# Patient Record
Sex: MALE | Race: WHITE | NOT HISPANIC OR LATINO | Employment: OTHER | URBAN - METROPOLITAN AREA
[De-identification: names, ages, dates, MRNs, and addresses within clinical notes are randomized per-mention and may not be internally consistent; named-entity substitution may affect disease eponyms.]

---

## 2018-03-10 ENCOUNTER — HOSPITAL ENCOUNTER (INPATIENT)
Facility: HOSPITAL | Age: 33
LOS: 5 days | Discharge: HOME | DRG: 439 | End: 2018-03-15
Attending: EMERGENCY MEDICINE | Admitting: FAMILY MEDICINE

## 2018-03-10 DIAGNOSIS — K85.20 ALCOHOL-INDUCED ACUTE PANCREATITIS, UNSPECIFIED COMPLICATION STATUS: Primary | ICD-10-CM

## 2018-03-10 DIAGNOSIS — R00.0 SINUS TACHYCARDIA: ICD-10-CM

## 2018-03-10 DIAGNOSIS — R00.0 TACHYCARDIA: ICD-10-CM

## 2018-03-10 PROBLEM — F10.20 ALCOHOL DEPENDENCE: Status: ACTIVE | Noted: 2018-03-10

## 2018-03-10 PROBLEM — Z72.0 TOBACCO ABUSE: Status: ACTIVE | Noted: 2018-03-10

## 2018-03-10 LAB
ALBUMIN SERPL BCP-MCNC: 4.1 G/DL
ALP SERPL-CCNC: 103 U/L
ALT SERPL W/O P-5'-P-CCNC: 137 U/L
AMPHET+METHAMPHET UR QL: NEGATIVE
AMYLASE SERPL-CCNC: 382 U/L
ANION GAP SERPL CALC-SCNC: 22 MMOL/L
AST SERPL-CCNC: 314 U/L
BACTERIA #/AREA URNS HPF: ABNORMAL /HPF
BACTERIA #/AREA URNS HPF: NORMAL /HPF
BARBITURATES UR QL SCN>200 NG/ML: NEGATIVE
BASOPHILS # BLD AUTO: 0.04 K/UL
BASOPHILS NFR BLD: 0.5 %
BENZODIAZ UR QL SCN>200 NG/ML: NEGATIVE
BILIRUB SERPL-MCNC: 1.2 MG/DL
BILIRUB UR QL STRIP: ABNORMAL
BILIRUB UR QL STRIP: NEGATIVE
BUN SERPL-MCNC: 4 MG/DL
BZE UR QL SCN: NEGATIVE
CALCIUM SERPL-MCNC: 9.1 MG/DL
CANNABINOIDS UR QL SCN: NEGATIVE
CAOX CRY URNS QL MICRO: NORMAL
CHLORIDE SERPL-SCNC: 101 MMOL/L
CLARITY UR: CLEAR
CLARITY UR: CLEAR
CO2 SERPL-SCNC: 18 MMOL/L
COLOR UR: ABNORMAL
COLOR UR: YELLOW
CREAT SERPL-MCNC: 0.7 MG/DL
CREAT UR-MCNC: 161.6 MG/DL
DIFFERENTIAL METHOD: ABNORMAL
EOSINOPHIL # BLD AUTO: 0 K/UL
EOSINOPHIL NFR BLD: 0.1 %
ERYTHROCYTE [DISTWIDTH] IN BLOOD BY AUTOMATED COUNT: 13.5 %
EST. GFR  (AFRICAN AMERICAN): >60 ML/MIN/1.73 M^2
EST. GFR  (NON AFRICAN AMERICAN): >60 ML/MIN/1.73 M^2
ETHANOL SERPL-MCNC: 218 MG/DL
GLUCOSE SERPL-MCNC: 98 MG/DL
GLUCOSE UR QL STRIP: NEGATIVE
GLUCOSE UR QL STRIP: NEGATIVE
HCT VFR BLD AUTO: 42.3 %
HGB BLD-MCNC: 14.5 G/DL
HGB UR QL STRIP: ABNORMAL
HGB UR QL STRIP: NEGATIVE
HYALINE CASTS #/AREA URNS LPF: 0 /LPF
KETONES UR QL STRIP: ABNORMAL
KETONES UR QL STRIP: ABNORMAL
LEUKOCYTE ESTERASE UR QL STRIP: NEGATIVE
LEUKOCYTE ESTERASE UR QL STRIP: NEGATIVE
LIPASE SERPL-CCNC: >1000 U/L
LYMPHOCYTES # BLD AUTO: 0.3 K/UL
LYMPHOCYTES NFR BLD: 4.2 %
MAGNESIUM SERPL-MCNC: 1.5 MG/DL
MCH RBC QN AUTO: 33 PG
MCHC RBC AUTO-ENTMCNC: 34.3 G/DL
MCV RBC AUTO: 96 FL
METHADONE UR QL SCN>300 NG/ML: NEGATIVE
MICROSCOPIC COMMENT: ABNORMAL
MICROSCOPIC COMMENT: NORMAL
MONOCYTES # BLD AUTO: 0.6 K/UL
MONOCYTES NFR BLD: 7.5 %
NEUTROPHILS # BLD AUTO: 6.6 K/UL
NEUTROPHILS NFR BLD: 87.4 %
NITRITE UR QL STRIP: NEGATIVE
NITRITE UR QL STRIP: NEGATIVE
OPIATES UR QL SCN: NORMAL
PCP UR QL SCN>25 NG/ML: NEGATIVE
PH UR STRIP: 5 [PH] (ref 5–8)
PH UR STRIP: 6 [PH] (ref 5–8)
PLATELET # BLD AUTO: 151 K/UL
PMV BLD AUTO: 10 FL
POTASSIUM SERPL-SCNC: 4 MMOL/L
PROT SERPL-MCNC: 7.4 G/DL
PROT UR QL STRIP: ABNORMAL
PROT UR QL STRIP: NEGATIVE
RBC # BLD AUTO: 4.39 M/UL
RBC #/AREA URNS HPF: 0 /HPF (ref 0–4)
RBC #/AREA URNS HPF: 7 /HPF (ref 0–4)
SODIUM SERPL-SCNC: 141 MMOL/L
SP GR UR STRIP: 1.01 (ref 1–1.03)
SP GR UR STRIP: 1.02 (ref 1–1.03)
SQUAMOUS #/AREA URNS HPF: 2 /HPF
SQUAMOUS #/AREA URNS HPF: 2 /HPF
TOXICOLOGY INFORMATION: NORMAL
TROPONIN I SERPL DL<=0.01 NG/ML-MCNC: <0.006 NG/ML
URN SPEC COLLECT METH UR: ABNORMAL
URN SPEC COLLECT METH UR: ABNORMAL
UROBILINOGEN UR STRIP-ACNC: NEGATIVE EU/DL
UROBILINOGEN UR STRIP-ACNC: NEGATIVE EU/DL
WBC # BLD AUTO: 7.56 K/UL
WBC #/AREA URNS HPF: 0 /HPF (ref 0–5)
WBC #/AREA URNS HPF: 0 /HPF (ref 0–5)

## 2018-03-10 PROCEDURE — 82150 ASSAY OF AMYLASE: CPT

## 2018-03-10 PROCEDURE — 85025 COMPLETE CBC W/AUTO DIFF WBC: CPT

## 2018-03-10 PROCEDURE — 25000003 PHARM REV CODE 250: Performed by: FAMILY MEDICINE

## 2018-03-10 PROCEDURE — 84443 ASSAY THYROID STIM HORMONE: CPT

## 2018-03-10 PROCEDURE — 99285 EMERGENCY DEPT VISIT HI MDM: CPT | Mod: 25

## 2018-03-10 PROCEDURE — 25000003 PHARM REV CODE 250: Performed by: EMERGENCY MEDICINE

## 2018-03-10 PROCEDURE — 80320 DRUG SCREEN QUANTALCOHOLS: CPT

## 2018-03-10 PROCEDURE — 25500020 PHARM REV CODE 255: Performed by: EMERGENCY MEDICINE

## 2018-03-10 PROCEDURE — 80053 COMPREHEN METABOLIC PANEL: CPT

## 2018-03-10 PROCEDURE — S0028 INJECTION, FAMOTIDINE, 20 MG: HCPCS | Performed by: EMERGENCY MEDICINE

## 2018-03-10 PROCEDURE — 63600175 PHARM REV CODE 636 W HCPCS: Performed by: FAMILY MEDICINE

## 2018-03-10 PROCEDURE — 36415 COLL VENOUS BLD VENIPUNCTURE: CPT

## 2018-03-10 PROCEDURE — 96361 HYDRATE IV INFUSION ADD-ON: CPT

## 2018-03-10 PROCEDURE — 80074 ACUTE HEPATITIS PANEL: CPT

## 2018-03-10 PROCEDURE — 80307 DRUG TEST PRSMV CHEM ANLYZR: CPT

## 2018-03-10 PROCEDURE — 84484 ASSAY OF TROPONIN QUANT: CPT

## 2018-03-10 PROCEDURE — 83690 ASSAY OF LIPASE: CPT

## 2018-03-10 PROCEDURE — 81000 URINALYSIS NONAUTO W/SCOPE: CPT | Mod: 91

## 2018-03-10 PROCEDURE — 83735 ASSAY OF MAGNESIUM: CPT

## 2018-03-10 PROCEDURE — 11000001 HC ACUTE MED/SURG PRIVATE ROOM

## 2018-03-10 PROCEDURE — 81000 URINALYSIS NONAUTO W/SCOPE: CPT

## 2018-03-10 PROCEDURE — 96374 THER/PROPH/DIAG INJ IV PUSH: CPT

## 2018-03-10 PROCEDURE — 94761 N-INVAS EAR/PLS OXIMETRY MLT: CPT

## 2018-03-10 PROCEDURE — 83036 HEMOGLOBIN GLYCOSYLATED A1C: CPT

## 2018-03-10 PROCEDURE — 86703 HIV-1/HIV-2 1 RESULT ANTBDY: CPT

## 2018-03-10 PROCEDURE — 96375 TX/PRO/DX INJ NEW DRUG ADDON: CPT

## 2018-03-10 PROCEDURE — 63600175 PHARM REV CODE 636 W HCPCS: Performed by: EMERGENCY MEDICINE

## 2018-03-10 RX ORDER — FAMOTIDINE 10 MG/ML
20 INJECTION INTRAVENOUS ONCE
Status: COMPLETED | OUTPATIENT
Start: 2018-03-10 | End: 2018-03-10

## 2018-03-10 RX ORDER — SODIUM CHLORIDE 0.9 % (FLUSH) 0.9 %
5 SYRINGE (ML) INJECTION
Status: DISCONTINUED | OUTPATIENT
Start: 2018-03-10 | End: 2018-03-15 | Stop reason: HOSPADM

## 2018-03-10 RX ORDER — IBUPROFEN 200 MG
1 TABLET ORAL DAILY
Status: DISCONTINUED | OUTPATIENT
Start: 2018-03-11 | End: 2018-03-15 | Stop reason: HOSPADM

## 2018-03-10 RX ORDER — FOLIC ACID 1 MG/1
1 TABLET ORAL DAILY
Status: DISCONTINUED | OUTPATIENT
Start: 2018-03-11 | End: 2018-03-15 | Stop reason: HOSPADM

## 2018-03-10 RX ORDER — MAGNESIUM SULFATE HEPTAHYDRATE 40 MG/ML
2 INJECTION, SOLUTION INTRAVENOUS ONCE
Status: COMPLETED | OUTPATIENT
Start: 2018-03-10 | End: 2018-03-10

## 2018-03-10 RX ORDER — ONDANSETRON 4 MG/1
4 TABLET, ORALLY DISINTEGRATING ORAL EVERY 6 HOURS PRN
Status: DISCONTINUED | OUTPATIENT
Start: 2018-03-10 | End: 2018-03-11

## 2018-03-10 RX ORDER — HYDROMORPHONE HYDROCHLORIDE 2 MG/ML
0.5 INJECTION, SOLUTION INTRAMUSCULAR; INTRAVENOUS; SUBCUTANEOUS
Status: COMPLETED | OUTPATIENT
Start: 2018-03-10 | End: 2018-03-10

## 2018-03-10 RX ORDER — HYDROMORPHONE HYDROCHLORIDE 2 MG/ML
1 INJECTION, SOLUTION INTRAMUSCULAR; INTRAVENOUS; SUBCUTANEOUS EVERY 6 HOURS PRN
Status: DISCONTINUED | OUTPATIENT
Start: 2018-03-10 | End: 2018-03-15 | Stop reason: HOSPADM

## 2018-03-10 RX ORDER — SODIUM CHLORIDE 9 MG/ML
INJECTION, SOLUTION INTRAVENOUS CONTINUOUS
Status: DISCONTINUED | OUTPATIENT
Start: 2018-03-10 | End: 2018-03-13

## 2018-03-10 RX ORDER — ONDANSETRON 2 MG/ML
4 INJECTION INTRAMUSCULAR; INTRAVENOUS
Status: COMPLETED | OUTPATIENT
Start: 2018-03-10 | End: 2018-03-10

## 2018-03-10 RX ORDER — THIAMINE HCL 100 MG
100 TABLET ORAL DAILY
Status: DISCONTINUED | OUTPATIENT
Start: 2018-03-11 | End: 2018-03-15 | Stop reason: HOSPADM

## 2018-03-10 RX ORDER — PANTOPRAZOLE SODIUM 40 MG/10ML
40 INJECTION, POWDER, LYOPHILIZED, FOR SOLUTION INTRAVENOUS DAILY
Status: DISCONTINUED | OUTPATIENT
Start: 2018-03-11 | End: 2018-03-13 | Stop reason: SDUPTHER

## 2018-03-10 RX ADMIN — HYDROMORPHONE HYDROCHLORIDE 0.5 MG: 2 INJECTION, SOLUTION INTRAMUSCULAR; INTRAVENOUS; SUBCUTANEOUS at 02:03

## 2018-03-10 RX ADMIN — IOHEXOL 75 ML: 350 INJECTION, SOLUTION INTRAVENOUS at 06:03

## 2018-03-10 RX ADMIN — FAMOTIDINE 20 MG: 10 INJECTION, SOLUTION INTRAVENOUS at 02:03

## 2018-03-10 RX ADMIN — HYDROMORPHONE HYDROCHLORIDE 1 MG: 2 INJECTION, SOLUTION INTRAMUSCULAR; INTRAVENOUS; SUBCUTANEOUS at 08:03

## 2018-03-10 RX ADMIN — FOLIC ACID: 5 INJECTION, SOLUTION INTRAMUSCULAR; INTRAVENOUS; SUBCUTANEOUS at 10:03

## 2018-03-10 RX ADMIN — ONDANSETRON 4 MG: 2 INJECTION INTRAMUSCULAR; INTRAVENOUS at 02:03

## 2018-03-10 RX ADMIN — MAGNESIUM SULFATE HEPTAHYDRATE 2 G: 40 INJECTION, SOLUTION INTRAVENOUS at 08:03

## 2018-03-10 RX ADMIN — LORAZEPAM 1 MG: 2 INJECTION INTRAMUSCULAR at 11:03

## 2018-03-10 RX ADMIN — SODIUM CHLORIDE 1000 ML: 0.9 INJECTION, SOLUTION INTRAVENOUS at 02:03

## 2018-03-10 NOTE — ED PROVIDER NOTES
Encounter Date: 3/10/2018       History     Chief Complaint   Patient presents with    Abdominal Pain     generalized abdominal pain since 7 this morning with nausea and vomiting       Abdominal Pain   The current episode started several hours ago. The onset of the illness was gradual. The problem has not changed since onset.The abdominal pain is located in the epigastric region. The abdominal pain does not radiate. The severity of the abdominal pain is 10/10. The other symptoms of the illness include vomiting. The other symptoms of the illness do not include fever, shortness of breath, diarrhea or dysuria.   The patient has not had a change in bowel habit. Symptoms associated with the illness do not include back pain.   Patient says he has had same symptoms many times in the past but no diagnosis.  Review of patient's allergies indicates:  No Known Allergies  History reviewed. No pertinent past medical history.  Past Surgical History:   Procedure Laterality Date    APPENDECTOMY       History reviewed. No pertinent family history.  Social History   Substance Use Topics    Smoking status: Current Every Day Smoker    Smokeless tobacco: Not on file    Alcohol use Not on file     Review of Systems   Constitutional: Negative for fever.   Respiratory: Negative for shortness of breath.    Cardiovascular: Negative for chest pain.   Gastrointestinal: Positive for abdominal pain and vomiting. Negative for diarrhea.   Genitourinary: Negative for dysuria.   Musculoskeletal: Negative for back pain.   Skin: Negative for color change.   All other systems reviewed and are negative.      Physical Exam     Initial Vitals [03/10/18 1346]   BP Pulse Resp Temp SpO2   115/70 (!) 112 16 97.7 °F (36.5 °C) --      MAP       85         Physical Exam    Nursing note and vitals reviewed.  Constitutional: He appears distressed.   HENT:   Head: Normocephalic and atraumatic.   Eyes: EOM are normal.   Neck: Normal range of motion. Neck  supple.   Cardiovascular: Normal rate, regular rhythm and normal heart sounds.   Pulmonary/Chest: Breath sounds normal.   Abdominal:   Soft.  Midepigastric tenderness with voluntary guarding.  No rebound.  Bowel sounds normal.   Musculoskeletal: Normal range of motion. He exhibits no edema.   Neurological: He is alert and oriented to person, place, and time.   Skin: Skin is warm and dry.   Psychiatric: His behavior is normal. Thought content normal.         ED Course   Procedures  Labs Reviewed   URINALYSIS - Abnormal; Notable for the following:        Result Value    Color, UA Orange (*)     Protein, UA 1+ (*)     Ketones, UA 1+ (*)     Bilirubin (UA) 1+ (*)     All other components within normal limits   URINALYSIS MICROSCOPIC   TOXICOLOGY SCREEN, URINE, RANDOM (COMPLIANCE)   CBC W/ AUTO DIFFERENTIAL   COMPREHENSIVE METABOLIC PANEL   TROPONIN I   AMYLASE   LIPASE   MAGNESIUM   ALCOHOL,MEDICAL (ETHANOL)             Medical Decision Making:   Clinical Tests:   Lab Tests: Ordered and Reviewed  ED Management:  32-year-old male with severe upper abdominal pain and vomiting due to pancreatitis.  Patient will be admitted to the [facility] family practice team.                      Clinical Impression:   The encounter diagnosis was Alcohol-induced acute pancreatitis, unspecified complication status.                           Can Solis MD  03/10/18 6863

## 2018-03-10 NOTE — HPI
Patient is a 32 year old male with no medical history presenting to the ED after awakening with severe abdominal pain and nausea followed by multiple episodes of emesis.  Patient reports drinking 1 pint of Juan Leyva yesterday.  States he woke up with nausea and pain this morning and when he drank a few sips of water he reports it was followed by multiple episodes of emesis.  He decided to present to the ED as the pain did not subside on its own.  Reports 9/10 abdominal pain with minimal radiation to the back.  Denies trying anything at home for the pain, no aggravating/alleviating factors. He states he is a daily 1/2 to 1 pint Juan Leyva drinker on a daily basis.  Has experienced the shakes and tremors in the past and states he had continued to drink to help alleviate the symptoms.  Has never been hospitalized for this problem.  He denies any illicit drug abuse.  Current 10 cigarettes per day smoker for the past 20 years.  At time of evaluation pain 6/10 (after receiving dose of pain medications).

## 2018-03-10 NOTE — SUBJECTIVE & OBJECTIVE
History reviewed. No pertinent past medical history.    Past Surgical History:   Procedure Laterality Date    APPENDECTOMY         Review of patient's allergies indicates:  No Known Allergies    No current facility-administered medications on file prior to encounter.      No current outpatient prescriptions on file prior to encounter.     Family History     None        Social History Main Topics    Smoking status: Current Every Day Smoker    Smokeless tobacco: Not on file    Alcohol use Not on file    Drug use: Unknown    Sexual activity: Not on file     Review of Systems   Constitutional: Negative for chills and fever.   HENT: Negative for sore throat.    Eyes: Negative for visual disturbance.   Respiratory: Negative for cough, shortness of breath and wheezing.    Cardiovascular: Negative for chest pain, palpitations and leg swelling.   Gastrointestinal: Positive for abdominal pain, nausea and vomiting. Negative for constipation and diarrhea.   Genitourinary: Negative for difficulty urinating.   Musculoskeletal: Negative for arthralgias and myalgias.   Neurological: Negative for dizziness and seizures.   Psychiatric/Behavioral: The patient is not nervous/anxious.      Objective:     Vital Signs (Most Recent):  Temp: 97.7 °F (36.5 °C) (03/10/18 1346)  Pulse: (!) 112 (03/10/18 1346)  Resp: 16 (03/10/18 1346)  BP: 115/70 (03/10/18 1346) Vital Signs (24h Range):  Temp:  [97.7 °F (36.5 °C)] 97.7 °F (36.5 °C)  Pulse:  [112] 112  Resp:  [16] 16  BP: (115)/(70) 115/70     Weight: 58.1 kg (128 lb)  Body mass index is 21.97 kg/m².    Physical Exam   Constitutional: He is oriented to person, place, and time. He appears well-developed and well-nourished.   HENT:   Head: Normocephalic and atraumatic.   Eyes: Conjunctivae are normal.   Neck: Normal range of motion. No JVD present.   Cardiovascular: Normal rate and regular rhythm.    Pulmonary/Chest: Effort normal and breath sounds normal. No respiratory distress. He has no  wheezes. He has no rales.   Abdominal: Soft. Bowel sounds are normal. He exhibits no distension. There is tenderness (epigastric tenderness to palpation).   Musculoskeletal: Normal range of motion.   Neurological: He is alert and oriented to person, place, and time. No cranial nerve deficit.   Skin: Skin is warm. No rash noted.   Psychiatric: He has a normal mood and affect. His behavior is normal. Judgment and thought content normal.           Significant Labs: All labs reviewed. (see paper chart, and pertinent labs below)  AST/ALT: 314/137  Etoh level: 218.1  Lipase: >1000  Amylase: 382  M.5  Trop: WNL  CBC: 7.56/14.5/42.3/151    Significant Imaging: CXR and CT abdomen: pending

## 2018-03-10 NOTE — ED NOTES
32 year old male presents to ed cc of emesis and abdominal pain x 1 day. Patient states he ate at a chinese buffet last night and this morning 0700 began with emesis and ruq/luq abdominal pain.

## 2018-03-10 NOTE — ED NOTES
APPEARANCE: Alert, oriented and in no acute distress.  CARDIAC: Normal rate and rhythm, no murmur heard.   PERIPHERAL VASCULAR: peripheral pulses present. Normal cap refill. No edema. Warm to touch.    RESPIRATORY:Normal rate and effort, breath sounds clear bilaterally throughout chest. Respirations are equal and unlabored no obvious signs of distress.  MUSC: Full ROM. No bony tenderness or soft tissue tenderness. No obvious deformity.  SKIN: Skin is warm and dry, normal skin turgor, mucous membranes moist.  NEURO: 5/5 strength major flexors/extensors bilaterally. Sensory intact to light touch bilaterally. [place] coma scale: eyes open spontaneously-4, oriented & converses-5, obeys commands-6. No neurological abnormalities.   MENTAL STATUS: awake, alert and aware of environment.  EYE: PERRL, both eyes: pupils brisk and reactive to light. Normal size.  ENT: EARS: no obvious drainage. NOSE: no active bleeding.

## 2018-03-11 LAB
ALBUMIN SERPL BCP-MCNC: 3.7 G/DL
ALP SERPL-CCNC: 86 U/L
ALT SERPL W/O P-5'-P-CCNC: 102 U/L
ANION GAP SERPL CALC-SCNC: 10 MMOL/L
AST SERPL-CCNC: 218 U/L
BASOPHILS # BLD AUTO: 0.03 K/UL
BASOPHILS NFR BLD: 0.4 %
BILIRUB SERPL-MCNC: 1.6 MG/DL
BUN SERPL-MCNC: 4 MG/DL
CALCIUM SERPL-MCNC: 8.4 MG/DL
CHLORIDE SERPL-SCNC: 100 MMOL/L
CO2 SERPL-SCNC: 29 MMOL/L
CREAT SERPL-MCNC: 0.7 MG/DL
DIFFERENTIAL METHOD: ABNORMAL
EOSINOPHIL # BLD AUTO: 0 K/UL
EOSINOPHIL NFR BLD: 0.1 %
ERYTHROCYTE [DISTWIDTH] IN BLOOD BY AUTOMATED COUNT: 13.7 %
EST. GFR  (AFRICAN AMERICAN): >60 ML/MIN/1.73 M^2
EST. GFR  (NON AFRICAN AMERICAN): >60 ML/MIN/1.73 M^2
ESTIMATED AVG GLUCOSE: 82 MG/DL
GLUCOSE SERPL-MCNC: 73 MG/DL
HBA1C MFR BLD HPLC: 4.5 %
HCT VFR BLD AUTO: 38 %
HGB BLD-MCNC: 12.8 G/DL
INR PPP: 1.1
LYMPHOCYTES # BLD AUTO: 0.7 K/UL
LYMPHOCYTES NFR BLD: 8.9 %
MAGNESIUM SERPL-MCNC: 1.9 MG/DL
MCH RBC QN AUTO: 32.6 PG
MCHC RBC AUTO-ENTMCNC: 33.7 G/DL
MCV RBC AUTO: 97 FL
MONOCYTES # BLD AUTO: 1 K/UL
MONOCYTES NFR BLD: 12.8 %
NEUTROPHILS # BLD AUTO: 5.9 K/UL
NEUTROPHILS NFR BLD: 77.5 %
PHOSPHATE SERPL-MCNC: 3.5 MG/DL
PLATELET # BLD AUTO: 111 K/UL
PMV BLD AUTO: 10.1 FL
POTASSIUM SERPL-SCNC: 4.2 MMOL/L
PROT SERPL-MCNC: 6.5 G/DL
PROTHROMBIN TIME: 11.7 SEC
RBC # BLD AUTO: 3.93 M/UL
SODIUM SERPL-SCNC: 139 MMOL/L
TSH SERPL DL<=0.005 MIU/L-ACNC: 0.77 UIU/ML
WBC # BLD AUTO: 7.55 K/UL

## 2018-03-11 PROCEDURE — S4991 NICOTINE PATCH NONLEGEND: HCPCS | Performed by: FAMILY MEDICINE

## 2018-03-11 PROCEDURE — 80053 COMPREHEN METABOLIC PANEL: CPT

## 2018-03-11 PROCEDURE — 11000001 HC ACUTE MED/SURG PRIVATE ROOM

## 2018-03-11 PROCEDURE — 84100 ASSAY OF PHOSPHORUS: CPT

## 2018-03-11 PROCEDURE — 25000003 PHARM REV CODE 250: Performed by: FAMILY MEDICINE

## 2018-03-11 PROCEDURE — 63600175 PHARM REV CODE 636 W HCPCS: Performed by: STUDENT IN AN ORGANIZED HEALTH CARE EDUCATION/TRAINING PROGRAM

## 2018-03-11 PROCEDURE — C9113 INJ PANTOPRAZOLE SODIUM, VIA: HCPCS | Performed by: FAMILY MEDICINE

## 2018-03-11 PROCEDURE — 63600175 PHARM REV CODE 636 W HCPCS: Performed by: FAMILY MEDICINE

## 2018-03-11 PROCEDURE — 36415 COLL VENOUS BLD VENIPUNCTURE: CPT

## 2018-03-11 PROCEDURE — 85025 COMPLETE CBC W/AUTO DIFF WBC: CPT

## 2018-03-11 PROCEDURE — 83735 ASSAY OF MAGNESIUM: CPT

## 2018-03-11 PROCEDURE — 85610 PROTHROMBIN TIME: CPT

## 2018-03-11 PROCEDURE — 94761 N-INVAS EAR/PLS OXIMETRY MLT: CPT

## 2018-03-11 PROCEDURE — 25000003 PHARM REV CODE 250: Performed by: STUDENT IN AN ORGANIZED HEALTH CARE EDUCATION/TRAINING PROGRAM

## 2018-03-11 RX ORDER — ONDANSETRON 4 MG/1
4 TABLET, ORALLY DISINTEGRATING ORAL EVERY 6 HOURS
Status: DISCONTINUED | OUTPATIENT
Start: 2018-03-11 | End: 2018-03-15 | Stop reason: HOSPADM

## 2018-03-11 RX ORDER — ONDANSETRON 8 MG/1
8 TABLET, ORALLY DISINTEGRATING ORAL ONCE
Status: COMPLETED | OUTPATIENT
Start: 2018-03-11 | End: 2018-03-11

## 2018-03-11 RX ORDER — CHLORDIAZEPOXIDE HYDROCHLORIDE 25 MG/1
25 CAPSULE, GELATIN COATED ORAL EVERY 8 HOURS
Status: DISCONTINUED | OUTPATIENT
Start: 2018-03-11 | End: 2018-03-11

## 2018-03-11 RX ADMIN — PANTOPRAZOLE SODIUM 40 MG: 40 INJECTION, POWDER, FOR SOLUTION INTRAVENOUS at 09:03

## 2018-03-11 RX ADMIN — ONDANSETRON 4 MG: 4 TABLET, ORALLY DISINTEGRATING ORAL at 06:03

## 2018-03-11 RX ADMIN — LORAZEPAM 1 MG: 2 INJECTION INTRAMUSCULAR; INTRAVENOUS at 10:03

## 2018-03-11 RX ADMIN — HYDROMORPHONE HYDROCHLORIDE 1 MG: 2 INJECTION, SOLUTION INTRAMUSCULAR; INTRAVENOUS; SUBCUTANEOUS at 07:03

## 2018-03-11 RX ADMIN — FOLIC ACID 1 MG: 1 TABLET ORAL at 09:03

## 2018-03-11 RX ADMIN — Medication 100 MG: at 09:03

## 2018-03-11 RX ADMIN — HYDROMORPHONE HYDROCHLORIDE 1 MG: 2 INJECTION, SOLUTION INTRAMUSCULAR; INTRAVENOUS; SUBCUTANEOUS at 02:03

## 2018-03-11 RX ADMIN — SODIUM CHLORIDE: 0.9 INJECTION, SOLUTION INTRAVENOUS at 05:03

## 2018-03-11 RX ADMIN — ONDANSETRON 4 MG: 4 TABLET, ORALLY DISINTEGRATING ORAL at 11:03

## 2018-03-11 RX ADMIN — ONDANSETRON 8 MG: 8 TABLET, ORALLY DISINTEGRATING ORAL at 04:03

## 2018-03-11 RX ADMIN — PROMETHAZINE HYDROCHLORIDE 12.5 MG: 25 INJECTION INTRAMUSCULAR; INTRAVENOUS at 04:03

## 2018-03-11 RX ADMIN — LORAZEPAM 1 MG: 2 INJECTION INTRAMUSCULAR; INTRAVENOUS at 03:03

## 2018-03-11 RX ADMIN — NICOTINE 1 PATCH: 14 PATCH, EXTENDED RELEASE TRANSDERMAL at 09:03

## 2018-03-11 NOTE — PLAN OF CARE
Subjective:      Principal Problem:Alcohol-induced acute pancreatitis     Chief Complaint:        Chief Complaint   Patient presents with    Abdominal Pain       generalized abdominal pain since 7 this morning with nausea and vomiting      Pt independent with ADLs, no HH/HME. Lives with wife and dependent children, Rosi Liz. Family can provide transportation on discharge.       03/11/18 1823   Discharge Assessment   Assessment Type Discharge Planning Assessment   Confirmed/corrected address and phone number on facesheet? Yes   Assessment information obtained from? Patient  (pt and numerous family members in room)   Expected Length of Stay (days) 3   Communicated expected length of stay with patient/caregiver yes   Prior to hospitilization cognitive status: Alert/Oriented   Prior to hospitalization functional status: Independent   Current cognitive status: Alert/Oriented   Current Functional Status: Independent   Facility Arrived From: (home)   Lives With spouse   Able to Return to Prior Arrangements yes   Is patient able to care for self after discharge? Yes   Who are your caregiver(s) and their phone number(s)? wife: Rosi Liz [phone]   Patient's perception of discharge disposition home or selfcare   Readmission Within The Last 30 Days no previous admission in last 30 days   Patient currently being followed by outpatient case management? No   Patient currently receives any other outside agency services? No   Equipment Currently Used at Home none   Do you have any problems affording any of your prescribed medications? TBD   Is the patient taking medications as prescribed? yes   Does the patient have transportation home? Yes   Dialysis Name and Scheduled days N/A   Does the patient receive services at the [facility]? No   Discharge Plan A Home   Discharge Plan B Home with family   Patient/Family In Agreement With Plan yes     Katharina Aguillon RN Transitional Navigator  [phone]

## 2018-03-11 NOTE — NURSING
Notified Dr. Mayes that the pt was having multiple episode of vomiting and stated that he feels very weak. Phenergan and Zofran will be prescribed.

## 2018-03-11 NOTE — H&P
[facility] Hospital Medicine  History & Physical    Patient Name: Humberto Liz  MRN: [id number]  Admission Date: 3/10/2018  Attending Physician: Dr. Rigoberto Ricks  Primary Care Provider: No primary care provider on file.         Patient information was obtained from patient and ER records.     Subjective:     Principal Problem:Alcohol-induced acute pancreatitis    Chief Complaint:   Chief Complaint   Patient presents with    Abdominal Pain     generalized abdominal pain since 7 this morning with nausea and vomiting        HPI: Patient is a 32 year old male with no medical history presenting to the ED after awakening with severe abdominal pain and nausea followed by multiple episodes of emesis.  Patient reports drinking 1 pint of Juan Leyva yesterday.  States he woke up with nausea and pain this morning and when he drank a few sips of water he reports it was followed by multiple episodes of emesis.  He decided to present to the ED as the pain did not subside on its own.  Reports 9/10 abdominal pain with minimal radiation to the back.  Denies trying anything at home for the pain, no aggravating/alleviating factors. He states he is a daily 1/2 to 1 pint Juan Leyva drinker on a daily basis.  Has experienced the shakes and tremors in the past and states he had continued to drink to help alleviate the symptoms.  Has never been hospitalized for this problem.  He denies any illicit drug abuse.  Current 10 cigarettes per day smoker for the past 20 years.  At time of evaluation pain 6/10 (after receiving dose of pain medications).    History reviewed. No pertinent past medical history.    Past Surgical History:   Procedure Laterality Date    APPENDECTOMY         Review of patient's allergies indicates:  No Known Allergies    No current facility-administered medications on file prior to encounter.      No current outpatient prescriptions on file prior to encounter.     Family History     None         Social History Main Topics    Smoking status: Current Every Day Smoker    Smokeless tobacco: Not on file    Alcohol use Not on file    Drug use: Unknown    Sexual activity: Not on file     Review of Systems   Constitutional: Negative for chills and fever.   HENT: Negative for sore throat.    Eyes: Negative for visual disturbance.   Respiratory: Negative for cough, shortness of breath and wheezing.    Cardiovascular: Negative for chest pain, palpitations and leg swelling.   Gastrointestinal: Positive for abdominal pain, nausea and vomiting. Negative for constipation and diarrhea.   Genitourinary: Negative for difficulty urinating.   Musculoskeletal: Negative for arthralgias and myalgias.   Neurological: Negative for dizziness and seizures.   Psychiatric/Behavioral: The patient is not nervous/anxious.      Objective:     Vital Signs (Most Recent):  Temp: 97.7 °F (36.5 °C) (03/10/18 1346)  Pulse: (!) 112 (03/10/18 1346)  Resp: 16 (03/10/18 1346)  BP: 115/70 (03/10/18 1346) Vital Signs (24h Range):  Temp:  [97.7 °F (36.5 °C)] 97.7 °F (36.5 °C)  Pulse:  [112] 112  Resp:  [16] 16  BP: (115)/(70) 115/70     Weight: 58.1 kg (128 lb)  Body mass index is 21.97 kg/m².    Physical Exam   Constitutional: He is oriented to person, place, and time. He appears well-developed and well-nourished.   HENT:   Head: Normocephalic and atraumatic.   Eyes: Conjunctivae are normal.   Neck: Normal range of motion. No JVD present.   Cardiovascular: Normal rate and regular rhythm.    Pulmonary/Chest: Effort normal and breath sounds normal. No respiratory distress. He has no wheezes. He has no rales.   Abdominal: Soft. Bowel sounds are normal. He exhibits no distension. There is tenderness (epigastric tenderness to palpation).   Musculoskeletal: Normal range of motion.   Neurological: He is alert and oriented to person, place, and time. No cranial nerve deficit.   Skin: Skin is warm. No rash noted.   Psychiatric: He has a normal mood  and affect. His behavior is normal. Judgment and thought content normal.           Significant Labs: All labs reviewed. (see paper chart, and pertinent labs below)  AST/ALT: 314/137  Etoh level: 218.1  Lipase: >1000  Amylase: 382  M.5  Trop: WNL  CBC: 7.56/14.5/42.3/151    Significant Imaging: CXR and CT abdomen: pending    Assessment/Plan:     * Alcohol-induced acute pancreatitis    Patient with longstanding history of alcohol abuse  Reported severe abdominal pain associated with nausea/vomiting  Lipase >1000  Amylase: 382  NPO, s/p 1L IVF bolus in ED, will administer banana bag now then continuous IVFs, adequate pain control  CT of abdomen pending        Alcohol dependence    1/2-1 pint of Juan Leyva daily drinker x3 or more years  +history of shakes/tremors in the past resolved with drinking  Etoh level: 218.1  Banana Bag  Thiamine and folate supplementation  Ativan PRN for seizures or tremors, likely will start Librium taper in AM.  CIWA score 0 on admit  Neuro checks, fall precautions/seizure precautions/aspiration precautions.  Patient expresses interest to quit drinking.          Tobacco abuse    10 cigarettes/day x20 years  Counseled on the risks of smoking  Nicotine patch              VTE Risk Mitigation     None             Sandoval Bryant MD  Department of Hospital Medicine   [facility]

## 2018-03-11 NOTE — ASSESSMENT & PLAN NOTE
1/2-1 pint of Juan Leyva daily drinker x3 or more years  +history of shakes/tremors in the past resolved with drinking  Etoh level: 218.1  Banana Bag  Thiamine and folate supplementation  Ativan PRN for seizures or tremors, likely will start Librium taper in AM.  CIWA score 0 on admit  Neuro checks, fall precautions/seizure precautions/aspiration precautions.  Patient expresses interest to quit drinking.

## 2018-03-11 NOTE — NURSING
Notified Dr. Mayes that the pt did not tolerate his clear liquid diet well. Pt complained of nausea and vomited.

## 2018-03-11 NOTE — ASSESSMENT & PLAN NOTE
Patient with longstanding history of alcohol abuse  Reported severe abdominal pain associated with nausea/vomiting  Lipase >1000  Amylase: 382  NPO, s/p 1L IVF bolus in ED, will administer banana bag now then continuous IVFs, adequate pain control  CT of abdomen pending

## 2018-03-11 NOTE — PLAN OF CARE
Problem: Patient Care Overview  Goal: Plan of Care Review  Outcome: Ongoing (interventions implemented as appropriate)  Patient accepting of interventions

## 2018-03-12 PROBLEM — F10.939 ALCOHOL WITHDRAWAL: Status: ACTIVE | Noted: 2018-03-12

## 2018-03-12 LAB
ALBUMIN SERPL BCP-MCNC: 3.4 G/DL
ALP SERPL-CCNC: 80 U/L
ALT SERPL W/O P-5'-P-CCNC: 80 U/L
AMMONIA PLAS-SCNC: 24 UMOL/L
ANION GAP SERPL CALC-SCNC: 11 MMOL/L
AST SERPL-CCNC: 161 U/L
BASOPHILS # BLD AUTO: 0.03 K/UL
BASOPHILS NFR BLD: 0.4 %
BILIRUB SERPL-MCNC: 2.1 MG/DL
BUN SERPL-MCNC: 3 MG/DL
CALCIUM SERPL-MCNC: 8.5 MG/DL
CHLORIDE SERPL-SCNC: 99 MMOL/L
CO2 SERPL-SCNC: 27 MMOL/L
CREAT SERPL-MCNC: 0.7 MG/DL
DIFFERENTIAL METHOD: ABNORMAL
EOSINOPHIL # BLD AUTO: 0.5 K/UL
EOSINOPHIL NFR BLD: 6.9 %
ERYTHROCYTE [DISTWIDTH] IN BLOOD BY AUTOMATED COUNT: 13.4 %
EST. GFR  (AFRICAN AMERICAN): >60 ML/MIN/1.73 M^2
EST. GFR  (NON AFRICAN AMERICAN): >60 ML/MIN/1.73 M^2
FOLATE SERPL-MCNC: 5 NG/ML
GLUCOSE SERPL-MCNC: 92 MG/DL
HAV IGM SERPL QL IA: NEGATIVE
HBV CORE IGM SERPL QL IA: NEGATIVE
HBV SURFACE AG SERPL QL IA: NEGATIVE
HCT VFR BLD AUTO: 37.4 %
HCV AB SERPL QL IA: NEGATIVE
HGB BLD-MCNC: 12.4 G/DL
HIV 1+2 AB+HIV1 P24 AG SERPL QL IA: NEGATIVE
LYMPHOCYTES # BLD AUTO: 0.8 K/UL
LYMPHOCYTES NFR BLD: 10.3 %
MAGNESIUM SERPL-MCNC: 1.6 MG/DL
MCH RBC QN AUTO: 32 PG
MCHC RBC AUTO-ENTMCNC: 33.2 G/DL
MCV RBC AUTO: 97 FL
MONOCYTES # BLD AUTO: 0.9 K/UL
MONOCYTES NFR BLD: 12.3 %
NEUTROPHILS # BLD AUTO: 5.1 K/UL
NEUTROPHILS NFR BLD: 70 %
PHOSPHATE SERPL-MCNC: 1.2 MG/DL
PLATELET # BLD AUTO: 109 K/UL
PMV BLD AUTO: 10.9 FL
POCT GLUCOSE: 75 MG/DL (ref 70–110)
POTASSIUM SERPL-SCNC: 4.1 MMOL/L
PROT SERPL-MCNC: 6.1 G/DL
RBC # BLD AUTO: 3.87 M/UL
RPR SER QL: NORMAL
SODIUM SERPL-SCNC: 137 MMOL/L
VIT B12 SERPL-MCNC: 936 PG/ML
WBC # BLD AUTO: 7.25 K/UL

## 2018-03-12 PROCEDURE — 83735 ASSAY OF MAGNESIUM: CPT

## 2018-03-12 PROCEDURE — 93005 ELECTROCARDIOGRAM TRACING: CPT

## 2018-03-12 PROCEDURE — 63600175 PHARM REV CODE 636 W HCPCS: Performed by: STUDENT IN AN ORGANIZED HEALTH CARE EDUCATION/TRAINING PROGRAM

## 2018-03-12 PROCEDURE — 25000003 PHARM REV CODE 250: Performed by: FAMILY MEDICINE

## 2018-03-12 PROCEDURE — 86592 SYPHILIS TEST NON-TREP QUAL: CPT

## 2018-03-12 PROCEDURE — 82607 VITAMIN B-12: CPT

## 2018-03-12 PROCEDURE — 94761 N-INVAS EAR/PLS OXIMETRY MLT: CPT

## 2018-03-12 PROCEDURE — 25000003 PHARM REV CODE 250

## 2018-03-12 PROCEDURE — 25000003 PHARM REV CODE 250: Performed by: STUDENT IN AN ORGANIZED HEALTH CARE EDUCATION/TRAINING PROGRAM

## 2018-03-12 PROCEDURE — 85025 COMPLETE CBC W/AUTO DIFF WBC: CPT

## 2018-03-12 PROCEDURE — 63600175 PHARM REV CODE 636 W HCPCS: Performed by: FAMILY MEDICINE

## 2018-03-12 PROCEDURE — 20000000 HC ICU ROOM

## 2018-03-12 PROCEDURE — 36415 COLL VENOUS BLD VENIPUNCTURE: CPT

## 2018-03-12 PROCEDURE — 82140 ASSAY OF AMMONIA: CPT

## 2018-03-12 PROCEDURE — 93010 ELECTROCARDIOGRAM REPORT: CPT | Mod: 76,,, | Performed by: INTERNAL MEDICINE

## 2018-03-12 PROCEDURE — 84100 ASSAY OF PHOSPHORUS: CPT

## 2018-03-12 PROCEDURE — 82746 ASSAY OF FOLIC ACID SERUM: CPT

## 2018-03-12 PROCEDURE — S4991 NICOTINE PATCH NONLEGEND: HCPCS | Performed by: FAMILY MEDICINE

## 2018-03-12 PROCEDURE — 80053 COMPREHEN METABOLIC PANEL: CPT

## 2018-03-12 PROCEDURE — C9113 INJ PANTOPRAZOLE SODIUM, VIA: HCPCS | Performed by: FAMILY MEDICINE

## 2018-03-12 PROCEDURE — 93010 ELECTROCARDIOGRAM REPORT: CPT | Mod: ,,, | Performed by: INTERNAL MEDICINE

## 2018-03-12 PROCEDURE — 63600175 PHARM REV CODE 636 W HCPCS

## 2018-03-12 RX ORDER — DIAZEPAM 10 MG/2ML
10 INJECTION INTRAMUSCULAR ONCE
Status: DISCONTINUED | OUTPATIENT
Start: 2018-03-12 | End: 2018-03-12

## 2018-03-12 RX ORDER — DIAZEPAM 10 MG/2ML
5 INJECTION INTRAMUSCULAR EVERY 4 HOURS PRN
Status: DISCONTINUED | OUTPATIENT
Start: 2018-03-12 | End: 2018-03-12

## 2018-03-12 RX ORDER — DIAZEPAM 10 MG/2ML
5 INJECTION INTRAMUSCULAR ONCE
Status: DISCONTINUED | OUTPATIENT
Start: 2018-03-12 | End: 2018-03-12

## 2018-03-12 RX ORDER — SODIUM CHLORIDE 9 MG/ML
INJECTION, SOLUTION INTRAVENOUS CONTINUOUS
Status: DISCONTINUED | OUTPATIENT
Start: 2018-03-12 | End: 2018-03-13

## 2018-03-12 RX ORDER — DEXMEDETOMIDINE HYDROCHLORIDE 4 UG/ML
INJECTION, SOLUTION INTRAVENOUS
Status: DISPENSED
Start: 2018-03-12 | End: 2018-03-13

## 2018-03-12 RX ORDER — DEXMEDETOMIDINE HYDROCHLORIDE 4 UG/ML
0.2 INJECTION, SOLUTION INTRAVENOUS CONTINUOUS
Status: DISCONTINUED | OUTPATIENT
Start: 2018-03-12 | End: 2018-03-14

## 2018-03-12 RX ORDER — FLUMAZENIL 0.1 MG/ML
INJECTION INTRAVENOUS
Status: DISCONTINUED
Start: 2018-03-12 | End: 2018-03-12 | Stop reason: WASHOUT

## 2018-03-12 RX ADMIN — SODIUM CHLORIDE: 0.9 INJECTION, SOLUTION INTRAVENOUS at 01:03

## 2018-03-12 RX ADMIN — DEXMEDETOMIDINE HYDROCHLORIDE 1.4 MCG/KG/HR: 4 INJECTION, SOLUTION INTRAVENOUS at 06:03

## 2018-03-12 RX ADMIN — Medication 100 MG: at 08:03

## 2018-03-12 RX ADMIN — LORAZEPAM 2 MG: 2 INJECTION INTRAMUSCULAR; INTRAVENOUS at 10:03

## 2018-03-12 RX ADMIN — LORAZEPAM 2 MG: 2 INJECTION INTRAMUSCULAR; INTRAVENOUS at 12:03

## 2018-03-12 RX ADMIN — PANTOPRAZOLE SODIUM 40 MG: 40 INJECTION, POWDER, FOR SOLUTION INTRAVENOUS at 08:03

## 2018-03-12 RX ADMIN — LORAZEPAM 1 MG: 2 INJECTION INTRAMUSCULAR; INTRAVENOUS at 05:03

## 2018-03-12 RX ADMIN — SODIUM CHLORIDE: 0.9 INJECTION, SOLUTION INTRAVENOUS at 11:03

## 2018-03-12 RX ADMIN — FOLIC ACID 1 MG: 1 TABLET ORAL at 08:03

## 2018-03-12 RX ADMIN — DEXMEDETOMIDINE HYDROCHLORIDE 1.4 MCG/KG/HR: 4 INJECTION, SOLUTION INTRAVENOUS at 11:03

## 2018-03-12 RX ADMIN — ONDANSETRON 4 MG: 4 TABLET, ORALLY DISINTEGRATING ORAL at 12:03

## 2018-03-12 RX ADMIN — NICOTINE 1 PATCH: 14 PATCH, EXTENDED RELEASE TRANSDERMAL at 08:03

## 2018-03-12 RX ADMIN — ONDANSETRON 4 MG: 4 TABLET, ORALLY DISINTEGRATING ORAL at 03:03

## 2018-03-12 RX ADMIN — LORAZEPAM 2 MG: 2 INJECTION INTRAMUSCULAR; INTRAVENOUS at 08:03

## 2018-03-12 RX ADMIN — LORAZEPAM 2 MG: 2 INJECTION INTRAMUSCULAR; INTRAVENOUS at 11:03

## 2018-03-12 RX ADMIN — LORAZEPAM 1 MG: 2 INJECTION INTRAMUSCULAR at 05:03

## 2018-03-12 RX ADMIN — POTASSIUM PHOSPHATE, MONOBASIC AND POTASSIUM PHOSPHATE, DIBASIC 30 MMOL: 224; 236 INJECTION, SOLUTION, CONCENTRATE INTRAVENOUS at 09:03

## 2018-03-12 NOTE — NURSING
Restraints ordered at 1002. Pt put on restraints for pulling out IV and heart monitor leads. Ativan ordered.

## 2018-03-12 NOTE — PLAN OF CARE
I have remained at bedside. Many doses of ativan given with no relief from symptoms. House sup at bedside. -150.

## 2018-03-12 NOTE — PLAN OF CARE
Rounded on patient. Nurse and charge nurse in room, restraining patient as patient restless-    Case Management to follow for any discharge needs. Lives at home with wife and 3 children.        03/12/18 1306   Discharge Reassessment   Assessment Type Discharge Planning Reassessment   Discharge Plan A Home;Home with family   Can the patient answer the patient profile reliably? No, cognitively impaired

## 2018-03-12 NOTE — PROGRESS NOTES
"Progress Note    Admit Date: 3/10/2018   LOS: 2 days     SUBJECTIVE:     Follow-up For:  Patient agitated and attempting to pull at lines. Patient reports "I feel well. I feel well," but does not answer any other questions. Appears confused.    Scheduled Meds:   folic acid  1 mg Oral Daily    nicotine  1 patch Transdermal Daily    ondansetron  4 mg Oral Q6H    pantoprazole  40 mg Intravenous Daily    potassium phosphate IVPB  30 mmol Intravenous Once    thiamine  100 mg Oral Daily     Continuous Infusions:   sodium chloride 0.9% 100 mL/hr at 03/11/18 0545     PRN Meds:HYDROmorphone, lorazepam, promethazine (PHENERGAN) IVPB, sodium chloride 0.9%    Review of patient's allergies indicates:  No Known Allergies    Review of Systems  Not able to obtain due to acute confusion.    OBJECTIVE:     Vital Signs (Most Recent)  Temp: 96 °F (35.6 °C) (03/12/18 0758)  Pulse: 106 (03/12/18 0758)  Resp: (!) 21 (03/12/18 0758)  BP: 114/82 (03/12/18 0758)  SpO2: 98 % (03/12/18 0758)    Vital Signs Range (Last 24H):  Temp:  [96 °F (35.6 °C)-100.2 °F (37.9 °C)]   Pulse:  []   Resp:  [16-21]   BP: (102-129)/(64-82)   SpO2:  [96 %-98 %]     I & O (Last 24H):  Intake/Output Summary (Last 24 hours) at 03/12/18 1059  Last data filed at 03/12/18 0600   Gross per 24 hour   Intake             2125 ml   Output             1205 ml   Net              920 ml     Physical Exam:  Gen: agitated and pulling at line. On restraints, mildly diaphoretic  HEENT: clear sclera, hearing grossly intact, no nasal drainage  CV: S1S2, tachycardic, no m/g/r  Resp: CTA BL, no acute respiratory distress  Ab: soft, NTND  Ext: no edema    Laboratory:  CBC:   Recent Labs  Lab 03/12/18  0300   WBC 7.25   RBC 3.87*   HGB 12.4*   HCT 37.4*   *   MCV 97   MCH 32.0*   MCHC 33.2     CMP:   Recent Labs  Lab 03/12/18  0300   GLU 92   CALCIUM 8.5*   ALBUMIN 3.4*   PROT 6.1      K 4.1   CO2 27   CL 99   BUN 3*   CREATININE 0.7   ALKPHOS 80   ALT 80* "   *   BILITOT 2.1*     CT abdomen pelvis with contrast  Impression:       1.  Findings concerning for acute pancreatitis, correlation advised.  No focal organized fluid collection noting scattered disorganized abdominopelvic ascites.    2.  Diffuse hepatic hypoattenuation, suggesting steatosis, correlation with LFTs recommended.    3.  Mild prominence of the urinary bladder walls, correlation with urinalysis recommended.    4.  Reactive thickening of the second and third portions of the duodenum, developing pneumonitis, secondary, not excluded.    5.  Several additional findings above.         ASSESSMENT/PLAN:      Alcohol-induced acute pancreatitis     - Patient with longstanding history of alcohol abuse  - Reported severe abdominal pain associated with nausea/vomiting. Now resolved.  - Lipase >1000 Amylase: 382 on admit  - NPO, s/p 1L IVF bolus in ED, will administer banana bag now then continuous I- - - VFs, adequate pain control  - CT of abdomen consistent with acute pancreatitis          Alcohol dependence     - 1/2-1 pint of Juan Leyva daily drinker x3 or more years  +history of shakes/tremors in the past resolved with drinking  - Etoh level: 218.1  - s/p Banana Bag  - Thiamine and folate supplementation  - Ativan q10 min PRN for anxiety  - CIWA score 0 on admit  - Neuro checks, fall precautions/seizure precautions/aspiration precautions.  - Patient expresses interest to quit drinking.  - CIWA > 18 today   - psych consulted          Tobacco abuse     - 10 cigarettes/day x20 years  - Counseled on the risks of smoking  - Nicotine patch     PPx  SCDs

## 2018-03-12 NOTE — PLAN OF CARE
Problem: Pressure Ulcer Risk (Dash Scale) (Adult,Obstetrics,Pediatric)  Goal: Identify Related Risk Factors and Signs and Symptoms  Related risk factors and signs and symptoms are identified upon initiation of Human Response Clinical Practice Guideline (CPG)   Outcome: Ongoing (interventions implemented as appropriate)  Family at bedside. Resident accepting of teaching.

## 2018-03-12 NOTE — PLAN OF CARE
Nursing Transfer Note      3/12/2018     Transfer To room 263 from room 430    Transfer via Bed    Transfer with Sitter, charge nurse and bedside nurse    Medicines sent: No medications    Chart send with patient: Yes    Notified: spouse and brother    Patient reassessed at: by ICU nurse at 1300

## 2018-03-12 NOTE — NURSING
"Notified Dr. Colin of patients intermittent temporary disorientation throughout night shift. VSS. 97% on RA Respirations even and unlabored. Patient able to answer all of questions appropriately this morning but pulled out his IV "accidently" because he "was itching." No new orders given at this time. Monitoring continued  "

## 2018-03-12 NOTE — PT/OT/SLP PROGRESS
Physical Therapy      Patient Name:  Humberto Liz   MRN:  [id number]    Patient transferred from floor to ICU 2/2 extensive DT's not appropriate for PT service . Will follow up 3/13/18    Trever Wiggins, PT

## 2018-03-12 NOTE — PLAN OF CARE
At bedside, observing patient. Patient very agitated. Patient attempting to bite off wrist restraints. Patient still confused, however becoming harder to redirect. Ativan ordered IVP to be given every 10 minutes for anxiety.

## 2018-03-12 NOTE — PLAN OF CARE
Problem: Patient Care Overview  Goal: Plan of Care Review  Outcome: Ongoing (interventions implemented as appropriate)  Reviewed plan of care with pt.  Pt unable to tolerate clear liquid diet today. Pt is now NPO with exceptions of ice. Zofran now scheduled every 6 hours. Safety measures are in place, bed low and in lock position, call light in reach, bed alarm is on, and family remains at the bedside. Pt verbalizes full understanding of their plan of care.

## 2018-03-12 NOTE — NURSING
Pt received on bed in  4pt restraints, awake but disoriented, fighting against restraints, placed in rm 263, cardiac monitor shows sinus tachlycardia, rate 160.

## 2018-03-12 NOTE — PT/OT/SLP PROGRESS
Occupational Therapy  Visit Attempt    Patient Name:  Humberto Liz   MRN:  [id number]    Patient not seen today secondary to t/f to ICU for higher level of care. Will hold OT evaluation this date  . Will follow-up tomorrow's date.    Alisa Weber, OT  3/12/2018

## 2018-03-13 PROBLEM — F10.931 DELIRIUM TREMENS: Status: ACTIVE | Noted: 2018-03-12

## 2018-03-13 LAB
ALBUMIN SERPL BCP-MCNC: 3.4 G/DL
ALP SERPL-CCNC: 80 U/L
ALT SERPL W/O P-5'-P-CCNC: 70 U/L
ANION GAP SERPL CALC-SCNC: 11 MMOL/L
AST SERPL-CCNC: 111 U/L
BASOPHILS # BLD AUTO: 0.02 K/UL
BASOPHILS NFR BLD: 0.4 %
BILIRUB SERPL-MCNC: 1.9 MG/DL
BUN SERPL-MCNC: 6 MG/DL
CALCIUM SERPL-MCNC: 8.7 MG/DL
CHLORIDE SERPL-SCNC: 109 MMOL/L
CO2 SERPL-SCNC: 23 MMOL/L
CREAT SERPL-MCNC: 0.5 MG/DL
DIFFERENTIAL METHOD: ABNORMAL
EOSINOPHIL # BLD AUTO: 0.5 K/UL
EOSINOPHIL NFR BLD: 9.9 %
ERYTHROCYTE [DISTWIDTH] IN BLOOD BY AUTOMATED COUNT: 13.4 %
EST. GFR  (AFRICAN AMERICAN): >60 ML/MIN/1.73 M^2
EST. GFR  (NON AFRICAN AMERICAN): >60 ML/MIN/1.73 M^2
GLUCOSE SERPL-MCNC: 56 MG/DL
HCT VFR BLD AUTO: 41.1 %
HGB BLD-MCNC: 13.8 G/DL
LYMPHOCYTES # BLD AUTO: 0.4 K/UL
LYMPHOCYTES NFR BLD: 8.1 %
MAGNESIUM SERPL-MCNC: 1.7 MG/DL
MCH RBC QN AUTO: 32.4 PG
MCHC RBC AUTO-ENTMCNC: 33.6 G/DL
MCV RBC AUTO: 97 FL
MONOCYTES # BLD AUTO: 0.7 K/UL
MONOCYTES NFR BLD: 13 %
NEUTROPHILS # BLD AUTO: 3.5 K/UL
NEUTROPHILS NFR BLD: 68.4 %
PHOSPHATE SERPL-MCNC: 3 MG/DL
PLATELET # BLD AUTO: 90 K/UL
PMV BLD AUTO: 10.2 FL
POCT GLUCOSE: 78 MG/DL (ref 70–110)
POTASSIUM SERPL-SCNC: 3.1 MMOL/L
PROT SERPL-MCNC: 6.4 G/DL
RBC # BLD AUTO: 4.26 M/UL
SODIUM SERPL-SCNC: 143 MMOL/L
WBC # BLD AUTO: 5.07 K/UL

## 2018-03-13 PROCEDURE — 25000003 PHARM REV CODE 250

## 2018-03-13 PROCEDURE — 94761 N-INVAS EAR/PLS OXIMETRY MLT: CPT

## 2018-03-13 PROCEDURE — 84100 ASSAY OF PHOSPHORUS: CPT

## 2018-03-13 PROCEDURE — 80053 COMPREHEN METABOLIC PANEL: CPT

## 2018-03-13 PROCEDURE — 83735 ASSAY OF MAGNESIUM: CPT

## 2018-03-13 PROCEDURE — 36415 COLL VENOUS BLD VENIPUNCTURE: CPT

## 2018-03-13 PROCEDURE — S4991 NICOTINE PATCH NONLEGEND: HCPCS | Performed by: FAMILY MEDICINE

## 2018-03-13 PROCEDURE — 25000003 PHARM REV CODE 250: Performed by: FAMILY MEDICINE

## 2018-03-13 PROCEDURE — 25000003 PHARM REV CODE 250: Performed by: STUDENT IN AN ORGANIZED HEALTH CARE EDUCATION/TRAINING PROGRAM

## 2018-03-13 PROCEDURE — 97161 PT EVAL LOW COMPLEX 20 MIN: CPT

## 2018-03-13 PROCEDURE — C9113 INJ PANTOPRAZOLE SODIUM, VIA: HCPCS | Performed by: FAMILY MEDICINE

## 2018-03-13 PROCEDURE — 20000000 HC ICU ROOM

## 2018-03-13 PROCEDURE — 85025 COMPLETE CBC W/AUTO DIFF WBC: CPT

## 2018-03-13 PROCEDURE — 97165 OT EVAL LOW COMPLEX 30 MIN: CPT

## 2018-03-13 PROCEDURE — 63600175 PHARM REV CODE 636 W HCPCS: Performed by: FAMILY MEDICINE

## 2018-03-13 PROCEDURE — G8979 MOBILITY GOAL STATUS: HCPCS | Mod: CH

## 2018-03-13 PROCEDURE — G8978 MOBILITY CURRENT STATUS: HCPCS | Mod: CK

## 2018-03-13 RX ORDER — ACETAMINOPHEN 325 MG/1
650 TABLET ORAL ONCE
Status: COMPLETED | OUTPATIENT
Start: 2018-03-13 | End: 2018-03-13

## 2018-03-13 RX ORDER — DEXTROSE MONOHYDRATE, SODIUM CHLORIDE, AND POTASSIUM CHLORIDE 50; 1.49; 4.5 G/1000ML; G/1000ML; G/1000ML
INJECTION, SOLUTION INTRAVENOUS CONTINUOUS
Status: DISPENSED | OUTPATIENT
Start: 2018-03-13 | End: 2018-03-14

## 2018-03-13 RX ADMIN — Medication 100 MG: at 09:03

## 2018-03-13 RX ADMIN — DEXTROSE MONOHYDRATE, SODIUM CHLORIDE, AND POTASSIUM CHLORIDE: 50; 4.5; 1.49 INJECTION, SOLUTION INTRAVENOUS at 02:03

## 2018-03-13 RX ADMIN — DEXMEDETOMIDINE HYDROCHLORIDE 1.4 MCG/KG/HR: 4 INJECTION, SOLUTION INTRAVENOUS at 05:03

## 2018-03-13 RX ADMIN — ONDANSETRON 4 MG: 4 TABLET, ORALLY DISINTEGRATING ORAL at 07:03

## 2018-03-13 RX ADMIN — PANTOPRAZOLE SODIUM 40 MG: 40 INJECTION, POWDER, FOR SOLUTION INTRAVENOUS at 09:03

## 2018-03-13 RX ADMIN — ONDANSETRON 4 MG: 4 TABLET, ORALLY DISINTEGRATING ORAL at 02:03

## 2018-03-13 RX ADMIN — DEXTROSE MONOHYDRATE, SODIUM CHLORIDE, AND POTASSIUM CHLORIDE: 50; 4.5; 1.49 INJECTION, SOLUTION INTRAVENOUS at 07:03

## 2018-03-13 RX ADMIN — ACETAMINOPHEN 650 MG: 325 TABLET ORAL at 05:03

## 2018-03-13 RX ADMIN — FOLIC ACID 1 MG: 1 TABLET ORAL at 09:03

## 2018-03-13 RX ADMIN — DEXTROSE MONOHYDRATE, SODIUM CHLORIDE, AND POTASSIUM CHLORIDE: 50; 4.5; 1.49 INJECTION, SOLUTION INTRAVENOUS at 08:03

## 2018-03-13 RX ADMIN — NICOTINE 1 PATCH: 14 PATCH, EXTENDED RELEASE TRANSDERMAL at 09:03

## 2018-03-13 NOTE — PT/OT/SLP EVAL
Occupational Therapy   Evaluation    Name: Humberto Liz  MRN: [id number]  Admitting Diagnosis:  Delirium tremens      Recommendations:     Discharge Recommendations:  (ongoing)  Discharge Equipment Recommendations:   (TBD)  Barriers to discharge:       History:     Occupational Profile:  Living Environment: Pt reports living with spouse and 3 young children   Previous level of function: independent   Equipment Owned:  none  Assistance upon Discharge: unknown at this time     Past Medical History:   Diagnosis Date    Seizures        Past Surgical History:   Procedure Laterality Date    APPENDECTOMY         Subjective     Chief Complaint: dizziness with standing   Patient/Family stated goals: none stated  Communicated with: nsg prior to session.  Pain/Comfort:  · Pain Rating 1: 0/10    Patients cultural, spiritual, Congregation conflicts given the current situation:      Objective:     Patient found with:      General Precautions: Standard, fall   Orthopedic Precautions:N/A   Braces: N/A     Occupational Performance:    Bed Mobility:    · Patient completed Scooting/Bridging with contact guard assistance  · Patient completed Supine to Sit with contact guard assistance  · Patient completed Sit to Supine with contact guard assistance    Functional Mobility/Transfers:  · Patient completed Sit <> Stand Transfer with minimum assistance  with  no assistive device   · Functional Mobility: unable to perform at this time     Activities of Daily Living:  · LB Dressing: maximal assistance    · Toileting: total assistance page; placement on bed pan     Cognitive/Visual Perceptual:  Cognitive/Psychosocial Skills:     -       Oriented to: Person, Place and Situation   -       Follows Commands/attention:Easily distracted and Follows multistep  commands  -       Communication: clear/fluent  -       Memory: Poor immediate recall  -       Safety awareness/insight to disability: impaired   -       Mood/Affect/Coping skills/emotional  "control: Appropriate to situation    Physical Exam:  Balance:    -       CGA/SBA sitting balance EOB; Min A standing EOB   Postural examination/scapula alignment:    -       Rounded shoulders  Skin integrity: Visible skin intact  Edema:  Mild B hands  Dominant hand:    -       right  Upper Extremity Range of Motion:   BUE appears WFL based on function  Upper Extremity Strength:  BUE grossly 4-/5 based on function   Strength:  Good   Fine Motor Coordination: pt with tremors/shaky throughout session     Patient left supine with all lines intact, call button in reach, bed alarm on and nsg present    AMPA 6 Click:  [facility] Total Score: 18    Treatment & Education:  Limited session at this time 2/2 pt with leaking page over floor and reporting need to have bowel movement; Pt also with reports of extreme dizziness and requiring return to supine after standing   Education:    Assessment:     Humberto Liz is a 32 y.o. male with a medical diagnosis of Delirium tremens.  He presents with deconditioning.  Performance deficits affecting function are weakness, gait instability, impaired balance, impaired endurance, impaired self care skills, impaired functional mobilty, decreased safety awareness, decreased upper extremity function, decreased lower extremity function, impaired cognition.  Pt would benefit from cont OT services in order to maximize functional independence. Recommendations ongoing at this time pending further participation with therapy. Dizzy with minimal axs this date    Rehab Prognosis:  good; patient would benefit from acute skilled OT services to address these deficits and reach maximum level of function.         Clinical Decision Makin.  OT Low:  "Pt evaluation falls under low complexity for evaluation coding due to performance deficits noted in 1-3 areas as stated above and 0 co-morbities affecting current functional status. Data obtained from problem focused assessments. No modifications or " "assistance was required for completion of evaluation. Only brief occupational profile and history review completed."     Plan:     Patient to be seen 5 x/week to address the above listed problems via self-care/home management, therapeutic activities, therapeutic exercises  · Plan of Care Expires: 04/13/18  · Plan of Care Reviewed with: patient    This Plan of care has been discussed with the patient who was involved in its development and understands and is in agreement with the identified goals and treatment plan    GOALS:    Occupational Therapy Goals        Problem: Occupational Therapy Goal    Goal Priority Disciplines Outcome Interventions   Occupational Therapy Goal     OT, PT/OT Ongoing (interventions implemented as appropriate)    Description:  Goals to be met by: 4/13/18     Patient will increase functional independence with ADLs by performing:    LE Dressing with Stand-by Assistance.  Grooming while standing with Stand-by Assistance.  Toileting from toilet with Stand-by Assistance for hygiene and clothing management.   Supine to sit with Stand-by Assistance.  Stand pivot transfers with Stand-by Assistance.  Toilet transfer to toilet with Stand-by Assistance.  Increased functional strength to WFL for self care skills and functional mobility.  Upper extremity exercise program x10 reps per handout, with independence.                      Time Tracking:     OT Date of Treatment: 03/13/18  OT Start Time: 1415  OT Stop Time: 1430  OT Total Time (min): 15 min    Billable Minutes:Evaluation 15    Alisa Weber OT  3/13/2018    "

## 2018-03-13 NOTE — PLAN OF CARE
Problem: Physical Therapy Goal  Goal: Physical Therapy Goal  Goals to be met by: 2018     Patient will increase functional independence with mobility by performin. Supine to sit with Modified [place]  2. Sit to stand transfer with Modified [place]  3. Gait  x >200 feet with Modified [place] using no AD.     Outcome: Ongoing (interventions implemented as appropriate)  Home with no DME needs

## 2018-03-13 NOTE — PT/OT/SLP EVAL
Physical Therapy Evaluation    Patient Name:  Humberto Liz   MRN:  [id number]    Recommendations:     Discharge Recommendations:  home   Discharge Equipment Recommendations: none   Barriers to discharge: None    Assessment:     Humberto Liz is a 32 y.o. male admitted with a medical diagnosis of Delirium tremens.  He presents with the following impairments/functional limitations:  weakness, gait instability, impaired balance, impaired endurance, impaired functional mobilty, impaired self care skills Patient demonstrates tremors at rest. .    Rehab Prognosis:  good; patient would benefit from acute skilled PT services to address these deficits and reach maximum level of function.      Recent Surgery: * No surgery found *      Plan:     During this hospitalization, patient to be seen 5 x/week to address the above listed problems via gait training, therapeutic activities, therapeutic exercises  · Plan of Care Expires:  04/13/18   Plan of Care Reviewed with: patient    Subjective     Communicated with primary nurse prior to session.  Patient found supine upon PT entry to room, agreeable to evaluation.      Chief Complaint: lightheadedness  Patient comments/goals: none voiced  Pain/Comfort:  · Pain Rating 1: 0/10  · Pain Rating Post-Intervention 1: 0/10    Patients cultural, spiritual, Caodaism conflicts given the current situation:      Living Environment:  Lives with spouse  Prior to admission, patients level of function was independent.  Patient has the following equipment: none.  DME owned (not currently used): none.  Upon discharge, patient will have assistance from family.    Objective:     Patient found with: oxygen, page catheter (ICU monitoring )     General Precautions: Standard, fall   Orthopedic Precautions:N/A   Braces: N/A     Exams:  · RLE ROM: WFL  · RLE Strength: WFL  · LLE ROM: WFL  · LLE Strength: WFL    Functional Mobility:  · Bed Mobility:     · Supine to Sit: minimum assistance  · Sit to Supine:  contact guard assistance  · Transfers:     · Sit to Stand:  contact guard assistance with no AD  · Balance: poor +    AM-PAC 6 CLICK MOBILITY  Total Score:19       Therapeutic Activities and Exercises:   na Reported increasing dizziness upon standing with need to return to supine no LOC but needed to have bowel movement    Patient left HOB elevated with all lines intact, call button in reach and primary nurse  present.    GOALS:    Physical Therapy Goals        Problem: Physical Therapy Goal    Goal Priority Disciplines Outcome Goal Variances Interventions   Physical Therapy Goal     PT/OT, PT Ongoing (interventions implemented as appropriate)     Description:  Goals to be met by: 2018     Patient will increase functional independence with mobility by performin. Supine to sit with Modified [place]  2. Sit to stand transfer with Modified [place]  3. Gait  x >200 feet with Modified [place] using no AD.                       History:     Past Medical History:   Diagnosis Date    Seizures        Past Surgical History:   Procedure Laterality Date    APPENDECTOMY         Clinical Decision Making:     History  Co-morbidities and personal factors that may impact the plan of care Examination  Body Structures and Functions, activity limitations and participation restrictions that may impact the plan of care Clinical Presentation   Decision Making/ Complexity Score   Co-morbidities:   [] Time since onset of injury / illness / exacerbation  [] Status of current condition  [x]Patient's cognitive status and safety concerns    [] Multiple Medical Problems (see med hx)  Personal Factors:   [] Patient's age  [] Prior Level of function   [] Patient's home situation (environment and family support)  [x] Patient's level of motivation  [] Expected progression of patient      HISTORY:(criteria)    [] 67993 - no personal factors/history    [x] 87958 - has 1-2 personal factor/comorbidity     [] 17804 - has >3  personal factor/comorbidity     Body Regions:  [] Objective examination findings  [] Head     []  Neck  [] Trunk   [] Upper Extremity  [x] Lower Extremity    Body Systems:  [] For communication ability, affect, cognition, language, and learning style: the assessment of the ability to make needs known, consciousness, orientation (person, place, and time), expected emotional /behavioral responses, and learning preferences (eg, learning barriers, education  needs)  [x] For the neuromuscular system: a general assessment of gross coordinated movement (eg, balance, gait, locomotion, transfers, and transitions) and motor function  (motor control and motor learning)  [x] For the musculoskeletal system: the assessment of gross symmetry, gross range of motion, gross strength, height, and weight  [] For the integumentary system: the assessment of pliability(texture), presence of scar formation, skin color, and skin integrity  [] For cardiovascular/pulmonary system: the assessment of heart rate, respiratory rate, blood pressure, and edema     Activity limitations:    [] Patient's cognitive status and saf ety concerns          [] Status of current condition      [] Weight bearing restriction  [] Cardiopulmunary Restriction    Participation Restrictions:   [] Goals and goal agreement with the patient     [] Rehab potential (prognosis) and probable outcome      Examination of Body System: (criteria)    [] 95323 - addressing 1-2 elements    [x] 22180 - addressing a total of 3 or more elements     [] 17716 -  Addressing a total of 4 or more elements         Clinical Presentation: (criteria)  Stable - 87310     On examination of body system using standardized tests and measures patient presents with 1-2 elements from any of the following: body structures and functions, activity limitations, and/or participation restrictions.  Leading to a clinical presentation that is considered stable and/or  uncomplicated                              Clinical Decision Making  (Eval Complexity):  Low- 35560     Time Tracking:     PT Received On: 03/13/18  PT Start Time: 1415     PT Stop Time: 1430  PT Total Time (min): 15 min     Billable Minutes: Evaluation 15      Trever Wiggins, PT  03/13/2018

## 2018-03-13 NOTE — PLAN OF CARE
Problem: Occupational Therapy Goal  Goal: Occupational Therapy Goal  Goals to be met by: 4/13/18     Patient will increase functional independence with ADLs by performing:    LE Dressing with Stand-by Assistance.  Grooming while standing with Stand-by Assistance.  Toileting from toilet with Stand-by Assistance for hygiene and clothing management.   Supine to sit with Stand-by Assistance.  Stand pivot transfers with Stand-by Assistance.  Toilet transfer to toilet with Stand-by Assistance.  Increased functional strength to WFL for self care skills and functional mobility.  Upper extremity exercise program x10 reps per handout, with independence.    Outcome: Ongoing (interventions implemented as appropriate)  Pt would benefit from cont OT services in order to maximize functional independence. Recommendations ongoing at this time pending further participation with therapy. Dizzy with minimal axs this date

## 2018-03-13 NOTE — PROGRESS NOTES
"Progress Note    Admit Date: 3/10/2018   LOS: 3 days     SUBJECTIVE:     Follow-up For:  Patient calm without signs of agitation this morning. Complaint of "head feels funny."    Scheduled Meds:   folic acid  1 mg Oral Daily    nicotine  1 patch Transdermal Daily    ondansetron  4 mg Oral Q6H    pantoprazole  40 mg Intravenous Daily    thiamine  100 mg Oral Daily     Continuous Infusions:   dexmedetomidine (PRECEDEX) infusion 1.4 mcg/kg/hr (03/13/18 0543)    dextrose 5 % and 0.45 % NaCl with KCl 20 mEq 125 mL/hr at 03/13/18 0718     PRN Meds:HYDROmorphone, lorazepam, promethazine (PHENERGAN) IVPB, sodium chloride 0.9%    Review of patient's allergies indicates:  No Known Allergies    Review of Systems  Constitutional: negative  Gastrointestinal: negative for abdominal pain, nausea and vomiting  Behavioral/Psych: negative for anxiety and irritability. Auditory and visual hallucinations    OBJECTIVE:     Vital Signs (Most Recent)  Temp: (!) 94 °F (34.4 °C) (Bear hugger placed on pt) (03/13/18 0400)  Pulse: 62 (03/13/18 0724)  Resp: 18 (03/13/18 0724)  BP: (!) 139/99 (03/13/18 0600)  SpO2: 98 % (03/13/18 0724)    Vital Signs Range (Last 24H):  Temp:  [94 °F (34.4 °C)-96.8 °F (36 °C)]   Pulse:  []   Resp:  [14-37]   BP: (114-151)/()   SpO2:  [94 %-100 %]     I & O (Last 24H):  Intake/Output Summary (Last 24 hours) at 03/13/18 0749  Last data filed at 03/13/18 0600   Gross per 24 hour   Intake          2384.54 ml   Output              850 ml   Net          1534.54 ml     Physical Exam:  Gen: Well developed well nourished, appears stated age in no distress, drowsy and slurring words  CV: S1S2, bradycardic, no m/g/r  Resp: CTA BL, no acute respiratory distress  Ab: soft, NTND  Ext: no edema. In restraints. No tremors  Psych: Oriented to person. Does not know date/location. Denies Auditory and visual hallucinations. Denies feelings of anxiety.     Laboratory:  CBC:   Recent Labs  Lab 03/13/18  0406   WBC " 5.07   RBC 4.26*   HGB 13.8*   HCT 41.1   PLT 90*   MCV 97   MCH 32.4*   MCHC 33.6     CMP:   Recent Labs  Lab 03/13/18  0406   GLU 56*   CALCIUM 8.7   ALBUMIN 3.4*   PROT 6.4      K 3.1*   CO2 23      BUN 6   CREATININE 0.5   ALKPHOS 80   ALT 70*   *   BILITOT 1.9*       Diagnostic Results:  No imaging in last 24hrs.     ASSESSMENT/PLAN:       Alcohol-induced acute pancreatitis     - Patient with longstanding history of alcohol abuse  - Reported severe abdominal pain associated with nausea/vomiting. Now resolved.  - Lipase >1000 Amylase: 382 on admit  - NPO, s/p 1L IVF bolus in ED, will administer banana bag now then continuous I- -   - VFs, adequate pain control  -CT of abdomen consistent with acute pancreatitis  - currently no appetite     Alcohol dependence     - 1/2-1 pint of [place]yva daily drinker x3 or more years  +history of shakes/tremors in the past resolved with drinking  - Etoh level: 218.1  - s/p Banana Bag  - Thiamine and folate supplementation  - Ativan q10 min PRN for anxiety  - CIWA score 0 on admit  - Neuro checks, fall precautions/seizure precautions/aspiration precautions.  - Patient expresses interest to quit drinking.  - CIWA > 18 on Day 3 admission. Sent to ICU and started on Precedex. This morning patient with a CIWA score of 7-8 (on sedation)  -Possible sedation holiday later today   - psych consulted  - patient wants to quit EtOH     Tobacco abuse     - 10 cigarettes/day x20 years  - Counseled on the risks of smoking  - Nicotine patch     Hypokalemia  - replete on fluids    Hypothermia  - likely 2/2 hypoglycemia  - on bear hugger  - monitor BG  - now on d5 1/2 NS 20 KCl    Transmaminitis  - likely 2/2 EtOH abuse  - AST > ALT  - improving    Thrombocytopenia  - likely 2/2 EtOH abuse  - no bleeding noticed. Continue to monitor    PPx  SCDs

## 2018-03-13 NOTE — PLAN OF CARE
Problem: Restraint, Nonbehavioral (nonviolent)  Goal: Absence of Injury/Harm  Outcome: Ongoing (interventions implemented as appropriate)  Restraint checks q2H, pt free of injury in restraints.

## 2018-03-14 LAB
ALBUMIN SERPL BCP-MCNC: 3.2 G/DL
ALP SERPL-CCNC: 85 U/L
ALT SERPL W/O P-5'-P-CCNC: 62 U/L
ANION GAP SERPL CALC-SCNC: 10 MMOL/L
AST SERPL-CCNC: 95 U/L
BASOPHILS # BLD AUTO: 0.03 K/UL
BASOPHILS NFR BLD: 0.4 %
BILIRUB SERPL-MCNC: 2.2 MG/DL
BUN SERPL-MCNC: 2 MG/DL
CALCIUM SERPL-MCNC: 8.8 MG/DL
CHLORIDE SERPL-SCNC: 102 MMOL/L
CO2 SERPL-SCNC: 23 MMOL/L
CREAT SERPL-MCNC: 0.6 MG/DL
DIFFERENTIAL METHOD: ABNORMAL
EOSINOPHIL # BLD AUTO: 0.3 K/UL
EOSINOPHIL NFR BLD: 4.3 %
ERYTHROCYTE [DISTWIDTH] IN BLOOD BY AUTOMATED COUNT: 13 %
EST. GFR  (AFRICAN AMERICAN): >60 ML/MIN/1.73 M^2
EST. GFR  (NON AFRICAN AMERICAN): >60 ML/MIN/1.73 M^2
GLUCOSE SERPL-MCNC: 134 MG/DL
HCT VFR BLD AUTO: 38.6 %
HGB BLD-MCNC: 13.2 G/DL
LYMPHOCYTES # BLD AUTO: 0.8 K/UL
LYMPHOCYTES NFR BLD: 11.7 %
MAGNESIUM SERPL-MCNC: 1.2 MG/DL
MCH RBC QN AUTO: 32.3 PG
MCHC RBC AUTO-ENTMCNC: 34.2 G/DL
MCV RBC AUTO: 94 FL
MONOCYTES # BLD AUTO: 1.1 K/UL
MONOCYTES NFR BLD: 15.9 %
NEUTROPHILS # BLD AUTO: 4.8 K/UL
NEUTROPHILS NFR BLD: 67.7 %
PHOSPHATE SERPL-MCNC: 1.6 MG/DL
PLATELET # BLD AUTO: 134 K/UL
PMV BLD AUTO: 11.3 FL
POTASSIUM SERPL-SCNC: 4.1 MMOL/L
PROT SERPL-MCNC: 6.2 G/DL
RBC # BLD AUTO: 4.09 M/UL
SODIUM SERPL-SCNC: 135 MMOL/L
WBC # BLD AUTO: 7.03 K/UL

## 2018-03-14 PROCEDURE — 63600175 PHARM REV CODE 636 W HCPCS

## 2018-03-14 PROCEDURE — 36415 COLL VENOUS BLD VENIPUNCTURE: CPT

## 2018-03-14 PROCEDURE — 25000003 PHARM REV CODE 250: Performed by: FAMILY MEDICINE

## 2018-03-14 PROCEDURE — 85025 COMPLETE CBC W/AUTO DIFF WBC: CPT

## 2018-03-14 PROCEDURE — 25000003 PHARM REV CODE 250

## 2018-03-14 PROCEDURE — C9113 INJ PANTOPRAZOLE SODIUM, VIA: HCPCS | Performed by: FAMILY MEDICINE

## 2018-03-14 PROCEDURE — 94761 N-INVAS EAR/PLS OXIMETRY MLT: CPT

## 2018-03-14 PROCEDURE — 97116 GAIT TRAINING THERAPY: CPT

## 2018-03-14 PROCEDURE — 11000001 HC ACUTE MED/SURG PRIVATE ROOM

## 2018-03-14 PROCEDURE — 83735 ASSAY OF MAGNESIUM: CPT

## 2018-03-14 PROCEDURE — 80053 COMPREHEN METABOLIC PANEL: CPT

## 2018-03-14 PROCEDURE — 97535 SELF CARE MNGMENT TRAINING: CPT

## 2018-03-14 PROCEDURE — 25000003 PHARM REV CODE 250: Performed by: STUDENT IN AN ORGANIZED HEALTH CARE EDUCATION/TRAINING PROGRAM

## 2018-03-14 PROCEDURE — S4991 NICOTINE PATCH NONLEGEND: HCPCS | Performed by: FAMILY MEDICINE

## 2018-03-14 PROCEDURE — 63600175 PHARM REV CODE 636 W HCPCS: Performed by: FAMILY MEDICINE

## 2018-03-14 PROCEDURE — 84100 ASSAY OF PHOSPHORUS: CPT

## 2018-03-14 RX ORDER — CHLORDIAZEPOXIDE HYDROCHLORIDE 5 MG/1
10 CAPSULE, GELATIN COATED ORAL 3 TIMES DAILY
Status: DISCONTINUED | OUTPATIENT
Start: 2018-03-17 | End: 2018-03-15

## 2018-03-14 RX ORDER — CHLORDIAZEPOXIDE HYDROCHLORIDE 5 MG/1
5 CAPSULE, GELATIN COATED ORAL ONCE
Status: DISCONTINUED | OUTPATIENT
Start: 2018-03-20 | End: 2018-03-15

## 2018-03-14 RX ORDER — MAGNESIUM SULFATE HEPTAHYDRATE 40 MG/ML
2 INJECTION, SOLUTION INTRAVENOUS ONCE
Status: COMPLETED | OUTPATIENT
Start: 2018-03-14 | End: 2018-03-14

## 2018-03-14 RX ORDER — CHLORDIAZEPOXIDE HYDROCHLORIDE 25 MG/1
25 CAPSULE, GELATIN COATED ORAL 4 TIMES DAILY
Status: DISCONTINUED | OUTPATIENT
Start: 2018-03-14 | End: 2018-03-14

## 2018-03-14 RX ORDER — LANOLIN ALCOHOL/MO/W.PET/CERES
400 CREAM (GRAM) TOPICAL 2 TIMES DAILY
Status: DISCONTINUED | OUTPATIENT
Start: 2018-03-14 | End: 2018-03-15 | Stop reason: HOSPADM

## 2018-03-14 RX ORDER — CHLORDIAZEPOXIDE HYDROCHLORIDE 25 MG/1
50 CAPSULE, GELATIN COATED ORAL 4 TIMES DAILY
Status: COMPLETED | OUTPATIENT
Start: 2018-03-14 | End: 2018-03-14

## 2018-03-14 RX ORDER — CHLORDIAZEPOXIDE HYDROCHLORIDE 5 MG/1
10 CAPSULE, GELATIN COATED ORAL 2 TIMES DAILY
Status: DISCONTINUED | OUTPATIENT
Start: 2018-03-18 | End: 2018-03-15

## 2018-03-14 RX ORDER — CHLORDIAZEPOXIDE HYDROCHLORIDE 5 MG/1
10 CAPSULE, GELATIN COATED ORAL ONCE
Status: DISCONTINUED | OUTPATIENT
Start: 2018-03-19 | End: 2018-03-15

## 2018-03-14 RX ORDER — CHLORDIAZEPOXIDE HYDROCHLORIDE 25 MG/1
25 CAPSULE, GELATIN COATED ORAL 4 TIMES DAILY
Status: DISCONTINUED | OUTPATIENT
Start: 2018-03-15 | End: 2018-03-15

## 2018-03-14 RX ORDER — CHLORDIAZEPOXIDE HYDROCHLORIDE 5 MG/1
10 CAPSULE, GELATIN COATED ORAL 4 TIMES DAILY
Status: DISCONTINUED | OUTPATIENT
Start: 2018-03-16 | End: 2018-03-15

## 2018-03-14 RX ADMIN — CHLORDIAZEPOXIDE HYDROCHLORIDE 50 MG: 25 CAPSULE ORAL at 05:03

## 2018-03-14 RX ADMIN — NICOTINE 1 PATCH: 14 PATCH, EXTENDED RELEASE TRANSDERMAL at 08:03

## 2018-03-14 RX ADMIN — POTASSIUM PHOSPHATE, MONOBASIC AND POTASSIUM PHOSPHATE, DIBASIC 30 MMOL: 224; 236 INJECTION, SOLUTION, CONCENTRATE INTRAVENOUS at 04:03

## 2018-03-14 RX ADMIN — MAGNESIUM SULFATE HEPTAHYDRATE 2 G: 40 INJECTION, SOLUTION INTRAVENOUS at 12:03

## 2018-03-14 RX ADMIN — Medication 100 MG: at 08:03

## 2018-03-14 RX ADMIN — CHLORDIAZEPOXIDE HYDROCHLORIDE 50 MG: 25 CAPSULE ORAL at 12:03

## 2018-03-14 RX ADMIN — POTASSIUM PHOSPHATE, MONOBASIC 1000 MG: 500 TABLET, SOLUBLE ORAL at 08:03

## 2018-03-14 RX ADMIN — CHLORDIAZEPOXIDE HYDROCHLORIDE 50 MG: 25 CAPSULE ORAL at 08:03

## 2018-03-14 RX ADMIN — ONDANSETRON 4 MG: 4 TABLET, ORALLY DISINTEGRATING ORAL at 12:03

## 2018-03-14 RX ADMIN — ONDANSETRON 4 MG: 4 TABLET, ORALLY DISINTEGRATING ORAL at 11:03

## 2018-03-14 RX ADMIN — ONDANSETRON 4 MG: 4 TABLET, ORALLY DISINTEGRATING ORAL at 05:03

## 2018-03-14 RX ADMIN — MAGNESIUM OXIDE TAB 400 MG (241.3 MG ELEMENTAL MG) 400 MG: 400 (241.3 MG) TAB at 08:03

## 2018-03-14 RX ADMIN — POTASSIUM PHOSPHATE, MONOBASIC 1000 MG: 500 TABLET, SOLUBLE ORAL at 05:03

## 2018-03-14 RX ADMIN — ONDANSETRON 4 MG: 4 TABLET, ORALLY DISINTEGRATING ORAL at 01:03

## 2018-03-14 RX ADMIN — DEXTROSE MONOHYDRATE, SODIUM CHLORIDE, AND POTASSIUM CHLORIDE: 50; 4.5; 1.49 INJECTION, SOLUTION INTRAVENOUS at 03:03

## 2018-03-14 RX ADMIN — FOLIC ACID 1 MG: 1 TABLET ORAL at 08:03

## 2018-03-14 RX ADMIN — DEXTROSE 8 MG/HR: 50 INJECTION, SOLUTION INTRAVENOUS at 08:03

## 2018-03-14 NOTE — PROGRESS NOTES
.Pharmacy New Medication Education    Patient accepted medication education.    Pharmacy educated patient on name and purpose of medications and possible side effects, using the teach-back method.     Current Inpatient Medication Orders   chlordiazepoxide capsule 10 mg   chlordiazepoxide capsule 10 mg   chlordiazepoxide capsule 10 mg   chlordiazepoxide capsule 10 mg   chlordiazepoxide capsule 25 mg   chlordiazepoxide capsule 5 mg   chlordiazepoxide capsule 50 mg   folic acid tablet 1 mg   hydromorphone (PF) injection 1 mg   lorazepam (ATIVAN) injection 2 mg   nicotine 14 mg/24 hr 1 patch   ondansetron disintegrating tablet 4 mg   pantoprazole 40 mg in dextrose 5 % 100 mL infusion (ready to mix system)   promethazine (PHENERGAN) 12.5 mg in dextrose 5 % 50 mL IVPB   sodium chloride 0.9% flush 5 mL   thiamine tablet 100 mg       Learners of pharmacy medication education included:  Patient    Patient +/- learner response:  Verbalized Understanding, Teachback

## 2018-03-14 NOTE — NURSING
Note report called on 3/14/18 at 1215 am to Ashvin CASTELLANOS. Pt transferred per wheelchair to  528. No meds were transferred. Nurse Ashvin to  to accept pt. Pt aaox4. No distress noted at transfer.

## 2018-03-14 NOTE — PT/OT/SLP PROGRESS
Physical Therapy Treatment    Patient Name:  Humberto Liz   MRN:  [id number]    Recommendations:     Discharge Recommendations:  home   Discharge Equipment Recommendations: none   Barriers to discharge: None    Assessment:     Humberto Liz is a 32 y.o. male admitted with a medical diagnosis of Delirium tremens.  He presents with the following impairments/functional limitations:  impaired balance Patient with improved functional mobility.    Rehab Prognosis:  Good; patient would benefit from acute skilled PT services to address these deficits and reach maximum level of function.      Recent Surgery: * No surgery found *      Plan:     During this hospitalization, patient to be seen 5 x/week to address the above listed problems via gait training, therapeutic activities, therapeutic exercises  · Plan of Care Expires:  04/13/18   Plan of Care Reviewed with: patient    Subjective     Communicated with primary nurse prior to session.  Patient found supine HOB raised upon PT entry to room, agreeable to treatment.      Chief Complaint: none   Patient comments/goals: go home  Pain/Comfort:  · Pain Rating 1: 0/10  · Pain Rating Post-Intervention 1: 0/10    Patients cultural, spiritual, Restoration conflicts given the current situation:      Objective:     Patient found with: peripheral IV     General Precautions: Standard, fall   Orthopedic Precautions:N/A   Braces: N/A     Functional Mobility:  · Bed Mobility:     · Supine to Sit: modified independence  · Sit to Supine: modified independence  · Transfers:     · Sit to Stand:  modified independence with no AD  · Gait: SBA to supervision no AD ~ 330 ft  · Balance: Good/Fair +      AM-PAC 6 CLICK MOBILITY  Turning over in bed (including adjusting bedclothes, sheets and blankets)?: 4  Sitting down on and standing up from a chair with arms (e.g., wheelchair, bedside commode, etc.): 4  Moving from lying on back to sitting on the side of the bed?: 4  Moving to and from a bed to a  chair (including a wheelchair)?: 4  Need to walk in hospital room?: 3  Climbing 3-5 steps with a railing?: 3  Total Score: 22       Therapeutic Activities and Exercises:   na    Patient left standing with OT with all lines intact..    GOALS:    Physical Therapy Goals        Problem: Physical Therapy Goal    Goal Priority Disciplines Outcome Goal Variances Interventions   Physical Therapy Goal     PT/OT, PT Ongoing (interventions implemented as appropriate)     Description:  Goals to be met by: 2018     Patient will increase functional independence with mobility by performin. Supine to sit with Modified [place]  2. Sit to stand transfer with Modified [place]  3. Gait  x >200 feet with Modified [place] using no AD.                       Time Tracking:     PT Received On: 18  PT Start Time: 944     PT Stop Time: 956  PT Total Time (min): 12 min     Billable Minutes: Gait Training 12    Treatment Type: Treatment  PT/PTA: PT           Trever Wiggins, PT  2018

## 2018-03-14 NOTE — PROGRESS NOTES
TN dicussed case with team , pt transferred to floor from ICU. Psyc consult pending.  PT/OT on case. Pt has no pt/ot needs at this time. TN to follow up.

## 2018-03-14 NOTE — PROGRESS NOTES
Report received from ICU nurse and will review orders and resume care once pt arrives to the unit.

## 2018-03-14 NOTE — PLAN OF CARE
Problem: Patient Care Overview  Goal: Plan of Care Review  Outcome: Ongoing (interventions implemented as appropriate)  Pt is AAOx3. No complaints of nausea, vomiting, or diarrhea. Pt complained of abdominal pain. IV fluids maintained. Clear liquid diet. Tele 8641 and running normal sinus rhythm. Safety precautions maintained. Tolerated all medications well.

## 2018-03-14 NOTE — PROGRESS NOTES
Progress Note    Admit Date: 3/10/2018   LOS: 4 days     SUBJECTIVE:     Follow-up For:  Pancreatitis/delerium tremens. NAEO. Patient denies N/V/abdominal pain. Denies Auditory/visual hallucinations and feelings of anxiety. Patient reports occasional headache relieved on its own. Reports feeling well.    Scheduled Meds:   [START ON 3/16/2018] chlordiazepoxide  10 mg Oral QID    [START ON 3/17/2018] chlordiazepoxide  10 mg Oral TID    [START ON 3/18/2018] chlordiazepoxide  10 mg Oral BID    [START ON 3/19/2018] chlordiazepoxide  10 mg Oral Once    [START ON 3/15/2018] chlordiazepoxide  25 mg Oral QID    [START ON 3/20/2018] chlordiazepoxide  5 mg Oral Once    chlordiazepoxide  50 mg Oral QID    folic acid  1 mg Oral Daily    nicotine  1 patch Transdermal Daily    ondansetron  4 mg Oral Q6H    pantoprazole 40 mg in dextrose 5 % 100 mL infusion (ready to mix system)  40 mg Intravenous Daily    thiamine  100 mg Oral Daily     Continuous Infusions:  PRN Meds:HYDROmorphone, lorazepam, promethazine (PHENERGAN) IVPB, sodium chloride 0.9%    Review of patient's allergies indicates:  No Known Allergies    Review of Systems  As per subjective  Constitutional: Negative for chills, diaphoresis, fatigue, fever and unexpected weight change.   HENT: Negative for congestion, ear pain, postnasal drip, rhinorrhea, sinus pressure, sneezing, sore throat, trouble swallowing and voice change.    Eyes: Negative for pain, discharge, itching and visual disturbance.   Respiratory: Negative for cough, chest tightness, shortness of breath, wheezing and stridor.    Cardiovascular: Negative for chest pain, palpitations and leg swelling.   Gastrointestinal: Negative for abdominal pain, blood in stool, constipation, diarrhea, nausea and vomiting.   Endocrine: Negative for cold intolerance, heat intolerance, polydipsia, polyphagia and polyuria.   Genitourinary: Negative for difficulty urinating, dysuria, flank pain, frequency, hematuria  and urgency.   Musculoskeletal: Negative for arthralgias, back pain, joint swelling and myalgias.   Skin: Negative for pallor, rash and wound.   Neurological: Negative for dizziness, seizures, syncope, weakness, light-headedness and headaches.       OBJECTIVE:     Vital Signs (Most Recent)  Temp: 99.2 °F (37.3 °C) (03/14/18 0552)  Pulse: 89 (03/14/18 0552)  Resp: 18 (03/14/18 0552)  BP: 117/77 (03/14/18 0552)  SpO2: 98 % (03/14/18 0809)    Vital Signs Range (Last 24H):  Temp:  [98.7 °F (37.1 °C)-99.6 °F (37.6 °C)]   Pulse:  []   Resp:  [18-20]   BP: (117-127)/(77-85)   SpO2:  [98 %-99 %]     I & O (Last 24H):  Intake/Output Summary (Last 24 hours) at 03/14/18 0900  Last data filed at 03/14/18 0539   Gross per 24 hour   Intake          2587.46 ml   Output             1700 ml   Net           887.46 ml     Physical Exam:  Gen: Well developed well nourished, appears stated age in no distress.  CV: S1S2, RRR, no m/g/r  Resp: CTA BL, no acute respiratory distress  Ab: soft, tender to palpation in epigastrium. Non-disteneded  Ext: no edema. No tremors  Psych: Oriented to person, place and time. Denies Auditory and visual hallucinations. Denies feelings of anxiety.     Laboratory:  CBC:   Recent Labs  Lab 03/14/18  0605   WBC 7.03   RBC 4.09*   HGB 13.2*   HCT 38.6*   *   MCV 94   MCH 32.3*   MCHC 34.2     CMP:   Recent Labs  Lab 03/14/18  0605   *   CALCIUM 8.8   ALBUMIN 3.2*   PROT 6.2   *   K 4.1   CO2 23      BUN 2*   CREATININE 0.6   ALKPHOS 85   ALT 62*   AST 95*   BILITOT 2.2*       Diagnostic Results:  CT abdomen on 3/10:   1.  Findings concerning for acute pancreatitis, correlation advised.  No focal organized fluid collection noting scattered disorganized abdominopelvic ascites.    2.  Diffuse hepatic hypoattenuation, suggesting steatosis, correlation with LFTs recommended.    3.  Mild prominence of the urinary bladder walls, correlation with urinalysis recommended.    4.  Reactive  thickening of the second and third portions of the duodenum, developing pneumonitis, secondary, not excluded.    5.  Several additional findings above.    ASSESSMENT/PLAN:     Alcohol-induced acute pancreatitis     - Patient with longstanding history of alcohol abuse  - Reported severe abdominal pain associated with nausea/vomiting. Now resolved.  - Lipase >1000 Amylase: 382 on admit  - NPO, s/p 1L IVF bolus in ED, will administer banana bag now then continuous I- -   - VFs, adequate pain control  -CT of abdomen consistent with acute pancreatitis on admit  - currently no appetite will slowly advance diet as tolerated          Alcohol dependence     - 1/2-1 pint of Juan Leyva daily drinker x3 or more years  +history of shakes/tremors in the past resolved with drinking  - Etoh level: 218.1  - s/p Banana Bag  - Thiamine and folate supplementation  - Ativan q10 min PRN for anxiety  - CIWA score 0 on admit  - Neuro checks, fall precautions/seizure precautions/aspiration precautions.  - Patient expresses interest to quit drinking.  - CIWA > 18 on Day 3 admission. Sent to ICU and started on Precedex. This morning patient with a CIWA score of 1 (occasional headache)  -De-escalated from precedex now librium taper   -psych consulted for EtOH cessation          Tobacco abuse     - 10 cigarettes/day x20 years  - Counseled on the risks of smoking  - Nicotine patch      Hypomag  - replete    Hypophos  - replete  - concerns for refeeding     Hypothermia (resolved)  - likely 2/2 hypoglycemia  - monitor BG  - now on d5 1/2 NS 20 KCl  -Temp 97.6-99.6 overnight     Transmaminitis  - likely 2/2 EtOH abuse  - AST > ALT  - improving     Thrombocytopenia  - likely 2/2 EtOH abuse  - no bleeding noticed. Continue to monitor  -Platelet count increased from yesterday     PPx  SCDs      Case discussed with [facility] attending    Shreyas Norris MD  [employer] [facility] PGY3

## 2018-03-14 NOTE — PLAN OF CARE
Problem: Occupational Therapy Goal  Goal: Occupational Therapy Goal  Goals to be met by: 4/13/18     Patient will increase functional independence with ADLs by performing:    LE Dressing with Stand-by Assistance.  Grooming while standing with Stand-by Assistance.  Toileting from toilet with Stand-by Assistance for hygiene and clothing management.   Supine to sit with Stand-by Assistance.  Stand pivot transfers with Stand-by Assistance.  Toilet transfer to toilet with Stand-by Assistance.  Increased functional strength to [facility] for self care skills and functional mobility.  Upper extremity exercise program x10 reps per handout, with independence.     Outcome: Outcome(s) achieved Date Met: 03/14/18  Pt has met all OT goals this date. No further OT services required at this time. Educated on home safety. D/c OT at this time

## 2018-03-14 NOTE — PLAN OF CARE
Problem: Physical Therapy Goal  Goal: Physical Therapy Goal  Goals to be met by: 2018     Patient will increase functional independence with mobility by performin. Supine to sit with Modified [place]  2. Sit to stand transfer with Modified [place]  3. Gait  x >200 feet with Modified [place] using no AD.      Outcome: Ongoing (interventions implemented as appropriate)  Functional mobility much improved 330 ft with AD supervision only  Gait steady  Home no DME needs

## 2018-03-14 NOTE — PT/OT/SLP PROGRESS
Occupational Therapy   Treatment    Name: Humberto Liz  MRN: [id number]  Admitting Diagnosis:  Delirium tremens       Recommendations:     Discharge Recommendations: home  Discharge Equipment Recommendations:  shower chair  Barriers to discharge:  None    Subjective     Communicated with: nsg prior to session.  Pain/Comfort:  · Pain Rating 1: 0/10    Patients cultural, spiritual, [employer] conflicts given the current situation:      Objective:     Patient found with:      General Precautions: Standard, fall   Orthopedic Precautions:N/A   Braces: N/A     Occupational Performance:    Bed Mobility:    · Patient completed Scooting/Bridging with supervision  · Patient completed Supine to Sit with supervision  · Patient completed Sit to Supine with supervision     Functional Mobility/Transfers:  · Patient completed Sit <> Stand Transfer with supervision  with  no assistive device   · Patient completed Toilet Transfer Step Transfer technique with supervision with  no AD  · Functional Mobility: supervision without AD; ~500 feet     Activities of Daily Living:  · Grooming: supervision    · UB Dressing: supervision    · LB Dressing: supervision    · Toileting: supervision      Patient left supine with all lines intact, call button in reach, bed alarm on and nsg notified    [facility] 6 Click:  [facility] Total Score: 23    Treatment & Education:  Pt performing skills as above; oriented this date, no confusion noted, did not report dizziness during axs. Performed functional mobility without AD in order to participate in ADLs in standing  Education:    Assessment:     Humberto Liz is a 32 y.o. male with a medical diagnosis of Delirium tremens.  .  Performance deficits affecting function are impaired functional mobilty.  Pt has met all OT goals this date. No further OT services required at this time. Educated on home safety. D/c OT at this time     Rehab Prognosis:  good; patient would benefit from acute skilled OT services to address  these deficits and reach maximum level of function.       Plan:     Patient to be seen  (d/c OT ) to address the above listed problems via self-care/home management, therapeutic activities, therapeutic exercises  · Plan of Care Expires: 03/14/18  · Plan of Care Reviewed with: patient    This Plan of care has been discussed with the patient who was involved in its development and understands and is in agreement with the identified goals and treatment plan    GOALS:    Occupational Therapy Goals     Not on file          Multidisciplinary Problems (Resolved)        Problem: Occupational Therapy Goal    Goal Priority Disciplines Outcome Interventions   Occupational Therapy Goal   (Resolved)     OT, PT/OT Outcome(s) achieved    Description:  Goals to be met by: 4/13/18     Patient will increase functional independence with ADLs by performing:    LE Dressing with Stand-by Assistance.  Grooming while standing with Stand-by Assistance.  Toileting from toilet with Stand-by Assistance for hygiene and clothing management.   Supine to sit with Stand-by Assistance.  Stand pivot transfers with Stand-by Assistance.  Toilet transfer to toilet with Stand-by Assistance.  Increased functional strength to WFL for self care skills and functional mobility.  Upper extremity exercise program x10 reps per handout, with independence.                      Time Tracking:     OT Date of Treatment: 03/14/18  OT Start Time: 0943  OT Stop Time: 1006  OT Total Time (min): 23 min    Billable Minutes:Self Care/Home Management 10 co treatment with PT     Alisa Weber, OT  3/14/2018

## 2018-03-15 VITALS
TEMPERATURE: 97 F | WEIGHT: 110.25 LBS | HEIGHT: 64 IN | BODY MASS INDEX: 18.82 KG/M2 | SYSTOLIC BLOOD PRESSURE: 107 MMHG | RESPIRATION RATE: 18 BRPM | HEART RATE: 80 BPM | DIASTOLIC BLOOD PRESSURE: 76 MMHG | OXYGEN SATURATION: 99 %

## 2018-03-15 PROBLEM — F10.931 DELIRIUM TREMENS: Status: ACTIVE | Noted: 2018-03-15

## 2018-03-15 PROBLEM — K85.20 ALCOHOL-INDUCED ACUTE PANCREATITIS: Status: ACTIVE | Noted: 2018-03-15

## 2018-03-15 LAB
ALBUMIN SERPL BCP-MCNC: 3.4 G/DL
ALP SERPL-CCNC: 90 U/L
ALT SERPL W/O P-5'-P-CCNC: 58 U/L
ANION GAP SERPL CALC-SCNC: 11 MMOL/L
AST SERPL-CCNC: 80 U/L
BASOPHILS # BLD AUTO: 0.06 K/UL
BASOPHILS NFR BLD: 0.9 %
BILIRUB SERPL-MCNC: 1.8 MG/DL
BUN SERPL-MCNC: 3 MG/DL
CALCIUM SERPL-MCNC: 9.1 MG/DL
CHLORIDE SERPL-SCNC: 104 MMOL/L
CO2 SERPL-SCNC: 25 MMOL/L
CREAT SERPL-MCNC: 0.7 MG/DL
DIFFERENTIAL METHOD: ABNORMAL
EOSINOPHIL # BLD AUTO: 0.5 K/UL
EOSINOPHIL NFR BLD: 7.6 %
ERYTHROCYTE [DISTWIDTH] IN BLOOD BY AUTOMATED COUNT: 13.3 %
EST. GFR  (AFRICAN AMERICAN): >60 ML/MIN/1.73 M^2
EST. GFR  (NON AFRICAN AMERICAN): >60 ML/MIN/1.73 M^2
GLUCOSE SERPL-MCNC: 114 MG/DL
HCT VFR BLD AUTO: 38.9 %
HGB BLD-MCNC: 13.4 G/DL
LYMPHOCYTES # BLD AUTO: 1.2 K/UL
LYMPHOCYTES NFR BLD: 19.5 %
MAGNESIUM SERPL-MCNC: 1.8 MG/DL
MCH RBC QN AUTO: 32.7 PG
MCHC RBC AUTO-ENTMCNC: 34.4 G/DL
MCV RBC AUTO: 95 FL
MONOCYTES # BLD AUTO: 1.1 K/UL
MONOCYTES NFR BLD: 17.3 %
NEUTROPHILS # BLD AUTO: 3.5 K/UL
NEUTROPHILS NFR BLD: 54.5 %
PHOSPHATE SERPL-MCNC: 4.6 MG/DL
PLATELET # BLD AUTO: 173 K/UL
PMV BLD AUTO: 10.5 FL
POTASSIUM SERPL-SCNC: 3 MMOL/L
PROT SERPL-MCNC: 6.5 G/DL
RBC # BLD AUTO: 4.1 M/UL
SODIUM SERPL-SCNC: 140 MMOL/L
WBC # BLD AUTO: 6.35 K/UL

## 2018-03-15 PROCEDURE — 97530 THERAPEUTIC ACTIVITIES: CPT

## 2018-03-15 PROCEDURE — C9113 INJ PANTOPRAZOLE SODIUM, VIA: HCPCS | Performed by: FAMILY MEDICINE

## 2018-03-15 PROCEDURE — 36415 COLL VENOUS BLD VENIPUNCTURE: CPT

## 2018-03-15 PROCEDURE — 25000003 PHARM REV CODE 250: Performed by: FAMILY MEDICINE

## 2018-03-15 PROCEDURE — 63600175 PHARM REV CODE 636 W HCPCS: Performed by: FAMILY MEDICINE

## 2018-03-15 PROCEDURE — 94761 N-INVAS EAR/PLS OXIMETRY MLT: CPT

## 2018-03-15 PROCEDURE — 83735 ASSAY OF MAGNESIUM: CPT

## 2018-03-15 PROCEDURE — S4991 NICOTINE PATCH NONLEGEND: HCPCS | Performed by: FAMILY MEDICINE

## 2018-03-15 PROCEDURE — 80053 COMPREHEN METABOLIC PANEL: CPT

## 2018-03-15 PROCEDURE — 84100 ASSAY OF PHOSPHORUS: CPT

## 2018-03-15 PROCEDURE — 85025 COMPLETE CBC W/AUTO DIFF WBC: CPT

## 2018-03-15 PROCEDURE — 25000003 PHARM REV CODE 250: Performed by: STUDENT IN AN ORGANIZED HEALTH CARE EDUCATION/TRAINING PROGRAM

## 2018-03-15 PROCEDURE — 25000003 PHARM REV CODE 250

## 2018-03-15 PROCEDURE — 97116 GAIT TRAINING THERAPY: CPT

## 2018-03-15 PROCEDURE — 63600175 PHARM REV CODE 636 W HCPCS

## 2018-03-15 RX ORDER — LANOLIN ALCOHOL/MO/W.PET/CERES
100 CREAM (GRAM) TOPICAL DAILY
Qty: 30 TABLET | Refills: 11 | Status: SHIPPED | OUTPATIENT
Start: 2018-03-16 | End: 2018-03-27

## 2018-03-15 RX ORDER — POTASSIUM CHLORIDE 7.45 MG/ML
10 INJECTION INTRAVENOUS ONCE
Status: COMPLETED | OUTPATIENT
Start: 2018-03-15 | End: 2018-03-15

## 2018-03-15 RX ORDER — POTASSIUM CHLORIDE 20 MEQ/1
40 TABLET, EXTENDED RELEASE ORAL 3 TIMES DAILY
Status: DISCONTINUED | OUTPATIENT
Start: 2018-03-15 | End: 2018-03-15 | Stop reason: HOSPADM

## 2018-03-15 RX ORDER — FOLIC ACID 1 MG/1
1 TABLET ORAL DAILY
Qty: 30 TABLET | Refills: 11 | Status: SHIPPED | OUTPATIENT
Start: 2018-03-16 | End: 2018-03-27

## 2018-03-15 RX ORDER — CHLORDIAZEPOXIDE HYDROCHLORIDE 5 MG/1
10 CAPSULE, GELATIN COATED ORAL 2 TIMES DAILY
Status: DISCONTINUED | OUTPATIENT
Start: 2018-03-15 | End: 2018-03-15

## 2018-03-15 RX ORDER — CHLORDIAZEPOXIDE HYDROCHLORIDE 5 MG/1
10 CAPSULE, GELATIN COATED ORAL ONCE
Status: DISCONTINUED | OUTPATIENT
Start: 2018-03-16 | End: 2018-03-15 | Stop reason: HOSPADM

## 2018-03-15 RX ORDER — CHLORDIAZEPOXIDE HYDROCHLORIDE 5 MG/1
10 CAPSULE, GELATIN COATED ORAL 2 TIMES DAILY
Status: DISCONTINUED | OUTPATIENT
Start: 2018-03-15 | End: 2018-03-15 | Stop reason: HOSPADM

## 2018-03-15 RX ORDER — POTASSIUM CHLORIDE 20 MEQ/1
40 TABLET, EXTENDED RELEASE ORAL 2 TIMES DAILY
Qty: 56 TABLET | Refills: 0 | Status: SHIPPED | OUTPATIENT
Start: 2018-03-15 | End: 2018-03-27

## 2018-03-15 RX ORDER — CHLORDIAZEPOXIDE HYDROCHLORIDE 10 MG/1
10 CAPSULE, GELATIN COATED ORAL DAILY
Qty: 2 CAPSULE | Refills: 0 | Status: SHIPPED | OUTPATIENT
Start: 2018-03-15 | End: 2018-03-27

## 2018-03-15 RX ADMIN — FOLIC ACID 1 MG: 1 TABLET ORAL at 09:03

## 2018-03-15 RX ADMIN — MAGNESIUM OXIDE TAB 400 MG (241.3 MG ELEMENTAL MG) 400 MG: 400 (241.3 MG) TAB at 09:03

## 2018-03-15 RX ADMIN — DEXTROSE 40 MG: 50 INJECTION, SOLUTION INTRAVENOUS at 09:03

## 2018-03-15 RX ADMIN — POTASSIUM CHLORIDE 40 MEQ: 20 TABLET, EXTENDED RELEASE ORAL at 12:03

## 2018-03-15 RX ADMIN — POTASSIUM PHOSPHATE, MONOBASIC 1000 MG: 500 TABLET, SOLUBLE ORAL at 09:03

## 2018-03-15 RX ADMIN — CHLORDIAZEPOXIDE HYDROCHLORIDE 25 MG: 25 CAPSULE ORAL at 09:03

## 2018-03-15 RX ADMIN — NICOTINE 1 PATCH: 14 PATCH, EXTENDED RELEASE TRANSDERMAL at 09:03

## 2018-03-15 RX ADMIN — POTASSIUM CHLORIDE 10 MEQ: 10 INJECTION, SOLUTION INTRAVENOUS at 12:03

## 2018-03-15 RX ADMIN — ONDANSETRON 4 MG: 4 TABLET, ORALLY DISINTEGRATING ORAL at 05:03

## 2018-03-15 RX ADMIN — Medication 100 MG: at 09:03

## 2018-03-15 RX ADMIN — ONDANSETRON 4 MG: 4 TABLET, ORALLY DISINTEGRATING ORAL at 12:03

## 2018-03-15 NOTE — PROGRESS NOTES
.Pharmacy New Medication Education    Patient accepted medication education.    Pharmacy educated patient on name and purpose of medications and possible side effects, using the teach-back method.     Potassium phosphate     Learners of pharmacy medication education included:  Patient    Patient +/- learner response:  Verbalized Understanding, Teachback

## 2018-03-15 NOTE — PLAN OF CARE
Patient to d/c home, pt has no HH or DME needs.  Pt provided with  AA resources and contact numbers to obtain meeting times.     The following has been explained to patient in detail. TN attempted to use language line, pt declined , states he speak and understands english.   Follow-up With  Details  Why  Contact Info   Delgado Fam PA-C  On 3/27/2018  @9:40am. Hospital follow up  [address]  [address]  [facility] [address]  [phone]   [facility]  Go to  for Mental health services follow up- (walk-in clinic for eval and medication assisstance between 9am-2pm Monday- thursday)  [address]  [address]  [place] [address]  [phone]          Future Appointments  Date Time Provider Department Center   3/27/2018 9:40 AM Delgado Fam PA-C [facility]           03/15/18 1533   Final Note   Assessment Type Final Discharge Note   Discharge Disposition Home   What phone number can be called within the next 1-3 days to see how you are doing after discharge? [phone]   Hospital Follow Up  Appt(s) scheduled? Yes   Discharge plans and expectations educations in teach back method with documentation complete? Yes   Right Care Referral Info   Post Acute Recommendation No Care

## 2018-03-15 NOTE — PLAN OF CARE
Problem: Patient Care Overview  Goal: Plan of Care Review  Outcome: Ongoing (interventions implemented as appropriate)  Received pt on RA; no distress noted

## 2018-03-15 NOTE — PLAN OF CARE
Problem: Patient Care Overview  Goal: Plan of Care Review  Outcome: Ongoing (interventions implemented as appropriate)  Pt is AAOx3. No complaints of nausea or vomiting. Pt having diarrhea. No complaints of pain. Ambulated with assist. Vance diet and tolerating it well. Tele [id number] and running sinus rhythm. Safety precautions maintained. Tolerated all medications well.

## 2018-03-15 NOTE — PROGRESS NOTES
Progress Note    Admit Date: 3/10/2018   LOS: 5 days     SUBJECTIVE:     Follow-up For:  Pancreatitis/delerium tremens. Patient reports eating noodles last night. No N/V/abd pain, denies feelings of anxiety, A/V/T Hallucinations. Patient reports 4 episodes of non-bloody diarrhea, and occasional headaches relieved on own.  Patient still interested in stopping drinking. Patient states he feels well    Scheduled Meds:   [START ON 3/16/2018] chlordiazepoxide  10 mg Oral QID    [START ON 3/17/2018] chlordiazepoxide  10 mg Oral TID    [START ON 3/18/2018] chlordiazepoxide  10 mg Oral BID    [START ON 3/19/2018] chlordiazepoxide  10 mg Oral Once    chlordiazepoxide  25 mg Oral QID    [START ON 3/20/2018] chlordiazepoxide  5 mg Oral Once    folic acid  1 mg Oral Daily    magnesium oxide  400 mg Oral BID    nicotine  1 patch Transdermal Daily    ondansetron  4 mg Oral Q6H    pantoprazole 40 mg in dextrose 5 % 100 mL infusion (ready to mix system)  40 mg Intravenous Daily    potassium phosphate (monobasic)  1,000 mg Oral TID    thiamine  100 mg Oral Daily     Continuous Infusions:  PRN Meds:HYDROmorphone, lorazepam, promethazine (PHENERGAN) IVPB, sodium chloride 0.9%    Review of patient's allergies indicates:  No Known Allergies     Review of Systems  As per subjective  Constitutional: Negative for chills, diaphoresis, fatigue, fever and unexpected weight change.   HENT: Negative for congestion, ear pain, postnasal drip, rhinorrhea, sinus pressure, sneezing, sore throat, trouble swallowing and voice change.    Eyes: Negative for pain, discharge, itching and visual disturbance.   Respiratory: Negative for cough, chest tightness, shortness of breath, wheezing and stridor.    Cardiovascular: Negative for chest pain, palpitations and leg swelling.   Gastrointestinal: Negative for abdominal pain, blood in stool, constipation, nausea and vomiting.   Endocrine: Negative for cold intolerance, heat intolerance,  polydipsia, polyphagia and polyuria.   Genitourinary: Negative for difficulty urinating, dysuria, flank pain, frequency, hematuria and urgency.   Musculoskeletal: Negative for arthralgias, back pain, joint swelling and myalgias.   Skin: Negative for pallor, rash and wound.   Neurological: Negative for dizziness, seizures, syncope, weakness, light-headedness.         OBJECTIVE:     Vital Signs (Most Recent)  Temp: 97.7 °F (36.5 °C) (03/15/18 0448)  Pulse: 77 (03/15/18 0800)  Resp: 19 (03/15/18 0448)  BP: 103/67 (03/15/18 0448)  SpO2: 99 % (03/15/18 0447)    Vital Signs Range (Last 24H):  Temp:  [97.7 °F (36.5 °C)-99 °F (37.2 °C)]   Pulse:  []   Resp:  [18-19]   BP: (103-127)/(67-85)   SpO2:  [98 %-100 %]     I & O (Last 24H):  Intake/Output Summary (Last 24 hours) at 03/15/18 0902  Last data filed at 03/14/18 2320   Gross per 24 hour   Intake              640 ml   Output             1400 ml   Net             -760 ml     Physical Exam:  Physical Exam:  Gen: Well developed well nourished, appears stated age in no distress.  CV: S1S2, RRR, no m/g/r  Resp: CTA BL, no acute respiratory distress  Ab: soft, tender to palpation in epigastrium. Non-disteneded  Ext: no edema. No tremors  Psych: Oriented to person, place and time. Denies Auditory and visual hallucinations. Denies feelings of anxiety    Laboratory:  CBC:   Recent Labs  Lab 03/15/18  0546   WBC 6.35   RBC 4.10*   HGB 13.4*   HCT 38.9*      MCV 95   MCH 32.7*   MCHC 34.4     CMP:   Recent Labs  Lab 03/15/18  0546   *   CALCIUM 9.1   ALBUMIN 3.4*   PROT 6.5      K 3.0*   CO2 25      BUN 3*   CREATININE 0.7   ALKPHOS 90   ALT 58*   AST 80*   BILITOT 1.8*     LFTs:   Recent Labs  Lab 03/15/18  0546   ALT 58*   AST 80*   ALKPHOS 90   BILITOT 1.8*   PROT 6.5   ALBUMIN 3.4*       Diagnostic Results:  no imaging in last 24hrs    ASSESSMENT/PLAN:     Alcohol Induced acute pancreatitis  - Patient with longstanding history of alcohol abuse  -  Reported severe abdominal pain associated with nausea/vomiting. Now resolved.  - Lipase >1000 Amylase: 382 on admit  - NPO, s/p 1L IVF bolus in ED, will administer banana bag now then continuous I- -   - VFs, adequate pain control  -CT of abdomen consistent with acute pancreatitis on admit  - Patient reports eating noodles for his last meal            Alcohol dependence     - 1/2-1 pint of Juan Leyva daily drinker x3 or more years  +history of shakes/tremors in the past resolved with drinking  - Etoh level: 218.1 on admit  - s/p Banana Bag  - Thiamine and folate supplementation  - Ativan q10 min PRN for anxiety  - CIWA score 0 on admit  - Neuro checks, fall precautions/seizure precautions/aspiration precautions.  - Patient expresses interest to quit drinking.  - CIWA > 18 on Day 3 admission. Sent to ICU and started on Precedex. This morning patient with a CIWA score of 1 (occasional headache)  -De-escalated from precedex now librium taper   -psych consulted for EtOH cessation            Tobacco abuse     - 10 cigarettes/day x20 years  - Counseled on the risks of smoking  - Nicotine patch      Hypokalemia  -Likely secondary to diarrhea yesterday  Will replete with KCl     Transmaminitis  - likely 2/2 EtOH abuse  - AST > ALT  - improving     Thrombocytopenia  - likely 2/2 EtOH abuse  - no bleeding noticed. Continue to monitor  -Platelet count increased from yesterday     PPx  SCDs        Case discussed with [facility] attending    Shreyas Norris MD  [employer] [facility] PGY3

## 2018-03-15 NOTE — PLAN OF CARE
Problem: Physical Therapy Goal  Goal: Physical Therapy Goal  Goals to be met by: 2018     Patient will increase functional independence with mobility by performin. Supine to sit with Modified [place] met 3/15/18  2. Sit to stand transfer with Modified [place] met 3/15/18  3. Gait  x >200 feet with Modified [place] using no AD. 3/15/18     Outcome: Ongoing (interventions implemented as appropriate)  Pt has met all above goals.

## 2018-03-15 NOTE — DISCHARGE SUMMARY
[facility]  Discharge Summary     Patient ID:  Humberto Liz  [id number]  32 y.o.  1985    Admit date: 3/10/2018    Discharge Date and Time: 3/15/2018  4:39 PM     Admitting Physician: Rigoberto Ricks MD     Discharge Provider: Shreyas Norris    Reason for Admission: Alcohol-induced acute pancreatitis, unspecified complication status [K85.20]  Alcohol-induced acute pancreatitis, unspecified complication status [K85.20]  Alcohol-induced acute pancreatitis, unspecified complication status [K85.20]    Admission Condition: poor    Procedures Performed: * No surgery found *     HPI: Patient is a 32 year old male with no medical history presenting to the ED after awakening with severe abdominal pain and nausea followed by multiple episodes of emesis.  Patient reports drinking 1 pint of Juan Leyva yesterday.  States he woke up with nausea and pain this morning and when he drank a few sips of water he reports it was followed by multiple episodes of emesis.  He decided to present to the ED as the pain did not subside on its own.  Reports 9/10 abdominal pain with minimal radiation to the back.  Denies trying anything at home for the pain, no aggravating/alleviating factors. He states he is a daily 1/2 to 1 pint Juan Leyva drinker on a daily basis.  Has experienced the shakes and tremors in the past and states he had continued to drink to help alleviate the symptoms.  Has never been hospitalized for this problem.  He denies any illicit drug abuse.  Current 10 cigarettes per day smoker for the past 20 years.  At time of evaluation pain 6/10 (after receiving dose of pain medications).    Hospital Course Humberto Liz 32 y.o. male admitted to the hospital for evaluation and management of acute pancreatitis due to alcohol abuse. In the ED, patient had a Lipase >1000 and an abdominal CT consistent with acute pancreatitis. Patient was made NPO, given 1L of fluids and provided pain control. CIWA score 0 on  admit.  48 hours into admission patient became increasingly agitated and required physical restraints as well as ativan for alcohol withdrawal, had a CIWA score of 18+ at this time. Multiple doses of ativan was attempted that did not improve patient's withdrawal. Patient was transitioned to the ICU and started on a Precedex drip for delirium tremens. After spending 24hrs in ICU, patient was weaned from Precedex and started on a Librium taper. Patient was stepped down to the floor. The patient's diet was advanced as tolerated until he was eating a full meal at time of discharge. Psychiatry came to see the patient recommended librium for further alcohol withdrawal and to follow up at [facility] regarding desire to stop drinking alcohol. At time of discharge, patient was tolerating a full diet, without sign of alcohol withdrawal. Patient was stable at discharge.     Consults: Psychiatry    Significant Diagnostic Studies: Lipase>1000   CT abdomen:   Findings concerning for acute pancreatitis, correlation advised.  No focal organized fluid collection noting scattered disorganized abdominopelvic ascites.    2.  Diffuse hepatic hypoattenuation, suggesting steatosis, correlation with LFTs recommended.    3.  Mild prominence of the urinary bladder walls, correlation with urinalysis recommended.    4.  Reactive thickening of the second and third portions of the duodenum, developing pneumonitis, secondary, not excluded.    5.  Several additional findings above.    Final Diagnoses:    Principal Problem: Delirium tremens   Secondary Diagnoses:   Active Hospital Problems    Diagnosis  POA    *Delirium tremens [F10.231]  Yes    Alcohol-induced acute pancreatitis [K85.20]  Yes    Alcohol dependence [F10.20]  Unknown    Tobacco abuse [Z72.0]  Unknown      Resolved Hospital Problems    Diagnosis Date Resolved POA   No resolved problems to display.       Discharged Condition: good    Dischrage  Exam:  Gen: Well developed well nourished, appears stated age in no distress.  CV: S1S2, RRR, no m/g/r  Resp: CTA BL, no acute respiratory distress  Ab: soft, tender to palpation in epigastrium. Non-disteneded  Ext: no edema. No tremors  Psych: Oriented to person, place and time. Denies Auditory and visual hallucinations. Denies feelings of anxiety    Disposition: Home or Self Care    Follow Up/Patient Instructions:     Medications:  Reconciled Home Medications:   Discharge Medication List as of 3/15/2018  3:48 PM      START taking these medications    Details   chlordiazepoxide (LIBRIUM) 10 MG capsule Take 1 capsule (10 mg total) by mouth once daily., Starting Thu 3/15/2018, Until Sat 3/17/2018, Print      folic acid (FOLVITE) 1 MG tablet Take 1 tablet (1 mg total) by mouth once daily., Starting Fri 3/16/2018, Until Sat 3/16/2019, Normal      potassium chloride SA (K-DUR,KLOR-CON) 20 MEQ tablet Take 2 tablets (40 mEq total) by mouth 2 (two) times daily., Starting Thu 3/15/2018, Until Thu 3/29/2018, Normal      thiamine 100 MG tablet Take 1 tablet (100 mg total) by mouth once daily., Starting Fri 3/16/2018, Until Sat 3/16/2019, Normal             Discharge Procedure Orders  Diet Adult Regular     Activity as tolerated     Notify your health care provider if you experience any of the following:  temperature >100.4     Notify your health care provider if you experience any of the following:  persistent nausea and vomiting or diarrhea     Notify your health care provider if you experience any of the following:  severe uncontrolled pain     Notify your health care provider if you experience any of the following:  redness, tenderness, or signs of infection (pain, swelling, redness, odor or green/yellow discharge around incision site)       Follow-up Information     Delgado Fam PA-C On 3/27/2018.    Specialty:  Family Medicine  Why:  @9:40am. Hospital follow up  Contact information:  [address]  [place] [address]  [phone]             Go to [facility].    Specialties:  Child and Adolescent Psychiatry, Psychology, Psychiatry  Why:  for Mental health services follow up-  (walk-in clinic for eval and medication assisstance between 9am-2pm Monday- thursday)  Contact information:  [address]  [address]  [place] [address]  [phone]                 Activity: activity as tolerated  Diet: regular diet  Wound Care: keep wound clean and dry and none needed    Follow-up with [facility] in 7 days.    Greater than 30 minutes was spent on discharge planning with this patient      Signed:  Shreyas Norris  3/16/2018  1:48 PM

## 2018-03-15 NOTE — PT/OT/SLP PROGRESS
Physical Therapy Treatment    Patient Name:  Humberto Liz   MRN:  [id number]    Recommendations:     Discharge Recommendations:  home   Discharge Equipment Recommendations:     Barriers to discharge: None    Assessment:     Humberto Liz is a 32 y.o. male admitted with a medical diagnosis of Delirium tremens.  He presents with the following impairments/functional limitations:  weakness (impaired balance with balance acts.  Steady gait on level surface). Pt would continue to benefit from P.T. To improved strengthen and balance.    Rehab Prognosis:  Good+; patient would benefit from acute skilled PT services to address these deficits and reach maximum level of function.      Recent Surgery: * No surgery found *      Plan:     During this hospitalization, patient to be seen 5 x/week to address the above listed problems via gait training, therapeutic activities, therapeutic exercises  · Plan of Care Expires:  04/13/18   Plan of Care Reviewed with: patient, family    Subjective     Communicated with RN (Carlos) prior to session.  Patient found supine upon PT entry to room, agreeable to treatment.      Patient comments/goals: Pt agreed to tx.  Pain/Comfort:  · Pain Rating 1: 0/10  · Pain Rating Post-Intervention 1: 0/10    Patients cultural, spiritual, Spiritism conflicts given the current situation:      Objective:     Patient found with:       General Precautions: Standard, fall   Orthopedic Precautions:N/A   Braces:       Functional Mobility:  · Bed Mobility:     · Rolling Right: independence  · Supine to Sit: independence  · Sit to Supine: independence  · Transfers:     · Sit to Stand:  independence and modified independence with no AD  · Gait: >500ft without A.D. and Mod I/I on level surfaces.    · Balance: sitting good+, standing without A.D. good+, gait on level surface without A.D. good.      AM-PAC 6 CLICK MOBILITY  Turning over in bed (including adjusting bedclothes, sheets and blankets)?: 4  Sitting down on and  standing up from a chair with arms (e.g., wheelchair, bedside commode, etc.): 4  Moving from lying on back to sitting on the side of the bed?: 4  Moving to and from a bed to a chair (including a wheelchair)?: 4  Need to walk in hospital room?: 4  Climbing 3-5 steps with a railing?: 3  Total Score: 23       Therapeutic Activities and Exercises:   Balance activity:  Tandem gait, 1/2 braiding, full braiding, retro gait, 15ft x 2 with CGA/SBA with 2 bouts of [place] for mild LOB.    Patient left supine with all lines intact, call button in reach and RN notified..    GOALS:    Physical Therapy Goals        Problem: Physical Therapy Goal    Goal Priority Disciplines Outcome Goal Variances Interventions   Physical Therapy Goal     PT/OT, PT Ongoing (interventions implemented as appropriate)     Description:  Goals to be met by: 2018     Patient will increase functional independence with mobility by performin. Supine to sit with Modified [place] met 3/15/18  2. Sit to stand transfer with Modified [place] met 3/15/18  3. Gait  x >200 feet with Modified [place] using no AD. 3/15/18                       Time Tracking:     PT Received On: 03/15/18  PT Start Time: 1515     PT Stop Time: 1538  PT Total Time (min): 23 min     Billable Minutes: Gait Training 13 and Therapeutic Activity 10    Treatment Type: Treatment  PT/PTA: PTA     PTA Visit Number: 1     Merle Stewart PTA  03/15/2018

## 2018-03-16 NOTE — PSYCH
"IDENTIFICATION DATA:  This is a 32-year-old [place] single male who was admitted   due to alcohol problem and abdominal pain.  This consult is requested by Dr. Ricks for psych evaluation.  The patient is not on a PEC status.    CHIEF COMPLAINT:  "I was drinking too much."    HISTORY OF PRESENT ILLNESS:  According to H and P, the patient was admitted due   to abdominal pain, nausea, vomiting, pancreatitis, and alcohol withdrawals.  He   reported that he was drinking about half to one pint of Juan Leyva every day   and he is drinking more for the last five years.  He denies history of   withdrawals including shakes, seizures and hallucinations in the past.  His   longest sobriety is not more than four months.  He does not do drugs.  He states   that he has no depression.  He had seen rough time in [place], which is a new   name for [place] [facility].  The patient states that Buddhists were killing people over   there and he flew and moved to [place].  The patient likes staying in [place].    He reported that alcohol is affecting his health, job and money.  He works at   [email or web address] as a .  The patient denies any anxiety, flashbacks, nightmares, dreams   or obsessive-compulsive features.  He states that his sleep is good.  He has no   confusion, disorientation, or fear.  He denies hearing voices or seeing things.    PAST PSYCHIATRIC HISTORY:  The patient has no previous in or outpatient   psychiatric treatment.  He has no history of suicide or homicide.  He has never   been in alcohol and drug rehab program.  He denies DWIs, arrests, or legal   problems.    SOCIAL AND FAMILY HISTORY:  The patient was born and raised in [place].  He   described his childhood as bad because of problems in his country.  He lives by   himself.  He works at [employer].  He has a college education.  He lives in his   own apartment.  His brother also drinks, but no suicide and mental illness in   the family.    MEDICAL HISTORY:  The patient has " pancreatitis.  His bilirubin was 2.2, is now   1.8.  AST 80, ALT 58.  His sodium is 148 and potassium 3.0.  The patient's   amylase was high upon arrival.  He has no more nausea, vomiting.  His CBC is   unremarkable.  He is not allergic to any medicine or food.    His blood pressure was 115/70, with 112 pulse upon arrival, now it is 117/77,   with 89 pulse.  He is on tapering doses of Librium, he was started on Valium.  His   alcohol level upon arrival was 218.  He required restraints and Ativan in the   beginning.    MENTAL STATUS EXAMINATION:  This is a 32-year-old short statured [place]kin[place] male   who looks about his stated age.  He is alert, cooperative and oriented to day,   date, month and year.  Mood is euthymic with appropriate affect.      No confusion, disorientation or disorganized thoughts noted.  He is able to   recall 3 objects out of 3 immediately, 3 out of 3 after 5 minutes and events of   the past.  He has no hallucinations, delusions, suicidal thoughts.  Insight and   judgment are getting better.  He is of average intelligent person.    PSYCHIATRIC DIAGNOSES:  AXIS I:  Alcohol use disorder, severe, without perceptual disturbance.  AXIS II:  No diagnosis.  AXIS III:  Pancreatitis.  AXIS IV:  Medical problems, occupational problem.  AXIS V:  35.    RECOMMENDATIONS:  Continue current meds as ordered.  We will reduce the patient's Librium to 10 mg for two dosages   and Librium 10 mg of one dose and then discontinue.  The patient could be   discharged back to home.  He is no more in withdrawals and has no psychosis,   depression, suicidal or homicidal thoughts.  The patient is working at [employer]   and wanted to go back to home so that he could return to work.  Follow up with   [facility] for medications.          / [id number] inessa(s)       AI/HN  dd: 03/15/2018 10:46:28 (CDT)  td: 03/15/2018 19:08:06 (CDT)  Doc ID   #[id number]  Job ID #[id number]    CC:

## 2018-03-27 ENCOUNTER — OFFICE VISIT (OUTPATIENT)
Dept: FAMILY MEDICINE | Facility: HOSPITAL | Age: 33
End: 2018-03-27
Attending: FAMILY MEDICINE

## 2018-03-27 VITALS
WEIGHT: 115.31 LBS | DIASTOLIC BLOOD PRESSURE: 67 MMHG | SYSTOLIC BLOOD PRESSURE: 111 MMHG | HEIGHT: 64 IN | HEART RATE: 94 BPM | BODY MASS INDEX: 19.68 KG/M2

## 2018-03-27 DIAGNOSIS — F10.21 ALCOHOL DEPENDENCE IN REMISSION: ICD-10-CM

## 2018-03-27 DIAGNOSIS — Z72.0 TOBACCO ABUSE: ICD-10-CM

## 2018-03-27 DIAGNOSIS — K85.20 ALCOHOL-INDUCED ACUTE PANCREATITIS WITHOUT INFECTION OR NECROSIS: Primary | ICD-10-CM

## 2018-03-27 PROCEDURE — 99213 OFFICE O/P EST LOW 20 MIN: CPT | Performed by: PHYSICIAN ASSISTANT

## 2018-03-27 NOTE — PROGRESS NOTES
FAMILY MEDICINE  New Visit Progress Note   Recent Hospital Discharge     PRESENTING HISTORY     Chief Complaint/Reason for Admission:  Follow up Hospital Discharge   Chief Complaint   Patient presents with    Hospital Follow Up     PCP: Primary Doctor No    History of Present Illness:  Mr. Humberto Liz is a 32 y.o. male who was recently admitted to the hospital.    Admit Date: 3/10/2018  Discharge Date: 03/15/2018  ___________________________________________________________________    Today:  Patient seen today in [facility] for hospital follow up.  Recently hospitalized for acute alcohol induced pancreatitis.   Arrived in ED due to abdominal pain, nausea, and vomiting after drinking 1 pint of Juan Leyva the day before.  Pt had lipase greater than 1000 in the ED and CT abdomen consistent with acute pancreatitis.   Made NPO, given IV fluids and pain medication.  CIWA score on admission was 0; 48 hours into admission CIWA 18+ requiring restraints and ativan which did not improve the withdrawal symptoms.   Transitioned to ICU and started on a precedex drip for DTs.  ICU for 24 hours, weaned from Precedex, and started on Librium taper.  Stepped down, tolerating full diet at time of discharge.    Psychiatry was consulted and recommended extended Librium taper.  Also, recommended follow up at [facility] regarding quitting drinking.     Patient accompanied by wife and child today.  Patient is doing well since discharge. Has not been drinking since the time of discharge.   Denies n/v/d, tolerating normal PO diet.  Did not follow up with [facility] to help with quitting.   Pt has quit on his own.   Encouraged pt to think about AA or similar support system.   Having neck/back pain x1 week.      Review of Systems  General ROS: negative for chills, fever or weight loss  Psychological ROS: negative for hallucination, depression or suicidal  ideation  Ophthalmic ROS: negative for blurry vision, photophobia or eye pain  ENT ROS: negative for epistaxis, sore throat or rhinorrhea  Respiratory ROS: no cough, shortness of breath, or wheezing  Cardiovascular ROS: no chest pain or dyspnea on exertion  Gastrointestinal ROS: no abdominal pain, change in bowel habits, or black/ bloody stools  Genito-Urinary ROS: no dysuria, trouble voiding, or hematuria  Musculoskeletal ROS: negative for gait disturbance or muscular weakness  +neck/back pain  Neurological ROS: no syncope or seizures; no ataxia  Dermatological ROS: negative for pruritis, rash and jaundice          PAST HISTORY:     Past Medical History:   Diagnosis Date    Seizures        Past Surgical History:   Procedure Laterality Date    APPENDECTOMY         No family history on file.    Social History     Social History    Marital status:      Spouse name: N/A    Number of children: N/A    Years of education: N/A     Social History Main Topics    Smoking status: Current Every Day Smoker     Packs/day: 0.50     Years: 5.00     Types: Cigarettes     Start date: 3/10/2012    Smokeless tobacco: Never Used    Alcohol use 9.0 oz/week     15 Shots of liquor per week      Comment: half pint nightly/ last drink 3/09/2018    Drug use: Unknown    Sexual activity: Not on file     Other Topics Concern    Not on file     Social History Narrative    No narrative on file       MEDICATIONS & ALLERGIES:     Current Outpatient Prescriptions on File Prior to Visit   Medication Sig Dispense Refill    chlordiazepoxide (LIBRIUM) 10 MG capsule Take 1 capsule (10 mg total) by mouth once daily. 2 capsule 0    folic acid (FOLVITE) 1 MG tablet Take 1 tablet (1 mg total) by mouth once daily. 30 tablet 11    potassium chloride SA (K-DUR,KLOR-CON) 20 MEQ tablet Take 2 tablets (40 mEq total) by mouth 2 (two) times daily. 56 tablet 0    thiamine 100 MG tablet Take 1 tablet (100 mg total) by mouth once daily. 30 tablet  "11     No current facility-administered medications on file prior to visit.         Review of patient's allergies indicates:  No Known Allergies    OBJECTIVE:     Vital Signs:  Vitals:    03/27/18 0926   BP: 111/67   Pulse: 94     Wt Readings from Last 1 Encounters:   03/11/18 0500 50 kg (110 lb 3.7 oz)   03/10/18 1905 58.1 kg (128 lb)   03/10/18 1346 58.1 kg (128 lb)     Body mass index is 19.79 kg/m².        Physical Exam:  /67   Pulse 94   Ht 5' 4" (1.626 m)   Wt 52.3 kg (115 lb 4.8 oz)   BMI 19.79 kg/m²   General appearance: alert, cooperative, no distress  Constitutional:Oriented to person, place, and time.appears well-developed and well-nourished.  HEENT: Normocephalic, atraumatic, neck symmetrical, no nasal discharge   Eyes: conjunctivae/corneas clear, EOM's intact  Lungs: clear to auscultation bilaterally, no r/r/w  Heart: regular rate and rhythm without m/g/r  Abdomen: soft, non-tender; bowel sounds normoactive  Extremities: extremities symmetric; no clubbing, cyanosis, or edema  Integument: Skin color, texture, turgor normal  Neurologic: Alert and oriented X 3, normal strength, normal coordination and gait  Psychiatric: no pressured speech; normal affect; no evidence of impaired cognition     Laboratory  Lab Results   Component Value Date    WBC 6.35 03/15/2018    HGB 13.4 (L) 03/15/2018    HCT 38.9 (L) 03/15/2018    MCV 95 03/15/2018     03/15/2018     BMP  Lab Results   Component Value Date     03/15/2018    K 3.0 (L) 03/15/2018     03/15/2018    CO2 25 03/15/2018    BUN 3 (L) 03/15/2018    CREATININE 0.7 03/15/2018    CALCIUM 9.1 03/15/2018    ANIONGAP 11 03/15/2018    ESTGFRAFRICA >60 03/15/2018    EGFRNONAA >60 03/15/2018     Lab Results   Component Value Date    ALT 58 (H) 03/15/2018    AST 80 (H) 03/15/2018    ALKPHOS 90 03/15/2018    BILITOT 1.8 (H) 03/15/2018     Lab Results   Component Value Date    INR 1.1 03/11/2018     Lab Results   Component Value Date    HGBA1C " 4.5 03/10/2018     No results for input(s): POCTGLUCOSE in the last 72 hours.    Diagnostic Results:  CT Abdomen  Impression       1.  Findings concerning for acute pancreatitis, correlation advised.  No focal organized fluid collection noting scattered disorganized abdominopelvic ascites.    2.  Diffuse hepatic hypoattenuation, suggesting steatosis, correlation with LFTs recommended.    3.  Mild prominence of the urinary bladder walls, correlation with urinalysis recommended.    4.  Reactive thickening of the second and third portions of the duodenum, developing pneumonitis, secondary, not excluded.    5.  Several additional findings above.               ASSESSMENT & PLAN:       Alcohol-induced acute pancreatitis without infection or necrosis  -Lipase >1000 in ED  -CT showed findings concerning for acute pancreatitis  -Given IV fluid and pain medication  -Tolerating full diet before discharge  -Discharged on folic acid, potassium, thiamine, and librium  -All medications discontinued today in lieu of a multivitamin      Alcohol dependence in remission  -1 pint of Juan Leyva/day x 3 years  -Completed Librium taper  -No signs/symptoms of withdrawal today  -Encouraged to seek social support; AA meeting  -Psychiatry recommended [facility] to help with quitting  -Has been sober since hospital discharge    Tobacco abuse  -Smokes 0.5 ppd x10 years  -Pt not ready to quit  -Discussed cutting back and possibly medications to help quit in the future  -Will follow up with PCP    Patient will need to be assigned to a doctor in the clinic. Follow up in ~3 months.      Instructions for the patient:      Scheduled Follow-up :  No future appointments.    Post Visit Medication List:     Medication List          Accurate as of 3/27/18 10:34 AM. If you have any questions, ask your nurse or doctor.               STOP taking these medications    chlordiazepoxide 10 MG capsule  Commonly known as:   LIBRIUM  Stopped by:  Delgado Fam PA-C     folic acid 1 MG tablet  Commonly known as:  FOLVITE  Stopped by:  Delgado Fam PA-C     potassium chloride SA 20 MEQ tablet  Commonly known as:  K-DUR,KLOR-FLIP  Stopped by:  Delgado Fam PA-C     thiamine 100 MG tablet  Stopped by:  Delgado Fam PA-C            Signing Physician:  Delgado Fam PA-C

## 2018-09-23 ENCOUNTER — HOSPITAL ENCOUNTER (EMERGENCY)
Facility: HOSPITAL | Age: 33
Discharge: HOME | End: 2018-09-23
Attending: EMERGENCY MEDICINE

## 2018-09-23 VITALS
BODY MASS INDEX: 19.16 KG/M2 | RESPIRATION RATE: 14 BRPM | WEIGHT: 115 LBS | OXYGEN SATURATION: 99 % | TEMPERATURE: 98 F | HEIGHT: 65 IN | DIASTOLIC BLOOD PRESSURE: 78 MMHG | SYSTOLIC BLOOD PRESSURE: 127 MMHG | HEART RATE: 82 BPM

## 2018-09-23 DIAGNOSIS — K85.20 ALCOHOL-INDUCED ACUTE PANCREATITIS, UNSPECIFIED COMPLICATION STATUS: Primary | ICD-10-CM

## 2018-09-23 DIAGNOSIS — R74.01 TRANSAMINITIS: ICD-10-CM

## 2018-09-23 DIAGNOSIS — F10.10 ETOH ABUSE: ICD-10-CM

## 2018-09-23 LAB
ALBUMIN SERPL BCP-MCNC: 4.2 G/DL
ALP SERPL-CCNC: 82 U/L
ALT SERPL W/O P-5'-P-CCNC: 95 U/L
ANION GAP SERPL CALC-SCNC: 16 MMOL/L
AST SERPL-CCNC: 284 U/L
BASOPHILS # BLD AUTO: 0.05 K/UL
BASOPHILS NFR BLD: 1.6 %
BILIRUB SERPL-MCNC: 1 MG/DL
BILIRUB UR QL STRIP: NEGATIVE
BUN SERPL-MCNC: 3 MG/DL
CALCIUM SERPL-MCNC: 8.7 MG/DL
CHLORIDE SERPL-SCNC: 102 MMOL/L
CLARITY UR REFRACT.AUTO: CLEAR
CO2 SERPL-SCNC: 26 MMOL/L
COLOR UR AUTO: YELLOW
CREAT SERPL-MCNC: 0.7 MG/DL
DIFFERENTIAL METHOD: ABNORMAL
EOSINOPHIL # BLD AUTO: 0.3 K/UL
EOSINOPHIL NFR BLD: 9.4 %
ERYTHROCYTE [DISTWIDTH] IN BLOOD BY AUTOMATED COUNT: 12.8 %
EST. GFR  (AFRICAN AMERICAN): >60 ML/MIN/1.73 M^2
EST. GFR  (NON AFRICAN AMERICAN): >60 ML/MIN/1.73 M^2
GLUCOSE SERPL-MCNC: 96 MG/DL
GLUCOSE UR QL STRIP: NEGATIVE
HCT VFR BLD AUTO: 42.6 %
HGB BLD-MCNC: 14.8 G/DL
HGB UR QL STRIP: NEGATIVE
IMM GRANULOCYTES # BLD AUTO: 0 K/UL
IMM GRANULOCYTES NFR BLD AUTO: 0 %
KETONES UR QL STRIP: NEGATIVE
LEUKOCYTE ESTERASE UR QL STRIP: NEGATIVE
LIPASE SERPL-CCNC: 155 U/L
LYMPHOCYTES # BLD AUTO: 1.3 K/UL
LYMPHOCYTES NFR BLD: 41.1 %
MCH RBC QN AUTO: 32.6 PG
MCHC RBC AUTO-ENTMCNC: 34.7 G/DL
MCV RBC AUTO: 94 FL
MONOCYTES # BLD AUTO: 0.4 K/UL
MONOCYTES NFR BLD: 11.3 %
NEUTROPHILS # BLD AUTO: 1.1 K/UL
NEUTROPHILS NFR BLD: 36.6 %
NITRITE UR QL STRIP: NEGATIVE
NRBC BLD-RTO: 0 /100 WBC
PH UR STRIP: 7 [PH] (ref 5–8)
PLATELET # BLD AUTO: 123 K/UL
PMV BLD AUTO: 9.5 FL
POTASSIUM SERPL-SCNC: 3.4 MMOL/L
PROT SERPL-MCNC: 7.5 G/DL
PROT UR QL STRIP: NEGATIVE
RBC # BLD AUTO: 4.54 M/UL
SODIUM SERPL-SCNC: 144 MMOL/L
SP GR UR STRIP: 1 (ref 1–1.03)
URN SPEC COLLECT METH UR: NORMAL
UROBILINOGEN UR STRIP-ACNC: NEGATIVE EU/DL
WBC # BLD AUTO: 3.09 K/UL

## 2018-09-23 PROCEDURE — 25000003 PHARM REV CODE 250: Performed by: PHYSICIAN ASSISTANT

## 2018-09-23 PROCEDURE — 80053 COMPREHEN METABOLIC PANEL: CPT

## 2018-09-23 PROCEDURE — 63600175 PHARM REV CODE 636 W HCPCS: Performed by: PHYSICIAN ASSISTANT

## 2018-09-23 PROCEDURE — 99284 EMERGENCY DEPT VISIT MOD MDM: CPT | Mod: ,,, | Performed by: PHYSICIAN ASSISTANT

## 2018-09-23 PROCEDURE — 96374 THER/PROPH/DIAG INJ IV PUSH: CPT

## 2018-09-23 PROCEDURE — 83690 ASSAY OF LIPASE: CPT

## 2018-09-23 PROCEDURE — 85025 COMPLETE CBC W/AUTO DIFF WBC: CPT

## 2018-09-23 PROCEDURE — 96361 HYDRATE IV INFUSION ADD-ON: CPT

## 2018-09-23 PROCEDURE — 81003 URINALYSIS AUTO W/O SCOPE: CPT

## 2018-09-23 PROCEDURE — 99283 EMERGENCY DEPT VISIT LOW MDM: CPT | Mod: 25

## 2018-09-23 RX ORDER — ONDANSETRON 4 MG/1
4 TABLET, FILM COATED ORAL EVERY 6 HOURS
Qty: 12 TABLET | Refills: 0 | Status: SHIPPED | OUTPATIENT
Start: 2018-09-23 | End: 2019-01-18

## 2018-09-23 RX ORDER — KETOROLAC TROMETHAMINE 30 MG/ML
15 INJECTION, SOLUTION INTRAMUSCULAR; INTRAVENOUS
Status: COMPLETED | OUTPATIENT
Start: 2018-09-23 | End: 2018-09-23

## 2018-09-23 RX ORDER — IBUPROFEN 200 MG
200 TABLET ORAL EVERY 6 HOURS PRN
COMMUNITY

## 2018-09-23 RX ADMIN — KETOROLAC TROMETHAMINE 15 MG: 30 INJECTION, SOLUTION INTRAMUSCULAR at 08:09

## 2018-09-23 RX ADMIN — SODIUM CHLORIDE 1000 ML: 0.9 INJECTION, SOLUTION INTRAVENOUS at 06:09

## 2018-09-23 NOTE — ED PROVIDER NOTES
Encounter Date: 9/23/2018    SCRIBE #1 NOTE: I, Natalie Petersen, am scribing for, and in the presence of,  Dr. Gaines. I have scribed the following portions of the note - the APC attestation. Other sections scribed: the Disposition.       History     Chief Complaint   Patient presents with    Abdominal Pain     onset yesterday am - deneis n/v/d/constipation/fever/dysuria     33 year old male with medical history of Tobacco use, ETOH use, Hx of pancreatitis presenting to the ED with the chief complaint of abdominal pain. Patient reports waking up with LUQ abdominal pain yesterday AM. He describes the pain as a constant, localized, burning sensation. He denies fever, nausea, vomiting, dysuria, hematuria, diarrhea, constipation, melena, hematochezia. Patient's last BM was this AM and normal. He reports no appetite changes and was able to tolerate a large meal earlier today. Patient reports daily ETOH use and estimates to drink 1/2 pint of whiskey every 1-2 days. Patient's last drink was yesterday. Past surgical history significant for appendectomy. No recent falls or trauma.           Review of patient's allergies indicates:  No Known Allergies  Past Medical History:   Diagnosis Date    Seizures      Past Surgical History:   Procedure Laterality Date    APPENDECTOMY       Family History   Problem Relation Age of Onset    No Known Problems Mother     No Known Problems Father      Social History     Tobacco Use    Smoking status: Current Every Day Smoker     Packs/day: 0.33     Years: 5.00     Pack years: 1.65     Types: Cigarettes     Start date: 3/10/2012    Smokeless tobacco: Never Used   Substance Use Topics    Alcohol use: No     Comment: quit on 03/09/2018    Drug use: No     Review of Systems   Constitutional: Negative for chills, diaphoresis and fever.   HENT: Negative for congestion, sore throat and trouble swallowing.    Eyes: Negative for pain and redness.   Respiratory: Negative for cough and  shortness of breath.    Cardiovascular: Negative for chest pain.   Gastrointestinal: Positive for abdominal pain (LUQ). Negative for diarrhea, nausea and vomiting.   Genitourinary: Negative for dysuria and hematuria.   Musculoskeletal: Negative for back pain, neck pain and neck stiffness.   Skin: Negative for rash and wound.   Neurological: Negative for weakness, light-headedness and headaches.     Physical Exam     Initial Vitals [09/23/18 1745]   BP Pulse Resp Temp SpO2   123/82 98 16 97.2 °F (36.2 °C) 96 %      MAP       --         Physical Exam    Constitutional: He appears well-developed and well-nourished. He is not diaphoretic.  Non-toxic appearance. He does not appear ill.   Alcohol odor present   HENT:   Head: Normocephalic and atraumatic.   Mouth/Throat: Oropharynx is clear and moist. No oropharyngeal exudate.   Eyes: EOM are normal. Pupils are equal, round, and reactive to light.   Neck: Normal range of motion. Neck supple.   Cardiovascular: Normal rate and regular rhythm.   Pulmonary/Chest: Effort normal and breath sounds normal. No respiratory distress.   Speaking in full sentences without difficulty   Abdominal: Normal appearance. There is tenderness in the left upper quadrant. There is no rebound, no CVA tenderness and negative Suh's sign.   Genitourinary:   Genitourinary Comments: No CVA tenderness   Musculoskeletal: Normal range of motion. He exhibits no edema or tenderness.   Neurological: He is alert and oriented to person, place, and time. He has normal strength. No cranial nerve deficit or sensory deficit.   Follows commands appropriately. Ambulates without difficulty.   Skin: Skin is warm and dry.         ED Course   Procedures  Labs Reviewed   CBC W/ AUTO DIFFERENTIAL - Abnormal; Notable for the following components:       Result Value    WBC 3.09 (*)     RBC 4.54 (*)     MCH 32.6 (*)     Platelets 123 (*)     Gran # (ANC) 1.1 (*)     Gran% 36.6 (*)     Eosinophil% 9.4 (*)     All other  components within normal limits   COMPREHENSIVE METABOLIC PANEL - Abnormal; Notable for the following components:    Potassium 3.4 (*)     BUN, Bld 3 (*)      (*)     ALT 95 (*)     All other components within normal limits   LIPASE - Abnormal; Notable for the following components:    Lipase 155 (*)     All other components within normal limits   URINALYSIS, REFLEX TO URINE CULTURE    Narrative:     cup  Preferred Collection Type->Urine, Clean Catch          Imaging Results    None          Medical Decision Making:   History:   Old Medical Records: I decided to obtain old medical records.  Clinical Tests:   Lab Tests: Ordered and Reviewed       APC / Resident Notes:   33 year old male with medical history of Tobacco use, ETOH use, Hx of pancreatitis presenting to the ED c/o 2 days of LUQ abdominal pain. DDx includes but not limited to pancreatitis, gastritis, electrolyte disturbance, dehydration. I have considered but do not suspect cholecystitis, GI obstruction, mesenteric ischemia. Will get abdominal labs and give fluids. Will give Toradol for pain.     Work-up shows WBC 3.0, H/H 14/42, mild hypokalemia 3.4, Creatine 0.7, , ALT 95, Tbil 1.0, AG 16, Lipase 155, UA negative for UTI    Etiology most consistent with ETOH induced pancreatitis. Patient tolerating PO and reports moderate improvement of his symptoms on reassessment. Patient stable for outpatient management. Instructed patient to continue hydration by drinking plenty of water. Advised patient to avoid ETOH consumption. Will give RX for Zofran. Advised patient to follow-up with PCP in 1 week for re-evaluation. Return to ED precautions given for new, worsening, or concerning symptoms. I have discussed the care of this patient with my supervising physician.        Scribe Attestation:   Scribe #1: I performed the above scribed service and the documentation accurately describes the services I performed. I attest to the accuracy of the  note.    Attending Attestation:     Physician Attestation Statement for NP/PA:   I discussed this assessment and plan of this patient with the NP/PA, but I did not personally examine the patient. The face to face encounter was performed by the NP/PA.                     Clinical Impression:   The primary encounter diagnosis was Alcohol-induced acute pancreatitis, unspecified complication status. Diagnoses of ETOH abuse and Transaminitis were also pertinent to this visit.      Disposition:   Disposition: Discharged  Condition: Stable                        tIalo Ricardo PA-C  09/23/18 4568

## 2018-09-23 NOTE — ED TRIAGE NOTES
Presents to ER with complaint of LLQ pain, nausea, and vomiting.  Patient's name and date of birth checked and is correct.    LOC: The patient is awake, alert, and oriented to place, time, situation. Affect is appropriate.  Speech is appropriate and clear.      APPEARANCE: Patient resting comfortably, reporting palpation, light headedness,  in no acute distress.  Patient is clean and well groomed.     SKIN: The skin is warm and dry; color consistent with ethnicity.  Patient has normal skin turgor and moist mucus membranes.  Skin intact; no breakdown or bruising noted.      MUSCULOSKELETAL: Patient moving upper and lower extremities without difficulty.  Denies weakness.      RESPIRATORY: Airway is open and patent. Respirations spontaneous, even, easy, and non-labored.  Patient has a normal effort and rate.  No accessory muscle use noted. Denies cough.  BS clear.     CARDIAC:  No peripheral edema noted. No complaints of chest pain.       ABDOMEN: Soft but tender to palpation LLQ.  No distention noted.      NEUROLOGIC: Eyes open spontaneously.  Behavior appropriate to situation.  Follows commands; facial expression symmetrical.  Purposeful motor response noted; normal sensation in all extremities.

## 2018-09-24 NOTE — DISCHARGE INSTRUCTIONS
Please hydrate by drinking plenty of water when you return home  Please follow-up with your primary care provider in 1 week for re-evaluation of your symptoms  Please avoid excessive alcohol consumption as this is what is causing you to have your stomach pain    Our goal in the emergency department is to always give you outstanding care and exceptional service. You may receive a survey by mail or e-mail in the next week regarding your experience in our ED. We would greatly appreciate your completing and returning the survey. Your feedback provides us with a way to recognize our staff who give very good care and it helps us learn how to improve when your experience was below our aspiration of excellence.

## 2018-11-28 ENCOUNTER — HOSPITAL ENCOUNTER (EMERGENCY)
Facility: HOSPITAL | Age: 33
Discharge: HOME | End: 2018-11-28
Attending: EMERGENCY MEDICINE

## 2018-11-28 VITALS
HEART RATE: 87 BPM | DIASTOLIC BLOOD PRESSURE: 89 MMHG | TEMPERATURE: 98 F | RESPIRATION RATE: 15 BRPM | BODY MASS INDEX: 16.14 KG/M2 | OXYGEN SATURATION: 100 % | SYSTOLIC BLOOD PRESSURE: 132 MMHG | WEIGHT: 97 LBS

## 2018-11-28 DIAGNOSIS — R10.9 ABDOMINAL PAIN: ICD-10-CM

## 2018-11-28 DIAGNOSIS — K85.20 ALCOHOL-INDUCED ACUTE PANCREATITIS WITHOUT INFECTION OR NECROSIS: Primary | ICD-10-CM

## 2018-11-28 DIAGNOSIS — R10.12 LEFT UPPER QUADRANT PAIN: ICD-10-CM

## 2018-11-28 DIAGNOSIS — R11.2 NON-INTRACTABLE VOMITING WITH NAUSEA, UNSPECIFIED VOMITING TYPE: ICD-10-CM

## 2018-11-28 DIAGNOSIS — R05.9 COUGH: ICD-10-CM

## 2018-11-28 LAB
ALBUMIN SERPL BCP-MCNC: 4.8 G/DL
ALP SERPL-CCNC: 101 U/L
ALT SERPL W/O P-5'-P-CCNC: 166 U/L
AMPHET+METHAMPHET UR QL: NEGATIVE
ANION GAP SERPL CALC-SCNC: 22 MMOL/L
AST SERPL-CCNC: 405 U/L
BACTERIA #/AREA URNS HPF: NORMAL /HPF
BARBITURATES UR QL SCN>200 NG/ML: NEGATIVE
BASOPHILS # BLD AUTO: 0.02 K/UL
BASOPHILS NFR BLD: 0.3 %
BENZODIAZ UR QL SCN>200 NG/ML: NEGATIVE
BILIRUB SERPL-MCNC: 2.9 MG/DL
BILIRUB UR QL STRIP: ABNORMAL
BUN SERPL-MCNC: 7 MG/DL
BZE UR QL SCN: NEGATIVE
CALCIUM SERPL-MCNC: 10.6 MG/DL
CANNABINOIDS UR QL SCN: NEGATIVE
CHLORIDE SERPL-SCNC: 93 MMOL/L
CK SERPL-CCNC: 84 U/L
CLARITY UR: CLEAR
CO2 SERPL-SCNC: 19 MMOL/L
COLOR UR: ABNORMAL
CREAT SERPL-MCNC: 0.7 MG/DL
CREAT UR-MCNC: 162.2 MG/DL
DIFFERENTIAL METHOD: ABNORMAL
EOSINOPHIL # BLD AUTO: 0.3 K/UL
EOSINOPHIL NFR BLD: 5.4 %
ERYTHROCYTE [DISTWIDTH] IN BLOOD BY AUTOMATED COUNT: 12.8 %
EST. GFR  (AFRICAN AMERICAN): >60 ML/MIN/1.73 M^2
EST. GFR  (NON AFRICAN AMERICAN): >60 ML/MIN/1.73 M^2
ETHANOL SERPL-MCNC: 32 MG/DL
GLUCOSE SERPL-MCNC: 94 MG/DL
GLUCOSE UR QL STRIP: NEGATIVE
HCT VFR BLD AUTO: 41.4 %
HGB BLD-MCNC: 14.5 G/DL
HGB UR QL STRIP: NEGATIVE
HYALINE CASTS #/AREA URNS LPF: 0 /LPF
KETONES UR QL STRIP: ABNORMAL
LEUKOCYTE ESTERASE UR QL STRIP: NEGATIVE
LIPASE SERPL-CCNC: >1000 U/L
LYMPHOCYTES # BLD AUTO: 0.9 K/UL
LYMPHOCYTES NFR BLD: 16.1 %
MCH RBC QN AUTO: 33.6 PG
MCHC RBC AUTO-ENTMCNC: 35 G/DL
MCV RBC AUTO: 96 FL
METHADONE UR QL SCN>300 NG/ML: NEGATIVE
MICROSCOPIC COMMENT: NORMAL
MONOCYTES # BLD AUTO: 0.8 K/UL
MONOCYTES NFR BLD: 14.7 %
NEUTROPHILS # BLD AUTO: 3.6 K/UL
NEUTROPHILS NFR BLD: 63.5 %
NITRITE UR QL STRIP: POSITIVE
OPIATES UR QL SCN: NEGATIVE
PCP UR QL SCN>25 NG/ML: NEGATIVE
PH UR STRIP: 7 [PH] (ref 5–8)
PLATELET # BLD AUTO: 106 K/UL
PMV BLD AUTO: 9.7 FL
POTASSIUM SERPL-SCNC: 3.7 MMOL/L
PROT SERPL-MCNC: 8.8 G/DL
PROT UR QL STRIP: ABNORMAL
RBC # BLD AUTO: 4.32 M/UL
RBC #/AREA URNS HPF: 2 /HPF (ref 0–4)
SODIUM SERPL-SCNC: 134 MMOL/L
SP GR UR STRIP: 1.02 (ref 1–1.03)
SQUAMOUS #/AREA URNS HPF: 2 /HPF
TOXICOLOGY INFORMATION: NORMAL
URN SPEC COLLECT METH UR: ABNORMAL
UROBILINOGEN UR STRIP-ACNC: NEGATIVE EU/DL
WBC # BLD AUTO: 5.73 K/UL
WBC #/AREA URNS HPF: 5 /HPF (ref 0–5)

## 2018-11-28 PROCEDURE — 80307 DRUG TEST PRSMV CHEM ANLYZR: CPT

## 2018-11-28 PROCEDURE — 80320 DRUG SCREEN QUANTALCOHOLS: CPT

## 2018-11-28 PROCEDURE — 83690 ASSAY OF LIPASE: CPT

## 2018-11-28 PROCEDURE — 25500020 PHARM REV CODE 255: Performed by: EMERGENCY MEDICINE

## 2018-11-28 PROCEDURE — 96374 THER/PROPH/DIAG INJ IV PUSH: CPT

## 2018-11-28 PROCEDURE — 99285 EMERGENCY DEPT VISIT HI MDM: CPT | Mod: 25

## 2018-11-28 PROCEDURE — 80053 COMPREHEN METABOLIC PANEL: CPT

## 2018-11-28 PROCEDURE — 96361 HYDRATE IV INFUSION ADD-ON: CPT

## 2018-11-28 PROCEDURE — 82550 ASSAY OF CK (CPK): CPT

## 2018-11-28 PROCEDURE — 85025 COMPLETE CBC W/AUTO DIFF WBC: CPT

## 2018-11-28 PROCEDURE — 63600175 PHARM REV CODE 636 W HCPCS: Performed by: EMERGENCY MEDICINE

## 2018-11-28 PROCEDURE — 25000003 PHARM REV CODE 250: Performed by: EMERGENCY MEDICINE

## 2018-11-28 PROCEDURE — 96375 TX/PRO/DX INJ NEW DRUG ADDON: CPT

## 2018-11-28 PROCEDURE — 81000 URINALYSIS NONAUTO W/SCOPE: CPT

## 2018-11-28 RX ORDER — KETOROLAC TROMETHAMINE 30 MG/ML
15 INJECTION, SOLUTION INTRAMUSCULAR; INTRAVENOUS
Status: COMPLETED | OUTPATIENT
Start: 2018-11-28 | End: 2018-11-28

## 2018-11-28 RX ORDER — ONDANSETRON 2 MG/ML
4 INJECTION INTRAMUSCULAR; INTRAVENOUS
Status: COMPLETED | OUTPATIENT
Start: 2018-11-28 | End: 2018-11-28

## 2018-11-28 RX ORDER — CHLORDIAZEPOXIDE HYDROCHLORIDE 25 MG/1
50 CAPSULE, GELATIN COATED ORAL
Status: COMPLETED | OUTPATIENT
Start: 2018-11-28 | End: 2018-11-28

## 2018-11-28 RX ORDER — ONDANSETRON 4 MG/1
4 TABLET, FILM COATED ORAL EVERY 6 HOURS PRN
Qty: 10 TABLET | Refills: 0 | Status: SHIPPED | OUTPATIENT
Start: 2018-11-28 | End: 2019-01-18

## 2018-11-28 RX ORDER — PROCHLORPERAZINE EDISYLATE 5 MG/ML
10 INJECTION INTRAMUSCULAR; INTRAVENOUS
Status: COMPLETED | OUTPATIENT
Start: 2018-11-28 | End: 2018-11-28

## 2018-11-28 RX ORDER — METOCLOPRAMIDE HYDROCHLORIDE 5 MG/ML
10 INJECTION INTRAMUSCULAR; INTRAVENOUS
Status: COMPLETED | OUTPATIENT
Start: 2018-11-28 | End: 2018-11-28

## 2018-11-28 RX ORDER — HYDROCODONE BITARTRATE AND ACETAMINOPHEN 10; 325 MG/1; MG/1
1 TABLET ORAL
Status: COMPLETED | OUTPATIENT
Start: 2018-11-28 | End: 2018-11-28

## 2018-11-28 RX ORDER — HYDROCODONE BITARTRATE AND ACETAMINOPHEN 7.5; 325 MG/1; MG/1
1 TABLET ORAL EVERY 6 HOURS PRN
Qty: 12 TABLET | Refills: 0 | Status: SHIPPED | OUTPATIENT
Start: 2018-11-28 | End: 2019-01-18

## 2018-11-28 RX ORDER — CHLORDIAZEPOXIDE HYDROCHLORIDE 25 MG/1
25 CAPSULE, GELATIN COATED ORAL
Status: DISCONTINUED | OUTPATIENT
Start: 2018-11-28 | End: 2018-11-28 | Stop reason: HOSPADM

## 2018-11-28 RX ORDER — CHLORDIAZEPOXIDE HYDROCHLORIDE 25 MG/1
25 CAPSULE, GELATIN COATED ORAL 4 TIMES DAILY PRN
Qty: 18 CAPSULE | Refills: 0 | Status: SHIPPED | OUTPATIENT
Start: 2018-11-28 | End: 2019-01-18

## 2018-11-28 RX ADMIN — SODIUM CHLORIDE 1000 ML: 0.9 INJECTION, SOLUTION INTRAVENOUS at 06:11

## 2018-11-28 RX ADMIN — METOCLOPRAMIDE 10 MG: 5 INJECTION, SOLUTION INTRAMUSCULAR; INTRAVENOUS at 09:11

## 2018-11-28 RX ADMIN — ONDANSETRON 4 MG: 2 INJECTION INTRAMUSCULAR; INTRAVENOUS at 06:11

## 2018-11-28 RX ADMIN — IOHEXOL 75 ML: 350 INJECTION, SOLUTION INTRAVENOUS at 08:11

## 2018-11-28 RX ADMIN — CHLORDIAZEPOXIDE HYDROCHLORIDE 50 MG: 25 CAPSULE ORAL at 09:11

## 2018-11-28 RX ADMIN — PROCHLORPERAZINE EDISYLATE 10 MG: 5 INJECTION INTRAMUSCULAR; INTRAVENOUS at 06:11

## 2018-11-28 RX ADMIN — LIDOCAINE HYDROCHLORIDE: 20 SOLUTION ORAL; TOPICAL at 09:11

## 2018-11-28 RX ADMIN — KETOROLAC TROMETHAMINE 15 MG: 30 INJECTION, SOLUTION INTRAMUSCULAR at 07:11

## 2018-11-28 RX ADMIN — HYDROCODONE BITARTRATE AND ACETAMINOPHEN 1 TABLET: 10; 325 TABLET ORAL at 09:11

## 2018-11-28 NOTE — ED NOTES
"Pt presents to ED secondary to LUQ abdominal pain, ETOH abuse, and vomiting and nausea. States had been sober for 5 months until 4-5 weeks ago began to drink again after going to a party. States last drink was "last night" and drank a "pint of liquor". Pt appears shaky and slightly tremulous. States had two episodes of vomiting this morning and denies blood in vomit.   "

## 2018-11-28 NOTE — ED PROVIDER NOTES
Encounter Date: 11/28/2018    SCRIBE #1 NOTE: I, Omari Ramos, am scribing for, and in the presence of,  Dr. Leon. I have scribed the entire note.       History     Chief Complaint   Patient presents with    Abdominal Pain     32 y/o M presents to the ED c/o left sided abdominal pain. Patient reports onset 3 days ago. Describes an aching left sided abdominal pain that was initially intermittent but has been constant for the last day. It fluctuates in intensity without exacerbating or alleviating factors. Patient reports the pain is similar to his prior alcoholic pancreatitis episodes. Admits to relapse of his drinking alcohol recently. Reports the pain is accompanied by nausea and a few episodes on nonbloody, nonbilious emesis yesterday. Denies constipation or diarrhea. Admits to a nonproductive, dry cough that began yesterday, denies any CP or SOB or fever. No other symptoms reported at this time.       The history is provided by the patient.     Review of patient's allergies indicates:  No Known Allergies  Past Medical History:   Diagnosis Date    Seizures      Past Surgical History:   Procedure Laterality Date    APPENDECTOMY       Family History   Problem Relation Age of Onset    No Known Problems Mother     No Known Problems Father      Social History     Tobacco Use    Smoking status: Current Every Day Smoker     Packs/day: 0.33     Years: 5.00     Pack years: 1.65     Types: Cigarettes     Start date: 3/10/2012    Smokeless tobacco: Never Used   Substance Use Topics    Alcohol use: No     Comment: quit on 03/09/2018    Drug use: No     Review of Systems   Constitutional: Negative for chills, fatigue and fever.   HENT: Negative for congestion, facial swelling, trouble swallowing and voice change.    Eyes: Negative for photophobia, pain and redness.   Respiratory: Positive for cough. Negative for choking and shortness of breath.    Cardiovascular: Negative for chest pain, palpitations and leg  swelling.   Gastrointestinal: Positive for abdominal pain, nausea and vomiting. Negative for diarrhea.   Genitourinary: Negative for dysuria, frequency and urgency.   Musculoskeletal: Negative for back pain, neck pain and neck stiffness.   Neurological: Negative for seizures, speech difficulty, light-headedness, numbness and headaches.   All other systems reviewed and are negative.      Physical Exam     Initial Vitals [11/28/18 0606]   BP Pulse Resp Temp SpO2   123/73 88 18 97.9 °F (36.6 °C) 100 %      MAP       --         Physical Exam    Nursing note and vitals reviewed.  Constitutional: He appears well-developed and well-nourished. He appears distressed (Mild discomfort).   HENT:   Head: Normocephalic and atraumatic.   Dry mucous membranes; Oropharynx clear   Eyes: Conjunctivae and EOM are normal. Pupils are equal, round, and reactive to light.   Neck: Normal range of motion. Neck supple. No tracheal deviation present.   Cardiovascular: Normal rate, regular rhythm, normal heart sounds and intact distal pulses.   Pulmonary/Chest: Breath sounds normal. No respiratory distress. He has no wheezes. He has no rhonchi. He has no rales.   Abdominal: Soft. Bowel sounds are normal. He exhibits no distension. There is tenderness (Left sided). There is no rebound.   Musculoskeletal: Normal range of motion. He exhibits no edema or tenderness.   Neurological: He is alert and oriented to person, place, and time. He has normal strength. No cranial nerve deficit or sensory deficit.   Skin: Skin is warm and dry. Capillary refill takes less than 2 seconds.         ED Course   Procedures  Labs Reviewed   CBC W/ AUTO DIFFERENTIAL - Abnormal; Notable for the following components:       Result Value    RBC 4.32 (*)     MCH 33.6 (*)     Platelets 106 (*)     Lymph # 0.9 (*)     Lymph% 16.1 (*)     All other components within normal limits   COMPREHENSIVE METABOLIC PANEL - Abnormal; Notable for the following components:    Sodium 134  (*)     Chloride 93 (*)     CO2 19 (*)     Calcium 10.6 (*)     Total Protein 8.8 (*)     Total Bilirubin 2.9 (*)      (*)      (*)     Anion Gap 22 (*)     All other components within normal limits   URINALYSIS - Abnormal; Notable for the following components:    Color, UA Orange (*)     Protein, UA 1+ (*)     Ketones, UA 2+ (*)     Bilirubin (UA) 2+ (*)     Nitrite, UA Positive (*)     All other components within normal limits   ALCOHOL,MEDICAL (ETHANOL) - Abnormal; Notable for the following components:    Alcohol, Medical, Serum 32 (*)     All other components within normal limits   LIPASE - Abnormal; Notable for the following components:    Lipase >1000 (*)     All other components within normal limits   CULTURE, URINE   DRUG SCREEN PANEL, URINE EMERGENCY   CK   URINALYSIS MICROSCOPIC            X-Rays:   Independently Interpreted Readings:   Other Readings:  Imaging interpreted by radiologist and visualized by me    Imaging Results          CT Abdomen Pelvis With Contrast (Final result)  Result time 11/28/18 09:13:12    Final result by Ricky Anderson MD (11/28/18 09:13:12)                 Impression:      Stranding of the peripancreatic fat suggestive of acute pancreatitis.    Hepatomegaly with fatty changes of the liver.    Stable appearance of the bilateral subcentimeter pulmonary nodules when compared to the prior examination.  These are likely benign, however follow-up CT examination of the thorax in 12-18 months is recommended to confirm stability.    Probable subcentimeter cyst within the mid right kidney.      Electronically signed by: Ricky Anderson MD  Date:    11/28/2018  Time:    09:13             Narrative:    EXAMINATION:  CT ABDOMEN PELVIS WITH CONTRAST    CLINICAL HISTORY:  Nausea, vomiting, diarrhea;    TECHNIQUE:  Low dose axial images, sagittal and coronal reformations were obtained from the lung bases to the pubic symphysis following the IV administration of 75 mL of Omnipaque  350 .  Oral contrast was not given.    COMPARISON:  03/10/2018.    FINDINGS:  Once again, there is a 3 mm pulmonary nodule identified within the left lower lobe (image 3, series 2) and a 7 mm nodule within the right lower lobe (image 15, series 2), stable when compared to the prior examination.  No new pulmonary nodules are identified.  The lung bases are otherwise clear.  Bony structures appear intact without evidence for acute fracture or bone destruction.    The liver is enlarged and diffusely decreased in density consistent with fatty changes of the liver.  No focal abnormalities of the liver are identified.  The gallbladder is present and appears grossly unremarkable.  There is no evidence for intrahepatic or extrahepatic biliary dilatation.  The spleen and stomach appear grossly unremarkable.  There is stranding of the peripancreatic fat and findings are suggestive of acute pancreatitis.  No focal pancreatic masses are identified.  The adrenal glands are not enlarged.  The kidneys are normal in size, position, and contour and are noted to concentrate and excrete contrast symmetrically.  Once again, a subcentimeter hypodense lesion is identified within the mid right kidney, likely a subcentimeter cyst.  There is no evidence for hydronephrosis.  The abdominal aorta tapers normally without aneurysmal dilatation.  The appendix is not clearly delineated.  There are no dilated loops of bowel evident.  The urinary bladder appears grossly unremarkable.  No ascites is identified.  There is no evidence for pelvic or inguinal lymphadenopathy.                               X-Ray Chest PA And Lateral (Final result)  Result time 11/28/18 07:09:22    Final result by Golden Pettit MD (11/28/18 07:09:22)                 Impression:      No radiographic evidence of acute intrathoracic process.      Electronically signed by: Golden Pettit MD  Date:    11/28/2018  Time:    07:09             Narrative:    EXAMINATION:  XR  CHEST PA AND LATERAL    CLINICAL HISTORY:  Cough    TECHNIQUE:  PA and lateral views of the chest were performed.    COMPARISON:  03/10/2018    FINDINGS:  The cardiomediastinal silhouette appears within normal limits.  The lungs are symmetrically aerated without evidence of focal airspace consolidation.  There is no pleural effusion or pneumothorax.  Visualized osseous structures appear intact.                               X-Ray Abdomen Flat And Erect (Final result)  Result time 11/28/18 07:12:18    Final result by Golden Pettit MD (11/28/18 07:12:18)                 Impression:      Nonobstructive bowel gas pattern.    Hepatomegaly.      Electronically signed by: Golden Pettit MD  Date:    11/28/2018  Time:    07:12             Narrative:    EXAMINATION:  XR ABDOMEN FLAT AND ERECT    CLINICAL HISTORY:  Unspecified abdominal pain    TECHNIQUE:  Flat and erect AP views of the abdomen were performed.    COMPARISON:  CT 03/10/2018    FINDINGS:  Scattered air is seen in nondilated loops of small and large bowel. No central small bowel air fluid levels are appreciated.  No definite evidence of free intraperitoneal air.  There is scattered retained stool noted throughout the colon.  The liver is enlarged.  Visualized osseous structures appear intact.  The visualized lung bases appear clear.                                Medical Decision Making:   Initial Assessment:   Humberto Liz is a 33 y.o. male who presents to the ED due to abdominal pain.  Differential Diagnosis:   AAA, aortic dissection, mesenteric ischemia, perforated viscous, MI/ACS, SBO/volvulus, incarcerated/strangulated hernia, intussusception, ileus, appendicitis, cholecystitis, cholangitis, diverticulitis, esophagitis, hepatitis, nephrolithiasis, pancreatitis, gastroenteritis, colitis, IBD/IBS, biliary colic, GERD, PUD, constipation, UTI/pyelonephritis,  disorder.    Independently Interpreted Test(s):   I have ordered and independently interpreted  X-rays - see prior notes.  Clinical Tests:   Lab Tests: Reviewed       <> Summary of Lab: Consistent with alcoholic pancreatitis, similar to his initial episode  ED Management:  Patient given IV fluid, Compazine, Zofran, Toradol, GI cocktail, Norco.  Also given Librium.  He is currently tolerating p.o. and feeling somewhat better.  His vital signs are stable.  Informed him of results as well as plan to discharge with prescriptions for Zofran, Norco, Librium, follow up with primary care physician, reasons to return to the emergency room.  I did offer observation, patient reports feeling better and is comfortable with discharge rather than observation at this time.                      Clinical Impression:     1. Alcohol-induced acute pancreatitis without infection or necrosis    2. Abdominal pain    3. Cough    4. Left upper quadrant pain    5. Non-intractable vomiting with nausea, unspecified vomiting type            Disposition:   Disposition: Discharged  Condition: Stable       Scribe Attestation I, Dr. Ahsan Leon, personally performed the services described in this documentation. All medical record entries made by the scribe were at my direction and in my presence.  I have reviewed the chart and agree that the record reflects my personal performance and is accurate and complete. Ahsan Leon MD.  9:31 AM 11/28/2018                     Ahsan Leon MD  11/28/18 0986

## 2019-01-18 ENCOUNTER — HOSPITAL ENCOUNTER (INPATIENT)
Facility: HOSPITAL | Age: 34
LOS: 2 days | Discharge: HOME | DRG: 440 | End: 2019-01-20
Attending: EMERGENCY MEDICINE | Admitting: INTERNAL MEDICINE

## 2019-01-18 DIAGNOSIS — K70.10 ACUTE ALCOHOLIC HEPATITIS: Primary | ICD-10-CM

## 2019-01-18 DIAGNOSIS — K85.20 ALCOHOL-INDUCED ACUTE PANCREATITIS, UNSPECIFIED COMPLICATION STATUS: ICD-10-CM

## 2019-01-18 DIAGNOSIS — K85.20 ALCOHOL-INDUCED ACUTE PANCREATITIS WITHOUT INFECTION OR NECROSIS: ICD-10-CM

## 2019-01-18 LAB
ALBUMIN SERPL BCP-MCNC: 4.6 G/DL
ALP SERPL-CCNC: 101 U/L
ALT SERPL W/O P-5'-P-CCNC: 216 U/L
AMM URATE CRY UR QL COMP ASSIST: ABNORMAL
ANION GAP SERPL CALC-SCNC: 23 MMOL/L
AST SERPL-CCNC: 421 U/L
BACTERIA #/AREA URNS AUTO: ABNORMAL /HPF
BASOPHILS # BLD AUTO: 0.06 K/UL
BASOPHILS NFR BLD: 1 %
BILIRUB SERPL-MCNC: 2.7 MG/DL
BILIRUB UR QL STRIP: ABNORMAL
BUN SERPL-MCNC: 9 MG/DL
CALCIUM SERPL-MCNC: 9.8 MG/DL
CHLORIDE SERPL-SCNC: 92 MMOL/L
CLARITY UR REFRACT.AUTO: ABNORMAL
CO2 SERPL-SCNC: 20 MMOL/L
COLOR UR AUTO: ABNORMAL
CREAT SERPL-MCNC: 1 MG/DL
DIFFERENTIAL METHOD: ABNORMAL
EOSINOPHIL # BLD AUTO: 0.1 K/UL
EOSINOPHIL NFR BLD: 1.5 %
ERYTHROCYTE [DISTWIDTH] IN BLOOD BY AUTOMATED COUNT: 11.4 %
EST. GFR  (AFRICAN AMERICAN): >60 ML/MIN/1.73 M^2
EST. GFR  (NON AFRICAN AMERICAN): >60 ML/MIN/1.73 M^2
GLUCOSE SERPL-MCNC: 126 MG/DL
GLUCOSE UR QL STRIP: NEGATIVE
HCT VFR BLD AUTO: 42 %
HGB BLD-MCNC: 14.3 G/DL
HGB UR QL STRIP: NEGATIVE
HYALINE CASTS UR QL AUTO: 0 /LPF
IMM GRANULOCYTES # BLD AUTO: 0.02 K/UL
IMM GRANULOCYTES NFR BLD AUTO: 0.3 %
KETONES UR QL STRIP: ABNORMAL
LEUKOCYTE ESTERASE UR QL STRIP: NEGATIVE
LIPASE SERPL-CCNC: 445 U/L
LYMPHOCYTES # BLD AUTO: 0.8 K/UL
LYMPHOCYTES NFR BLD: 13.9 %
MCH RBC QN AUTO: 32.4 PG
MCHC RBC AUTO-ENTMCNC: 34 G/DL
MCV RBC AUTO: 95 FL
MICROSCOPIC COMMENT: ABNORMAL
MONOCYTES # BLD AUTO: 0.8 K/UL
MONOCYTES NFR BLD: 12.7 %
NEUTROPHILS # BLD AUTO: 4.2 K/UL
NEUTROPHILS NFR BLD: 70.6 %
NITRITE UR QL STRIP: NEGATIVE
NRBC BLD-RTO: 0 /100 WBC
PH UR STRIP: 6 [PH] (ref 5–8)
PLATELET # BLD AUTO: 131 K/UL
PMV BLD AUTO: 10 FL
POTASSIUM SERPL-SCNC: 3.7 MMOL/L
PROT SERPL-MCNC: 8.8 G/DL
PROT UR QL STRIP: ABNORMAL
RBC # BLD AUTO: 4.41 M/UL
RBC #/AREA URNS AUTO: 1 /HPF (ref 0–4)
SODIUM SERPL-SCNC: 135 MMOL/L
SP GR UR STRIP: 1.03 (ref 1–1.03)
URN SPEC COLLECT METH UR: ABNORMAL
WBC # BLD AUTO: 5.97 K/UL
WBC #/AREA URNS AUTO: 1 /HPF (ref 0–5)

## 2019-01-18 PROCEDURE — 96361 HYDRATE IV INFUSION ADD-ON: CPT

## 2019-01-18 PROCEDURE — 99232 SBSQ HOSP IP/OBS MODERATE 35: CPT | Mod: ,,, | Performed by: INTERNAL MEDICINE

## 2019-01-18 PROCEDURE — 81001 URINALYSIS AUTO W/SCOPE: CPT

## 2019-01-18 PROCEDURE — 25000003 PHARM REV CODE 250: Performed by: INTERNAL MEDICINE

## 2019-01-18 PROCEDURE — 99285 EMERGENCY DEPT VISIT HI MDM: CPT | Mod: ,,, | Performed by: EMERGENCY MEDICINE

## 2019-01-18 PROCEDURE — A4216 STERILE WATER/SALINE, 10 ML: HCPCS | Performed by: EMERGENCY MEDICINE

## 2019-01-18 PROCEDURE — 99232 PR SUBSEQUENT HOSPITAL CARE,LEVL II: ICD-10-PCS | Mod: ,,, | Performed by: INTERNAL MEDICINE

## 2019-01-18 PROCEDURE — 25000003 PHARM REV CODE 250: Performed by: EMERGENCY MEDICINE

## 2019-01-18 PROCEDURE — 96374 THER/PROPH/DIAG INJ IV PUSH: CPT

## 2019-01-18 PROCEDURE — 63600175 PHARM REV CODE 636 W HCPCS: Performed by: INTERNAL MEDICINE

## 2019-01-18 PROCEDURE — 96375 TX/PRO/DX INJ NEW DRUG ADDON: CPT

## 2019-01-18 PROCEDURE — 83690 ASSAY OF LIPASE: CPT

## 2019-01-18 PROCEDURE — 85025 COMPLETE CBC W/AUTO DIFF WBC: CPT

## 2019-01-18 PROCEDURE — 99285 EMERGENCY DEPT VISIT HI MDM: CPT | Mod: 25

## 2019-01-18 PROCEDURE — 99285 PR EMERGENCY DEPT VISIT,LEVEL V: ICD-10-PCS | Mod: ,,, | Performed by: EMERGENCY MEDICINE

## 2019-01-18 PROCEDURE — 63600175 PHARM REV CODE 636 W HCPCS: Performed by: EMERGENCY MEDICINE

## 2019-01-18 PROCEDURE — 96376 TX/PRO/DX INJ SAME DRUG ADON: CPT

## 2019-01-18 PROCEDURE — 80053 COMPREHEN METABOLIC PANEL: CPT

## 2019-01-18 PROCEDURE — 11000001 HC ACUTE MED/SURG PRIVATE ROOM

## 2019-01-18 RX ORDER — DEXTROSE, SODIUM CHLORIDE, SODIUM LACTATE, POTASSIUM CHLORIDE, AND CALCIUM CHLORIDE 5; .6; .31; .03; .02 G/100ML; G/100ML; G/100ML; G/100ML; G/100ML
INJECTION, SOLUTION INTRAVENOUS CONTINUOUS
Status: DISCONTINUED | OUTPATIENT
Start: 2019-01-18 | End: 2019-01-20 | Stop reason: HOSPADM

## 2019-01-18 RX ORDER — HYDROMORPHONE HYDROCHLORIDE 1 MG/ML
1 INJECTION, SOLUTION INTRAMUSCULAR; INTRAVENOUS; SUBCUTANEOUS EVERY 6 HOURS PRN
Status: DISCONTINUED | OUTPATIENT
Start: 2019-01-18 | End: 2019-01-20 | Stop reason: HOSPADM

## 2019-01-18 RX ORDER — DIAZEPAM 5 MG/1
5 TABLET ORAL EVERY 8 HOURS
Status: DISCONTINUED | OUTPATIENT
Start: 2019-01-18 | End: 2019-01-18

## 2019-01-18 RX ORDER — DIAZEPAM 5 MG/1
5 TABLET ORAL EVERY 8 HOURS
Status: DISCONTINUED | OUTPATIENT
Start: 2019-01-18 | End: 2019-01-19

## 2019-01-18 RX ORDER — SODIUM CHLORIDE 0.9 % (FLUSH) 0.9 %
5 SYRINGE (ML) INJECTION
Status: DISCONTINUED | OUTPATIENT
Start: 2019-01-18 | End: 2019-01-20 | Stop reason: HOSPADM

## 2019-01-18 RX ORDER — LORAZEPAM 2 MG/ML
2 INJECTION INTRAMUSCULAR
Status: COMPLETED | OUTPATIENT
Start: 2019-01-18 | End: 2019-01-18

## 2019-01-18 RX ORDER — MORPHINE SULFATE 2 MG/ML
4 INJECTION, SOLUTION INTRAMUSCULAR; INTRAVENOUS
Status: COMPLETED | OUTPATIENT
Start: 2019-01-18 | End: 2019-01-18

## 2019-01-18 RX ORDER — METOCLOPRAMIDE HYDROCHLORIDE 5 MG/ML
10 INJECTION INTRAMUSCULAR; INTRAVENOUS EVERY 6 HOURS PRN
Status: DISCONTINUED | OUTPATIENT
Start: 2019-01-18 | End: 2019-01-20 | Stop reason: HOSPADM

## 2019-01-18 RX ORDER — ONDANSETRON 2 MG/ML
4 INJECTION INTRAMUSCULAR; INTRAVENOUS
Status: COMPLETED | OUTPATIENT
Start: 2019-01-18 | End: 2019-01-18

## 2019-01-18 RX ORDER — THIAMINE HCL 100 MG
100 TABLET ORAL DAILY
Status: DISCONTINUED | OUTPATIENT
Start: 2019-01-19 | End: 2019-01-20 | Stop reason: HOSPADM

## 2019-01-18 RX ORDER — SODIUM CHLORIDE 9 MG/ML
INJECTION, SOLUTION INTRAVENOUS CONTINUOUS
Status: DISCONTINUED | OUTPATIENT
Start: 2019-01-18 | End: 2019-01-18

## 2019-01-18 RX ORDER — ONDANSETRON 2 MG/ML
8 INJECTION INTRAMUSCULAR; INTRAVENOUS EVERY 8 HOURS PRN
Status: DISCONTINUED | OUTPATIENT
Start: 2019-01-18 | End: 2019-01-20 | Stop reason: HOSPADM

## 2019-01-18 RX ORDER — LORAZEPAM 2 MG/ML
1 INJECTION INTRAMUSCULAR EVERY 30 MIN PRN
Status: DISCONTINUED | OUTPATIENT
Start: 2019-01-18 | End: 2019-01-20 | Stop reason: HOSPADM

## 2019-01-18 RX ORDER — KETOROLAC TROMETHAMINE 30 MG/ML
30 INJECTION, SOLUTION INTRAMUSCULAR; INTRAVENOUS EVERY 6 HOURS PRN
Status: DISCONTINUED | OUTPATIENT
Start: 2019-01-18 | End: 2019-01-20 | Stop reason: HOSPADM

## 2019-01-18 RX ADMIN — LORAZEPAM 2 MG: 2 INJECTION, SOLUTION INTRAMUSCULAR; INTRAVENOUS at 02:01

## 2019-01-18 RX ADMIN — DEXTROSE, SODIUM CHLORIDE, SODIUM LACTATE, POTASSIUM CHLORIDE, AND CALCIUM CHLORIDE: 5; .6; .31; .03; .02 INJECTION, SOLUTION INTRAVENOUS at 10:01

## 2019-01-18 RX ADMIN — Medication 4 MG: at 02:01

## 2019-01-18 RX ADMIN — ONDANSETRON 4 MG: 2 INJECTION INTRAMUSCULAR; INTRAVENOUS at 10:01

## 2019-01-18 RX ADMIN — HYDROMORPHONE HYDROCHLORIDE 1 MG: 1 INJECTION, SOLUTION INTRAMUSCULAR; INTRAVENOUS; SUBCUTANEOUS at 06:01

## 2019-01-18 RX ADMIN — DIAZEPAM 5 MG: 5 TABLET ORAL at 04:01

## 2019-01-18 RX ADMIN — Medication 5 ML: at 02:01

## 2019-01-18 RX ADMIN — DIAZEPAM 5 MG: 5 TABLET ORAL at 10:01

## 2019-01-18 RX ADMIN — SODIUM CHLORIDE 1000 ML: 0.9 INJECTION, SOLUTION INTRAVENOUS at 10:01

## 2019-01-18 RX ADMIN — SODIUM CHLORIDE 1000 ML: 0.9 INJECTION, SOLUTION INTRAVENOUS at 12:01

## 2019-01-18 RX ADMIN — Medication 4 MG: at 12:01

## 2019-01-18 RX ADMIN — THIAMINE HYDROCHLORIDE: 100 INJECTION, SOLUTION INTRAMUSCULAR; INTRAVENOUS at 11:01

## 2019-01-18 RX ADMIN — SODIUM CHLORIDE: 0.9 INJECTION, SOLUTION INTRAVENOUS at 04:01

## 2019-01-18 NOTE — ED PROVIDER NOTES
Encounter Date: 1/18/2019    SCRIBE #1 NOTE: I, Everardo Jose, am scribing for, and in the presence of,  Naomi Tejada MD. I have scribed the entire note.       History     Chief Complaint   Patient presents with    Abdominal Pain     vomiting 'green     33 y.o. male patient with PMHx of alcohol abuse, seizure who presents to the Emergency Department for diffuse abd pain with onset 2 days ago. Pain is rated at 10/10 in severity. Pt reports he drinks approx 2 shots of Juan Leyva per day and last drank alcohol yesterday. Associated sxs include nausea, vomiting (x10), and chills. Pt denies any fever, diarrhea, constipation, hematochezia, dysuria, hematuria, urinary frequency, difficulty urinating, urine decreased, and all other sxs at this time. No further complaints or concerns at this time.      The history is provided by the patient.     Review of patient's allergies indicates:  No Known Allergies  Past Medical History:   Diagnosis Date    Seizures      Past Surgical History:   Procedure Laterality Date    APPENDECTOMY       Family History   Problem Relation Age of Onset    No Known Problems Mother     No Known Problems Father      Social History     Tobacco Use    Smoking status: Current Every Day Smoker     Packs/day: 0.33     Years: 5.00     Pack years: 1.65     Types: Cigarettes     Start date: 3/10/2012    Smokeless tobacco: Never Used   Substance Use Topics    Alcohol use: Yes     Comment: / started again 1 pint per day    Drug use: No     Review of Systems   Constitutional: Positive for chills. Negative for fever.   HENT: Negative for sore throat.    Respiratory: Negative for shortness of breath.    Cardiovascular: Negative for chest pain.   Gastrointestinal: Positive for abdominal pain (diffuse), nausea and vomiting (x10). Negative for blood in stool, constipation and diarrhea.   Genitourinary: Negative for decreased urine volume, difficulty urinating, dysuria, frequency and hematuria.    Musculoskeletal: Negative for back pain.   Skin: Negative for rash.   Neurological: Negative for weakness.   Hematological: Does not bruise/bleed easily.       Physical Exam     Initial Vitals [01/18/19 1025]   BP Pulse Resp Temp SpO2   119/83 99 18 97.9 °F (36.6 °C) 100 %      MAP       --         Physical Exam    Nursing note and vitals reviewed.  Constitutional: He appears well-developed and well-nourished. He is not diaphoretic. No distress.   HENT:   Head: Normocephalic and atraumatic.   Dry mucous membranes. Mild posterior oropharyngeal erythema.   Eyes: Scleral icterus (mild) is present.   Neck: Normal range of motion. Neck supple. No JVD present.   Cardiovascular: Normal rate, regular rhythm, normal heart sounds and intact distal pulses.   Pulmonary/Chest: Breath sounds normal. No respiratory distress. He has no wheezes. He has no rhonchi. He has no rales.   Abdominal: Soft. Bowel sounds are normal. He exhibits no distension. There is hepatomegaly (mild). There is tenderness in the epigastric area and periumbilical area. There is rebound. There is no guarding.   Musculoskeletal: Normal range of motion. He exhibits no edema.   Lymphadenopathy:     He has no cervical adenopathy.   Neurological: He is alert and oriented to person, place, and time. He has normal strength. No cranial nerve deficit or sensory deficit.   No tremor or asterixis.   Skin: Skin is warm and dry.         ED Course   Procedures  Labs Reviewed   CBC W/ AUTO DIFFERENTIAL - Abnormal; Notable for the following components:       Result Value    RBC 4.41 (*)     MCH 32.4 (*)     RDW 11.4 (*)     Platelets 131 (*)     Lymph # 0.8 (*)     Lymph% 13.9 (*)     All other components within normal limits   COMPREHENSIVE METABOLIC PANEL - Abnormal; Notable for the following components:    Sodium 135 (*)     Chloride 92 (*)     CO2 20 (*)     Glucose 126 (*)     Total Protein 8.8 (*)     Total Bilirubin 2.7 (*)      (*)      (*)      Anion Gap 23 (*)     All other components within normal limits   LIPASE - Abnormal; Notable for the following components:    Lipase 445 (*)     All other components within normal limits   URINALYSIS, REFLEX TO URINE CULTURE - Abnormal; Notable for the following components:    Appearance, UA Cloudy (*)     Protein, UA 2+ (*)     Ketones, UA 3+ (*)     Bilirubin (UA) 1+ (*)     All other components within normal limits    Narrative:     Preferred Collection Type->Urine, Clean Catch  YELLOW AND GRAY TOP   URINALYSIS MICROSCOPIC - Abnormal; Notable for the following components:    Ammonium Urate Letty, UA Many (*)     All other components within normal limits    Narrative:     Preferred Collection Type->Urine, Clean Catch  YELLOW AND GRAY TOP          Imaging Results    None          Medical Decision Making:   History:   Old Medical Records: I decided to obtain old medical records.  Old Records Summarized: records from previous admission(s).       <> Summary of Records: Patient has been seen twice in the emergency department for similar complaint.  Initial Assessment:   Emergent evaluation of a 33-year-old male history alcohol abuse presenting with abdominal pain, nausea and vomiting.  Differential Diagnosis:   Acute pancreatitis, acute gastritis, hepatitis, electrolyte derangement, dehydration  Clinical Tests:   Lab Tests: Ordered and Reviewed  ED Management:  - labs  - IV fluids  - anti emetic  - pain control    Patient re-evaluated, continues to report pain in the epigastric area.  Patient was treated with morphine 4 mg IV.  IV fluids continuing to infuse.    Labs reviewed significant for elevated lipase to 445 and LFT abnormality that is consistent with acute alcoholic hepatitis.    Patient re-evaluated, continues to complain of pain.  Additional medication given.  He was able tolerate p.o. with no further nausea and vomiting.  Will admit for pain control and further evaluation.  Because patient is a heavy drinker,  will order Ativan ( valium on backorder) for DT precautions              Attending Attestation:           Physician Attestation for Scribe:      Comments: I, Dr. Naomi Tejada, personally performed the services described in this documentation. All medical record entries made by the scribe were at my direction and in my presence.  I have reviewed the chart and agree that the record reflects my personal performance and is accurate and complete. Naomi Tejada MD.                 Clinical Impression:   The primary encounter diagnosis was Acute alcoholic hepatitis. A diagnosis of Alcohol-induced acute pancreatitis, unspecified complication status was also pertinent to this visit.      Disposition:   Disposition: Admitted  Condition: Fair                        Naomi Tejada MD  01/18/19 1520

## 2019-01-18 NOTE — MEDICAL/APP STUDENT
Hospital Medicine  History and Physical Exam    Team: [facility] MED D Mariana Shin MD  Admit Date: 1/18/2019  ABEL   Principal Problem:  <principal problem not specified>   Patient information was obtained from patient and ER records.   Primary care Physician: Primary Doctor No  Code status: Full Code    HPI:   Pt is a 33 year old male with a history of alcoholic pancreatitis and EtOH use disorder who presents with abdominal pain. Started 3 days ago. Associated with nausea, bilious non-bloody vomiting, decreased appetite, and chills. Unable to keep down fluids or food. Pt is currently hungry. No fever. Has had bowel movements. Pt has had similar episodes in the past after drinking EtOH. Pt drinks 2 shots of whiskey daily. Has attempted to quit EtOH in the past, with a 3 month period of abstinence.    Past Medical History: Patient has a past medical history of Seizures.    Past Surgical History: Patient has a past surgical history that includes Appendectomy.    Social History: Patient reports that he has been smoking cigarettes.  He started smoking about 6 years ago. He has a 1.65 pack-year smoking history. he has never used smokeless tobacco. He reports that he drinks alcohol. He reports that he does not use drugs.    Family History: family history includes No Known Problems in his father and mother.    Medications:   Prior to Admission medications    Medication Sig Start Date End Date Taking? Authorizing Provider   ibuprofen (ADVIL,MOTRIN) 200 MG tablet Take 200 mg by mouth every 6 (six) hours as needed for Pain.   Yes Historical Provider, MD   chlordiazepoxide (LIBRIUM) 25 MG Cap Take 1 capsule (25 mg total) by mouth 4 (four) times daily as needed (Withdrawal symptoms). 11/28/18 1/18/19  Ahsan Leon MD   HYDROcodone-acetaminophen (NORCO) 7.5-325 mg per tablet Take 1 tablet by mouth every 6 (six) hours as needed for Pain. 11/28/18 1/18/19  Ahsan Leon MD   ondansetron (ZOFRAN) 4 MG tablet Take 1  tablet (4 mg total) by mouth every 6 (six) hours. 9/23/18 1/18/19  Italo Ricardo PA-C   ondansetron (ZOFRAN) 4 MG tablet Take 1 tablet (4 mg total) by mouth every 6 (six) hours as needed. 11/28/18 1/18/19  Ahsan Leon MD       Allergies: Patient has No Known Allergies.    ROS  Constitutional: SEE HPI  Respiratory: no cough or shortness of breath  Cardiovascular: no chest pain or palpitations  Gastrointestinal: SEE HPI  Genitourinary: dark urine  Integument/Breast: no rash or pruritis  Hematologic/Lymphatic: no easy bruising or lymphadenopathy  Musculoskeletal: no arthralgias or myalgias  Neurological: no seizures or tremors  Behavioral/Psych: no depression or anxiety    PEx  Temp:  [97.8 °F (36.6 °C)-98.1 °F (36.7 °C)]   Pulse:  [77-99]   Resp:  [18]   BP: (106-127)/(76-83)   SpO2:  [100 %]   Body mass index is 18.88 kg/m².     General appearance: no distress  Mental status: Alert and oriented x 3  HEENT:  conjunctivae/corneas clear, PERRL, tongue non-jaundiced, dry mucous membranes  Neck: supple, thyroid not enlarged  Pulm:   normal respiratory effort, CTA B, no c/w/r  Card: RRR, S1, S2 normal, no murmur, click, rub or gallop, hyperdynamic heart sounds  Abd: epigastric tenderness, decreased bowel sounds  Ext: no c/c/e  Pulses: 2+, symmetric  Skin: color, texture, turgor normal. No rashes or lesions  Neuro: CN II-XII grossly intact, no focal numbness or weakness, normal strength and tone     Intake/Output Summary (Last 24 hours) at 1/18/2019 1720  Last data filed at 1/18/2019 1317  Gross per 24 hour   Intake 2000 ml   Output --   Net 2000 ml     Recent Labs   Lab 01/18/19  1034   WBC 5.97   HGB 14.3   HCT 42.0   *     Recent Labs   Lab 01/18/19  1034   *   K 3.7   CL 92*   CO2 20*   BUN 9   CREATININE 1.0   *   CALCIUM 9.8   LIPASE 445*     Recent Labs   Lab 01/18/19  1034   ALKPHOS 101   *   *   ALBUMIN 4.6   PROT 8.8*   BILITOT 2.7*        Active Hospital Problems     Diagnosis  POA    Acute alcoholic hepatitis [K70.10]  Yes      Resolved Hospital Problems   No resolved problems to display.       Overview  Pt is a 33 year old male with a history of alcoholic pancreatitis and EtOH use disorder who is admitted for alcoholic pancreatitis. Amendable to rehab and counseling.    Assessment and Plan for Problems addressed today:    Alcoholic pancreatitis  · Clear liquid diet  · Change NS to LR  · Pain management, zofran    Alcohol use disorder  History of DTs  · Valium 5 mg Q8h  · Provide counseling materials (pt receptive)  · Ativan IV prn CIWA >8 or abdnormal vital sings  · Neuro checks Q4h  · IV thiamin/folate and start folate and folbic tomorrow      DVT PPx:   VTE Risk Mitigation (From admission, onward)        Ordered     IP VTE LOW RISK PATIENT  Once      01/18/19 1438     Place FRANCK hose  Until discontinued      01/18/19 1438          Provider   Mariana Shin MD    I personally scribed for Mariana Shin MD on 01/18/2019 at 4:38 PM. Electronically signed by wild Hays on 01/18/2019 at 4:38 PM

## 2019-01-18 NOTE — ED TRIAGE NOTES
Patient's name and date of birth checked and is correct.    LOC: The patient is awake, alert, and oriented to place, time, situation. Affect is appropriate.  Speech is appropriate and clear.      APPEARANCE: Patient resting comfortably, and is  in no acute distress.  Patient is clean and well groomed.     SKIN: The skin is warm and dry; color consistent with ethnicity.  Patient has normal skin turgor and moist mucus membranes.  Skin intact; no breakdown or bruising noted.      MUSCULOSKELETAL: Patient moving upper and lower extremities without difficulty.  Denies weakness.      RESPIRATORY: Airway is open and patent. Respirations spontaneous, even, easy, and non-labored.  Patient has a normal effort and rate.  No accessory muscle use noted. Denies cough.  BS clear.     CARDIAC:  No peripheral edema noted. No complaints of chest pain.       ABDOMEN: Soft and non tender to palpation.  No distention noted.      NEUROLOGIC: Eyes open spontaneously.  Behavior appropriate to situation.  Follows commands; facial expression symmetrical.  Purposeful motor response noted; normal sensation in all extremities.

## 2019-01-19 LAB
ALBUMIN SERPL BCP-MCNC: 3.1 G/DL
ALP SERPL-CCNC: 69 U/L
ALT SERPL W/O P-5'-P-CCNC: 132 U/L
ANION GAP SERPL CALC-SCNC: 9 MMOL/L
AST SERPL-CCNC: 228 U/L
BILIRUB SERPL-MCNC: 2.2 MG/DL
BUN SERPL-MCNC: 2 MG/DL
CALCIUM SERPL-MCNC: 8.5 MG/DL
CHLORIDE SERPL-SCNC: 98 MMOL/L
CO2 SERPL-SCNC: 26 MMOL/L
CREAT SERPL-MCNC: 0.8 MG/DL
EST. GFR  (AFRICAN AMERICAN): >60 ML/MIN/1.73 M^2
EST. GFR  (NON AFRICAN AMERICAN): >60 ML/MIN/1.73 M^2
GLUCOSE SERPL-MCNC: 208 MG/DL
MAGNESIUM SERPL-MCNC: 1.4 MG/DL
PHOSPHATE SERPL-MCNC: <1 MG/DL
POTASSIUM SERPL-SCNC: 3 MMOL/L
PROT SERPL-MCNC: 6.2 G/DL
SODIUM SERPL-SCNC: 133 MMOL/L

## 2019-01-19 PROCEDURE — 25000003 PHARM REV CODE 250: Performed by: INTERNAL MEDICINE

## 2019-01-19 PROCEDURE — 36415 COLL VENOUS BLD VENIPUNCTURE: CPT

## 2019-01-19 PROCEDURE — 90792 PR PSYCHIATRIC DIAGNOSTIC EVALUATION W/MEDICAL SERVICES: ICD-10-PCS | Mod: ,,, | Performed by: PSYCHIATRY & NEUROLOGY

## 2019-01-19 PROCEDURE — 99232 SBSQ HOSP IP/OBS MODERATE 35: CPT | Mod: ,,, | Performed by: INTERNAL MEDICINE

## 2019-01-19 PROCEDURE — 80053 COMPREHEN METABOLIC PANEL: CPT

## 2019-01-19 PROCEDURE — 99232 PR SUBSEQUENT HOSPITAL CARE,LEVL II: ICD-10-PCS | Mod: ,,, | Performed by: INTERNAL MEDICINE

## 2019-01-19 PROCEDURE — 11000001 HC ACUTE MED/SURG PRIVATE ROOM

## 2019-01-19 PROCEDURE — 63600175 PHARM REV CODE 636 W HCPCS: Performed by: INTERNAL MEDICINE

## 2019-01-19 PROCEDURE — 90792 PSYCH DIAG EVAL W/MED SRVCS: CPT | Mod: ,,, | Performed by: PSYCHIATRY & NEUROLOGY

## 2019-01-19 PROCEDURE — 84100 ASSAY OF PHOSPHORUS: CPT

## 2019-01-19 PROCEDURE — 83735 ASSAY OF MAGNESIUM: CPT

## 2019-01-19 RX ORDER — SODIUM,POTASSIUM PHOSPHATES 280-250MG
2 POWDER IN PACKET (EA) ORAL
Status: DISCONTINUED | OUTPATIENT
Start: 2019-01-19 | End: 2019-01-20 | Stop reason: HOSPADM

## 2019-01-19 RX ORDER — DIAZEPAM 5 MG/1
5 TABLET ORAL 2 TIMES DAILY
Status: DISCONTINUED | OUTPATIENT
Start: 2019-01-19 | End: 2019-01-20

## 2019-01-19 RX ORDER — ACETAMINOPHEN 325 MG/1
650 TABLET ORAL EVERY 6 HOURS PRN
Status: DISCONTINUED | OUTPATIENT
Start: 2019-01-19 | End: 2019-01-20 | Stop reason: HOSPADM

## 2019-01-19 RX ORDER — MAGNESIUM SULFATE HEPTAHYDRATE 40 MG/ML
2 INJECTION, SOLUTION INTRAVENOUS
Status: COMPLETED | OUTPATIENT
Start: 2019-01-19 | End: 2019-01-19

## 2019-01-19 RX ADMIN — MAGNESIUM SULFATE IN WATER 2 G: 40 INJECTION, SOLUTION INTRAVENOUS at 12:01

## 2019-01-19 RX ADMIN — DIAZEPAM 5 MG: 5 TABLET ORAL at 02:01

## 2019-01-19 RX ADMIN — Medication 100 MG: at 10:01

## 2019-01-19 RX ADMIN — Medication 1 TABLET: at 10:01

## 2019-01-19 RX ADMIN — POTASSIUM & SODIUM PHOSPHATES POWDER PACK 280-160-250 MG 2 PACKET: 280-160-250 PACK at 10:01

## 2019-01-19 RX ADMIN — POTASSIUM & SODIUM PHOSPHATES POWDER PACK 280-160-250 MG 2 PACKET: 280-160-250 PACK at 06:01

## 2019-01-19 RX ADMIN — POTASSIUM PHOSPHATE, MONOBASIC AND POTASSIUM PHOSPHATE, DIBASIC 15 MMOL: 224; 236 INJECTION, SOLUTION, CONCENTRATE INTRAVENOUS at 12:01

## 2019-01-19 RX ADMIN — DEXTROSE, SODIUM CHLORIDE, SODIUM LACTATE, POTASSIUM CHLORIDE, AND CALCIUM CHLORIDE: 5; .6; .31; .03; .02 INJECTION, SOLUTION INTRAVENOUS at 04:01

## 2019-01-19 RX ADMIN — MAGNESIUM SULFATE IN WATER 2 G: 40 INJECTION, SOLUTION INTRAVENOUS at 06:01

## 2019-01-19 RX ADMIN — DIAZEPAM 5 MG: 5 TABLET ORAL at 10:01

## 2019-01-19 RX ADMIN — DEXTROSE, SODIUM CHLORIDE, SODIUM LACTATE, POTASSIUM CHLORIDE, AND CALCIUM CHLORIDE: 5; .6; .31; .03; .02 INJECTION, SOLUTION INTRAVENOUS at 10:01

## 2019-01-19 RX ADMIN — ACETAMINOPHEN 650 MG: 325 TABLET ORAL at 07:01

## 2019-01-19 RX ADMIN — HYDROMORPHONE HYDROCHLORIDE 1 MG: 1 INJECTION, SOLUTION INTRAMUSCULAR; INTRAVENOUS; SUBCUTANEOUS at 10:01

## 2019-01-19 RX ADMIN — MAGNESIUM SULFATE IN WATER 2 G: 40 INJECTION, SOLUTION INTRAVENOUS at 02:01

## 2019-01-19 RX ADMIN — DIAZEPAM 5 MG: 5 TABLET ORAL at 05:01

## 2019-01-19 NOTE — CONSULTS
"[facility]  Psychiatry  Consult Note    Patient Name: Humberto Liz  MRN: [id number]   Code Status: Full Code  Admission Date: 1/18/2019  Hospital Length of Stay: 1 days  Attending Physician: Mariana Shin MD  Primary Care Provider: Primary Doctor No    Current Legal Status: N/A    Patient information was obtained from patient, past medical records and ER records.   Consults  Subjective:     Principal Problem:<principal problem not specified>    Chief Complaint:  Alcohol       HPI:   Per Primary MD:  Pt is a 33 year old male with a history of alcoholic pancreatitis and EtOH use disorder who presents with abdominal pain. Started 3 days ago. Associated with nausea, bilious non-bloody vomiting, decreased appetite, and chills. Unable to keep down fluids or food. Pt is currently hungry. No fever. Has had bowel movements. Pt has had similar episodes in the past after drinking EtOH. Pt drinks 2 shots of whiskey daily. Has attempted to quit EtOH in the past, with a 3 month period of abstinence.    Per Psychiatry MD:   Per interview with Dr. Monson:   Humberto Liz is a 33 y.o. male with a past psychiatric history of alcoholic pancreatitis, currently presenting with <principal problem not specified>.  Psychiatry was consulted to address the patient's symptoms of EtOH use.     Pt reports that he was in his usual state of health until about 3 days ago when he developed abdominal pain.  He reports associated chills, denies fevers, headache, chest pain, shortness of breath, constipation, and diarrhea.  He reports several similar episodes over the past few months.  He denies any sick contacts or recent illnesses.  He assumes that his current symptoms are due to alcohol use "because there was really nothing else going on."  He reports that he started drinking at 16yo with friends.  He reports drinking intermittently at first "once or twice a week" for several years.  He reported that he immigrated from his home " [place] of [place] to the [place] about 5 yrs ago and that his drinking habit increased in intensity afterwards.  He reports that he drinks nightly now, "2-4 shots of Juan Leyva."  He reports that he continues to drink because it "helps me sleep."  He reports previous withdrawal symptoms of "shakiness," denies seizures or confusion.  He reports that he has never utilized any form of substance use treatment (AA, outpatient MD, rehab, etc.).  He reports no significant periods of sobriety in the past.  He is not willing to stop drinking at this time but is amenable to discussing resources available to him.    He denies any prior psychiatric diagnoses, has never seen a psychiatrist, and has never taken any psychotropic medications regularly.  He denies any illicit substance use.  He denies any current depressive symptoms, anxiety, manic episodes, h/o hallucinations, current hallucinations, or post-traumatic symptoms.  He is agreeable to speaking to a psychiatrist to continue discussion about his substance use.    On repeat interview with writer, patient corroborates above, plans to seek AA and out-patient care.     SUBJECTIVE   Currently, the patient is endorsing the following:    Medical Review Of Systems:  All systems reviewed and are negative except as above noted in HPI.    Psychiatric Review Of Systems - Is patient experiencing or having changes in:  sleep: no  appetite: no  weight: no  energy/anergy: no  interest/pleasure/anhedonia: no  somatic symptoms: no  guilty/hopelessness: no  concentration: no  S.I.B.s/risky behavior: no  SI/SA:  no    anxiety/panic: no  Agoraphobia:  no  Social phobia:  no  Recurrent nightmares:  no  hyper startle response:  no  Avoidance: no  Recurrent thoughts:  no  Recurrent behaviors:  no    Irritability: no  Racing thoughts: no  Impulsive behaviors: no  Pressured speech:  no    Paranoia:no  Delusions: no  AVH:no    Past Medical/Surgical History:  Past Medical History:   Diagnosis Date "    Seizures       has a past medical history of Seizures.  Past Surgical History:   Procedure Laterality Date    APPENDECTOMY         Past Psychiatric History:  Previous Medication Trials: No  Previous Psychiatric Hospitalizations: No  Previous Suicide Attempts: No  History of Violence: No  Outpatient Psychiatrist: No  Outpatient Psychotherapist: No    Social History:  Marital Status:   Children: 3   Employment Status/Info: currently employed as [employer]  Education: high school diploma/GED  Special Ed: no  Housing/Lives with: wife and three children in apartment  History of phys/sexual abuse: No  Access to gun: No    Substance Abuse History:  Recreational Drugs: denied  Use of Alcohol: moderate  Rehab History:no   Tobacco Use:yes, 2 cigarette daily  Legal consequences of chemical use: no  Is the patient aware of the biomedical complications associated with substance abuse and mental illness? Yes    Legal History:  Past Charges/Incarcerations:no  Pending charges:no     Family Psychiatric History:   Denied    Psychosocial Factors:  Maladaptive or problem behaviors: nightly drinking  Peer group, social, ethic, cultural, emotional, and health factors: lives with wife and children, h/o pancreatitis, current alcoholic hepatitis  Living situation, family constellation, family circumstances/home: living in apartment  Recovery environment: home  Community resources used by patient: none  Treatment acceptance/motivation for change: hesitant to commit to cessation, willing to discuss resources    Hospital Course: See HPI above         Patient History           Medical as of 1/18/2019     Past Medical History     Diagnosis Date Comments Source    Seizures -- -- Provider          Pertinent Negatives     Diagnosis Date Noted Comments Source    Anticoagulant long-term use 03/10/2018 -- Provider    Arthritis 03/10/2018 -- Provider    Asthma 03/10/2018 -- Provider    Cancer 03/10/2018 -- Provider    CHF (congestive heart  failure) 03/10/2018 -- Provider    COPD (chronic obstructive pulmonary disease) 03/10/2018 -- Provider    Coronary artery disease 03/10/2018 -- Provider    Diabetes mellitus 03/10/2018 -- Provider    Encounter for blood transfusion 03/10/2018 -- Provider    Hypertension 03/10/2018 -- Provider    Stroke 03/10/2018 -- Provider    Thyroid disease 03/10/2018 -- Provider    Transfusion reaction 03/10/2018 -- Provider                  Surgical as of 1/18/2019     Past Surgical History     Procedure Laterality Date Comments Source    APPENDECTOMY -- -- -- Provider                  Family as of 1/18/2019     Problem Relation Name Age of Onset Comments Source    No Known Problems Mother -- -- -- Provider    No Known Problems Father -- -- -- Provider            Tobacco Use as of 1/18/2019     Smoking Status Smoking Start Date Smoking Quit Date Packs/Day Years Used    Current Every Day Smoker 3/10/2012 -- 0.33 5.00    Types Comments Smokeless Tobacco Status Smokeless Tobacco Quit Date Source     Cigarettes -- Never Used -- Provider            Alcohol Use as of 1/18/2019     Alcohol Use Drinks/Week Alcohol/Week Comments Source    Yes -- -- / started again 1 pint per day Provider    Frequency Standard Drinks Binge Drinking Source      -- -- -- Provider             Drug Use as of 1/18/2019     Drug Use Types Frequency Comments Source    No -- -- -- Provider            Sexual Activity as of 1/18/2019     Sexually Active Birth Control Partners Comments Source    Not Currently -- -- -- Provider            Activities of Daily Living as of 1/18/2019    None           Social Documentation as of 1/18/2019    None           Occupational as of 1/18/2019    None           Socioeconomic as of 1/18/2019     Marital Status Spouse Name Number of Children Years Education Education Level Preferred Language Ethnicity Race Source     -- -- -- -- Chinese (Other) Central American White --    Financial Resource Strain Food Insecurity: Worry  Food Insecurity: Inability Transportation Needs: Medical Transportation Needs: Non-medical       -- -- -- -- --             Pertinent History     Question Response Comments    Lives with -- --    Place in Birth Order -- --    Lives in -- --    Number of Siblings -- --    Raised by -- --    Legal Involvement -- --    Childhood Trauma -- --    Criminal History of -- --    Financial Status -- --    Highest Level of Education -- --    Does patient have access to a firearm? -- --     Service -- --    Primary Leisure Activity -- --    Spirituality -- --        Past Medical History:   Diagnosis Date    Seizures      Past Surgical History:   Procedure Laterality Date    APPENDECTOMY       Family History     Problem Relation (Age of Onset)    No Known Problems Mother, Father        Tobacco Use    Smoking status: Current Every Day Smoker     Packs/day: 0.33     Years: 5.00     Pack years: 1.65     Types: Cigarettes     Start date: 3/10/2012    Smokeless tobacco: Never Used   Substance and Sexual Activity    Alcohol use: Yes     Comment: / started again 1 pint per day    Drug use: No    Sexual activity: Not Currently     Review of patient's allergies indicates:  No Known Allergies    No current facility-administered medications on file prior to encounter.      Current Outpatient Medications on File Prior to Encounter   Medication Sig    ibuprofen (ADVIL,MOTRIN) 200 MG tablet Take 200 mg by mouth every 6 (six) hours as needed for Pain.    [DISCONTINUED] chlordiazepoxide (LIBRIUM) 25 MG Cap Take 1 capsule (25 mg total) by mouth 4 (four) times daily as needed (Withdrawal symptoms).    [DISCONTINUED] HYDROcodone-acetaminophen (NORCO) 7.5-325 mg per tablet Take 1 tablet by mouth every 6 (six) hours as needed for Pain.    [DISCONTINUED] ondansetron (ZOFRAN) 4 MG tablet Take 1 tablet (4 mg total) by mouth every 6 (six) hours.    [DISCONTINUED] ondansetron (ZOFRAN) 4 MG tablet Take 1 tablet (4 mg total) by mouth  "every 6 (six) hours as needed.     Psychotherapeutics (From admission, onward)    Start     Stop Route Frequency Ordered    01/18/19 2030  lorazepam injection 1 mg      -- IV Every 30 min PRN 01/18/19 1931    01/18/19 1600  diazePAM tablet 5 mg      -- Oral Every 8 hours 01/18/19 1546        Review of Systems  Strengths and Liabilities: Strength: Patient accepts guidance/feedback, Strength: Patient is intelligent., Strength: Patient has positive support network.    Objective:     Vital Signs (Most Recent):  Temp: 98.2 °F (36.8 °C) (01/18/19 1907)  Pulse: 75 (01/18/19 1907)  Resp: 14 (01/18/19 1907)  BP: (!) 132/93 (01/18/19 1907)  SpO2: 97 % (01/18/19 1831) Vital Signs (24h Range):  Temp:  [97.8 °F (36.6 °C)-98.2 °F (36.8 °C)] 98.2 °F (36.8 °C)  Pulse:  [75-99] 75  Resp:  [11-18] 14  SpO2:  [97 %-100 %] 97 %  BP: (106-132)/(76-93) 132/93     Height: 5' 4" (162.6 cm)  Weight: 49.9 kg (110 lb)  Body mass index is 18.88 kg/m².      Intake/Output Summary (Last 24 hours) at 1/18/2019 2024  Last data filed at 1/18/2019 1317  Gross per 24 hour   Intake 2000 ml   Output --   Net 2000 ml       Physical Exam   Psychiatric:   Mental Status Exam:  Appearance: unremarkable, age appropriate, normal weight, lying in bed  Level of Consciousness: awake, alert  Behavior/Cooperation: friendly and cooperative  Psychomotor: unremarkable   Speech: normal tone, normal rate, normal pitch, normal volume  Language: english, fluid  Orientation: person, place, situation, month of year, year  Attention Span/Concentration: recalled days of wk backwards  Memory: Intact  Mood: "ok"  Affect: constricted  Thought Process: linear, goal-directed  Associations: normal and logical  Thought Content: normal, no suicidality, no homicidality, delusions, or paranoia  Fund of Knowledge: Aware of current events  Abstraction: similarities were abstract  Insight: limited  Judgment: good          Significant Labs:   Last 24 Hours:   Recent Lab Results       " 01/18/19  1141   01/18/19  1034        Immature Granulocytes   0.3     Immature Grans (Abs)   0.02  Comment:  Mild elevation in immature granulocytes is non specific and   can be seen in a variety of conditions including stress response,   acute inflammation, trauma and pregnancy. Correlation with other   laboratory and clinical findings is essential.       Albumin   4.6     Alkaline Phosphatase   101     ALT   216     Ammonium Urate Letty, UA Many       Anion Gap   23     Appearance, UA Cloudy       AST   421     Bacteria, UA None       Baso #   0.06     Basophil%   1.0     Bilirubin (UA) 1+  Comment:  Positive urine bilirubin is not confirmed. Correlate with   serum bilirubin and clinical presentation.         Total Bilirubin   2.7  Comment:  For infants and newborns, interpretation of results should be based  on gestational age, weight and in agreement with clinical  observations.  Premature Infant recommended reference ranges:  Up to 24 hours.............<8.0 mg/dL  Up to 48 hours............<12.0 mg/dL  3-5 days..................<15.0 mg/dL  6-29 days.................<15.0 mg/dL       BUN, Bld   9     Calcium   9.8     Chloride   92     CO2   20     Color, UA Nhung       Creatinine   1.0     Differential Method   Automated     eGFR if    >60.0     eGFR if non    >60.0  Comment:  Calculation used to obtain the estimated glomerular filtration  rate (eGFR) is the CKD-EPI equation.        Eos #   0.1     Eosinophil%   1.5     Glucose   126     Glucose, UA Negative       Gran # (ANC)   4.2     Gran%   70.6     Hematocrit   42.0     Hemoglobin   14.3     Hyaline Casts, UA 0       Ketones, UA 3+       Leukocytes, UA Negative       Lipase   445     Lymph #   0.8     Lymph%   13.9     MCH   32.4     MCHC   34.0     MCV   95     Microscopic Comment SEE COMMENT  Comment:  Other formed elements not mentioned in the report are not   present in the microscopic examination.          Mono #   0.8      Mono%   12.7     MPV   10.0     Nitrite, UA Negative       nRBC   0     Occult Blood UA Negative       pH, UA 6.0       Platelets   131     Potassium   3.7     Total Protein   8.8     Protein, UA 2+  Comment:  Recommend a 24 hour urine protein or a urine   protein/creatinine ratio if globulin induced proteinuria is  clinically suspected.         RBC   4.41     RBC, UA 1       RDW   11.4     Sodium   135     Specific Gravity, UA 1.030       Specimen UA Urine, Clean Catch       WBC, UA 1       WBC   5.97           Significant Imaging: I have reviewed all pertinent imaging results/findings within the past 24 hours.    Assessment/Plan:     Alcohol dependence    Pt is a 33yoM w/ h/o alcoholic pancreatitis who is admitted for alcoholic hepatitis.  He reports long history of alcohol consumption, reports escalation of drinking habit over past 2-3 yrs.  He reports medical complications as a result of his drinking.  He denies any h/o complicated withdrawals.  He is not interested in long-term sobriety at this time but is willing to discuss resources available.      Dx:   Alcohol use disorder, mild    Recommendations:   - Discussed outpatient resources options including alcoholics anonymous, outpatient psychiatry follow-up, substance use rehab (inpatient outpatient)--pt willing to review resources if provided, will provide references regarding substance use treatment in the [place] and surrounding areas  - Discussed medication assisted treatment options including disulfuram, naltrexone, acamprosate--pt open to medications though would delay initiation following improvement in acute medical conditions, [facility]; pt does not have insurance and reports this is barrier to establishing care in out-patient setting and unwilling or unable to complete residential rehab due to work obligations, patient plans to attend AA daily  - Ongoing medical workup at this time, current hepatic dysfunction  assumed to be 2/2 alcohol consumption, ongoing management per primary  - Currently receiving valium 5mg q8hrs, to be tapered by primary  - Recommend ativan 2mg PO/IV q4hrs for witnessed seizures, visible tremor or for any two of the following: SBP>160, DBP>100, HR>100  - Agree with thiamine repletion, daily MVI  - Defer non-psychiatry medications to primary team    -Pt does not have insurance or PCP, recommend referral for continuity of care to low cost primary care services       Will sign off, please reconsult with any concerns, thank you for allowing us to participate in the care of this patient.     Total Time:  60 minutes      Carmen Chavez MD   Psychiatry PGY II   [facility]

## 2019-01-19 NOTE — MEDICAL/APP STUDENT
Hospital Medicine  Progress note    Team: [facility] MED D Mariana Shin MD  Admit Date: 1/18/2019  ABEL   Code status: Full Code  Length of Stay: 1 day(s)    Principal Problem:  Alcohol-induced acute pancreatitis    Interval hx:  Feeling better, hungry.    ROS   Respiratory: no cough or shortness of breath  Cardiovascular: no chest pain or palpitations  Gastrointestinal: no nausea or vomiting, no abdominal pain or change in bowel habits  Behavioral/Psych: no depression or anxiety    PEx  Temp:  [98 °F (36.7 °C)-98.3 °F (36.8 °C)]   Pulse:  []   Resp:  [11-16]   BP: (103-134)/(79-95)   SpO2:  [95 %-98 %]     Intake/Output Summary (Last 24 hours) at 1/19/2019 1525  Last data filed at 1/19/2019 0500  Gross per 24 hour   Intake --   Output 400 ml   Net -400 ml     General Appearance: no acute distress   Heart: no longer hyperdynamic heart sounds  Respiratory: Normal respiratory effort, no crackles   Abdomen: abdominal tenderness improved  Skin: intact. IV sites ok  Neurologic:  No focal numbness or weakness  Mental status: Alert, oriented x 4, affect appropriate     Recent Labs   Lab 01/18/19  1034   WBC 5.97   HGB 14.3   HCT 42.0   *     Recent Labs   Lab 01/18/19  1034 01/19/19  0814   * 133*   K 3.7 3.0*   CL 92* 98   CO2 20* 26   BUN 9 2*   CREATININE 1.0 0.8   * 208*   CALCIUM 9.8 8.5*   MG  --  1.4*   PHOS  --  <1.0*   LIPASE 445*  --      Recent Labs   Lab 01/18/19  1034 01/19/19  0814   ALKPHOS 101 69   * 132*   * 228*   ALBUMIN 4.6 3.1*   PROT 8.8* 6.2   BILITOT 2.7* 2.2*      Scheduled Meds:   diazePAM  5 mg Oral BID    folic acid-vit B6-vit B12 2.5-25-2 mg  1 tablet Oral Daily    magnesium sulfate IVPB  2 g Intravenous Q2H    potassium phosphate IVPB  15 mmol Intravenous Once    potassium, sodium phosphates  2 packet Oral QID (AC & HS)    thiamine  100 mg Oral Daily     Continuous Infusions:   dextrose 5% lactated ringers 200 mL/hr at 01/19/19 4136     As Needed:   HYDROmorphone, ketorolac, lorazepam, metoclopramide HCl, ondansetron, sodium chloride 0.9%    Active Hospital Problems    Diagnosis  POA    *Alcohol-induced acute pancreatitis [K85.20]  Yes    Acute alcoholic hepatitis [K70.10]  Yes    Alcohol dependence [F10.20]  Yes      Resolved Hospital Problems   No resolved problems to display.       Overview  Pt is a 33 year old male with a history of alcoholic pancreatitis and EtOH use disorder who is admitted for alcoholic pancreatitis. Amendable to rehab and counseling. Rapid improvement.    Assessment and Plan for Problems addressed today:    Alcoholic pancreatitis  · Clear liquid diet  · Change NS to LR  · Pain management, zofran     Alcohol use disorder  History of DTs  · Valium 5 mg Q8h reduced to BID  · Provide counseling materials (pt receptive)  · Ativan IV prn CIWA >8 or abnormal vital signs  · Neuro checks Q4h  · IV thiamin/folate and folbic (started 1/19)  · Psychiatry consulted, pt refusing medication assistance (disulfram, naltrexone, acamprosate), outpt treatment options discussed (1/19)    Hypophosphatemia  Hypokalemia  Hypomagnesemia  · replacing    Diet:  Diet [place]  DVT PPx:   VTE Risk Mitigation (From admission, onward)        Ordered     IP VTE LOW RISK PATIENT  Once      01/18/19 1438     Place FRANCK hose  Until discontinued      01/18/19 1438        Lines/ Drains/ Airways:  PIV    Discharge plan and follow up  Home    Provider Mariana Shin MD    I personally scribed for Mariana Shin MD on 01/19/2019 at 10:25 AM. Electronically signed by wild Hays on 01/19/2019 at 10:25 AM

## 2019-01-19 NOTE — MEDICAL/APP STUDENT
Hospital Medicine  History and Physical Exam    Team: [facility] [place] HOSP MED D Mariana Shin MD  Admit Date: 1/18/2019   Chief complaint: abdominal pain  Patient information was obtained from patient and ER records.   Primary care Physician: Primary Doctor No  Code status: Full Code    HPI:   Pt is a 33 year old male with a history of alcoholic pancreatitis and EtOH use disorder who presents with abdominal pain. Started 3 days ago. Associated with nausea, bilious non-bloody vomiting, decreased appetite, and chills. Unable to keep down fluids or food. Pt is currently hungry. No fever. Has had bowel movements. Pt has had similar episodes in the past after drinking EtOH. Pt drinks 2 shots of whiskey daily. Has attempted to quit EtOH in the past, with a 3 month period of abstinence.    Past Medical History: Patient has a past medical history of Seizures.    Past Surgical History: Patient has a past surgical history that includes Appendectomy.    Social History: Patient reports that he has been smoking cigarettes.  He started smoking about 6 years ago. He has a 1.65 pack-year smoking history. he has never used smokeless tobacco. He reports that he drinks alcohol. He reports that he does not use drugs.    Family History: family history includes No Known Problems in his father and mother.    Medications:   Prior to Admission medications    Medication Sig Start Date End Date Taking? Authorizing Provider   ibuprofen (ADVIL,MOTRIN) 200 MG tablet Take 200 mg by mouth every 6 (six) hours as needed for Pain.   Yes Historical Provider, MD   chlordiazepoxide (LIBRIUM) 25 MG Cap Take 1 capsule (25 mg total) by mouth 4 (four) times daily as needed (Withdrawal symptoms). 11/28/18 1/18/19  Ahsan Leon MD   HYDROcodone-acetaminophen (NORCO) 7.5-325 mg per tablet Take 1 tablet by mouth every 6 (six) hours as needed for Pain. 11/28/18 1/18/19  Ahsan Leon MD   ondansetron (ZOFRAN) 4 MG tablet Take 1 tablet (4 mg total) by mouth  every 6 (six) hours. 9/23/18 1/18/19  Italo Ricardo PA-C   ondansetron (ZOFRAN) 4 MG tablet Take 1 tablet (4 mg total) by mouth every 6 (six) hours as needed. 11/28/18 1/18/19  Ahsan Leon MD       Allergies: Patient has No Known Allergies.    ROS  Constitutional: SEE HPI  Respiratory: no cough or shortness of breath  Cardiovascular: no chest pain or palpitations  Gastrointestinal: SEE HPI  Genitourinary: dark urine  Integument/Breast: no rash or pruritis  Hematologic/Lymphatic: no easy bruising or lymphadenopathy  Musculoskeletal: no arthralgias or myalgias  Neurological: no seizures or tremors  Behavioral/Psych: no depression or anxiety    PEx  Temp:  [97.8 °F (36.6 °C)-98.2 °F (36.8 °C)]   Pulse:  [75-99]   Resp:  [11-18]   BP: (106-132)/(76-93)   SpO2:  [97 %-100 %]   Body mass index is 18.88 kg/m².     General appearance: no distress  Mental status: Alert and oriented x 3  HEENT:  conjunctivae/corneas clear, PERRL, tongue non-jaundiced, dry mucous membranes  Neck: supple, thyroid not enlarged  Pulm:   normal respiratory effort, CTA B, no c/w/r  Card: RRR, S1, S2 normal, no murmur, click, rub or gallop, hyperdynamic heart sounds  Abd: epigastric tenderness, decreased bowel sounds  Ext: no c/c/e  Pulses: 2+, symmetric  Skin: color, texture, turgor normal. No rashes or lesions  Neuro: CN II-XII grossly intact, no focal numbness or weakness, normal strength and tone     Intake/Output Summary (Last 24 hours) at 1/18/2019 1932  Last data filed at 1/18/2019 1317  Gross per 24 hour   Intake 2000 ml   Output --   Net 2000 ml     Recent Labs   Lab 01/18/19  1034   WBC 5.97   HGB 14.3   HCT 42.0   *     Recent Labs   Lab 01/18/19  1034   *   K 3.7   CL 92*   CO2 20*   BUN 9   CREATININE 1.0   *   CALCIUM 9.8   LIPASE 445*     Recent Labs   Lab 01/18/19  1034   ALKPHOS 101   *   *   ALBUMIN 4.6   PROT 8.8*   BILITOT 2.7*        Active Hospital Problems    Diagnosis  POA    Acute  alcoholic hepatitis [K70.10]  Yes      Resolved Hospital Problems   No resolved problems to display.       Overview  Pt is a 33 year old male with a history of alcoholic pancreatitis and EtOH use disorder who is admitted for alcoholic pancreatitis. Amendable to rehab and counseling.    Assessment and Plan for Problems addressed today:    Alcoholic pancreatitis  · Clear liquid diet  · Change NS to LR  · Pain management, zofran    Alcohol use disorder  History of DTs  · Valium 5 mg Q8h  · Provide counseling materials (pt receptive)  · Ativan IV prn CIWA >8 or abdnormal vital sings  · Neuro checks Q4h  · IV thiamin/folate and start folate and folbic tomorrow      DVT PPx:   VTE Risk Mitigation (From admission, onward)        Ordered     IP VTE LOW RISK PATIENT  Once      01/18/19 1438     Place FRANCK hose  Until discontinued      01/18/19 1438          Provider   Mariana Shin MD    I personally scribed for Mariana Shin MD on 01/18/2019 at 4:38 PM. Electronically signed by wild Hays on 01/18/2019 at 4:38 PM

## 2019-01-19 NOTE — ASSESSMENT & PLAN NOTE
Pt is a 33yoM w/ h/o alcoholic pancreatitis who is admitted for alcoholic hepatitis.  He reports long history of alcohol consumption, reports escalation of drinking habit over past 2-3 yrs.  He reports medical complications as a result of his drinking.  He denies any h/o complicated withdrawals.  He is not interested in long-term sobriety at this time but is willing to discuss resources available.      Dx:   Alcohol use disorder, mild    Recommendations:   - Discussed outpatient resources options including alcoholics anonymous, outpatient psychiatry follow-up, substance use rehab (inpatient outpatient)--pt willing to review resources if provided, will provide references regarding substance use treatment in the [place] and surrounding areas  - Discussed medication assisted treatment options including disulfuram, naltrexone, acamprosate--pt unwilling to try at this time  - Ongoing medical workup at this time, current hepatic dysfunction assumed to be 2/2 alcohol consumption, ongoing management per primary  - Currently receiving valium 5mg q8hrs, to be tapered by primary  - Recommend ativan 2mg PO/IV q4hrs for witnessed seizures, visible tremor or for any two of the following: SBP>160, DBP>100, HR>100  - Agree with thiamine repletion, daily MVI  - Defer non-psychiatry medications to primary team

## 2019-01-19 NOTE — HPI
"Per Primary MD:  Pt is a 33 year old male with a history of alcoholic pancreatitis and EtOH use disorder who presents with abdominal pain. Started 3 days ago. Associated with nausea, bilious non-bloody vomiting, decreased appetite, and chills. Unable to keep down fluids or food. Pt is currently hungry. No fever. Has had bowel movements. Pt has had similar episodes in the past after drinking EtOH. Pt drinks 2 shots of whiskey daily. Has attempted to quit EtOH in the past, with a 3 month period of abstinence.    Per Psychiatry MD:   Humberto Liz is a 33 y.o. male with a past psychiatric history of alcoholic pancreatitis, currently presenting with <principal problem not specified>.  Psychiatry was consulted to address the patient's symptoms of EtOH use.     Pt reports that he was in his usual state of health until about 3 days ago when he developed abdominal pain.  He reports associated chills, denies fevers, headache, chest pain, shortness of breath, constipation, and diarrhea.  He reports several similar episodes over the past few months.  He denies any sick contacts or recent illnesses.  He assumes that his current symptoms are due to alcohol use "because there was really nothing else going on."  He reports that he started drinking at 16yo with friends.  He reports drinking intermittently at first "once or twice a week" for several years.  He reported that he immigrated from his [place] to the  about 5 yrs ago and that his drinking habit increased in intensity afterwards.  He reports that he drinks nightly now, "2-4 shots of Juan Leyva."  He reports that he continues to drink because it "helps me sleep."  He reports previous withdrawal symptoms of "shakiness," denies seizures or confusion.  He reports that he has never utilized any form of substance use treatment (AA, outpatient MD, rehab, etc.).  He reports no significant periods of sobriety in the past.  He is not willing to stop drinking at " this time but is amenable to discussing resources available to him.    He denies any prior psychiatric diagnoses, has never seen a psychiatrist, and has never taken any psychotropic medications regularly.  He denies any illicit substance use.  He denies any current depressive symptoms, anxiety, manic episodes, h/o hallucinations, current hallucinations, or post-traumatic symptoms.  He is agreeable to speaking to a psychiatrist to continue discussion about his substance use.    SUBJECTIVE   Currently, the patient is endorsing the following:    Medical Review Of Systems:  All systems reviewed and are negative except as above noted in HPI.    Psychiatric Review Of Systems - Is patient experiencing or having changes in:  sleep: no  appetite: no  weight: no  energy/anergy: no  interest/pleasure/anhedonia: no  somatic symptoms: no  guilty/hopelessness: no  concentration: no  S.I.B.s/risky behavior: no  SI/SA:  no    anxiety/panic: no  Agoraphobia:  no  Social phobia:  no  Recurrent nightmares:  no  hyper startle response:  no  Avoidance: no  Recurrent thoughts:  no  Recurrent behaviors:  no    Irritability: no  Racing thoughts: no  Impulsive behaviors: no  Pressured speech:  no    Paranoia:no  Delusions: no  AVH:no    Past Medical/Surgical History:  Past Medical History:   Diagnosis Date    Seizures       has a past medical history of Seizures.  Past Surgical History:   Procedure Laterality Date    APPENDECTOMY         Past Psychiatric History:  Previous Medication Trials: No  Previous Psychiatric Hospitalizations: No  Previous Suicide Attempts: No  History of Violence: No  Outpatient Psychiatrist: No  Outpatient Psychotherapist: No    Social History:  Marital Status:   Children: 3   Employment Status/Info: currently employed as   Education: high school diploma/GED  Special Ed: no  Housing/Lives with: wife and three children in apartment  History of phys/sexual abuse: No  Access to gun: No    Substance  Abuse History:  Recreational Drugs: denied  Use of Alcohol: moderate  Rehab History:no   Tobacco Use:yes, 2 cigarette daily  Legal consequences of chemical use: no  Is the patient aware of the biomedical complications associated with substance abuse and mental illness? Yes    Legal History:  Past Charges/Incarcerations:no  Pending charges:no     Family Psychiatric History:   Denied    Psychosocial Factors:  Maladaptive or problem behaviors: nightly drinking  Peer group, social, ethic, cultural, emotional, and health factors: lives with wife and children, h/o pancreatitis, current alcoholic hepatitis  Living situation, family constellation, family circumstances/home: living in apartment  Recovery environment: home  Community resources used by patient: none  Treatment acceptance/motivation for change: hesitant to commit to cessation, willing to discuss resources

## 2019-01-19 NOTE — SUBJECTIVE & OBJECTIVE
Patient History           Medical as of 1/18/2019     Past Medical History     Diagnosis Date Comments Source    Seizures -- -- Provider          Pertinent Negatives     Diagnosis Date Noted Comments Source    Anticoagulant long-term use 03/10/2018 -- Provider    Arthritis 03/10/2018 -- Provider    Asthma 03/10/2018 -- Provider    Cancer 03/10/2018 -- Provider    CHF (congestive heart failure) 03/10/2018 -- Provider    COPD (chronic obstructive pulmonary disease) 03/10/2018 -- Provider    Coronary artery disease 03/10/2018 -- Provider    Diabetes mellitus 03/10/2018 -- Provider    Encounter for blood transfusion 03/10/2018 -- Provider    Hypertension 03/10/2018 -- Provider    Stroke 03/10/2018 -- Provider    Thyroid disease 03/10/2018 -- Provider    Transfusion reaction 03/10/2018 -- Provider                  Surgical as of 1/18/2019     Past Surgical History     Procedure Laterality Date Comments Source    APPENDECTOMY -- -- -- Provider                  Family as of 1/18/2019     Problem Relation Name Age of Onset Comments Source    No Known Problems Mother -- -- -- Provider    No Known Problems Father -- -- -- Provider            Tobacco Use as of 1/18/2019     Smoking Status Smoking Start Date Smoking Quit Date Packs/Day Years Used    Current Every Day Smoker 3/10/2012 -- 0.33 5.00    Types Comments Smokeless Tobacco Status Smokeless Tobacco Quit Date Source     Cigarettes -- Never Used -- Provider            Alcohol Use as of 1/18/2019     Alcohol Use Drinks/Week Alcohol/Week Comments Source    Yes -- -- / started again 1 pint per day Provider    Frequency Standard Drinks Binge Drinking Source      -- -- -- Provider             Drug Use as of 1/18/2019     Drug Use Types Frequency Comments Source    No -- -- -- Provider            Sexual Activity as of 1/18/2019     Sexually Active Birth Control Partners Comments Source    Not Currently -- -- -- Provider            Activities of Daily Living as of 1/18/2019     None           Social Documentation as of 1/18/2019    None           Occupational as of 1/18/2019    None           Socioeconomic as of 1/18/2019     Marital Status Spouse Name Number of Children Years Education Education Level Preferred Language Ethnicity Race Source     -- -- -- -- Chinese (Other) Central American White --    Financial Resource Strain Food Insecurity: Worry Food Insecurity: Inability Transportation Needs: Medical Transportation Needs: Non-medical       -- -- -- -- --             Pertinent History     Question Response Comments    Lives with -- --    Place in Birth Order -- --    Lives in -- --    Number of Siblings -- --    Raised by -- --    Legal Involvement -- --    Childhood Trauma -- --    Criminal History of -- --    Financial Status -- --    Highest Level of Education -- --    Does patient have access to a firearm? -- --     Service -- --    Primary Leisure Activity -- --    Spirituality -- --        Past Medical History:   Diagnosis Date    Seizures      Past Surgical History:   Procedure Laterality Date    APPENDECTOMY       Family History     Problem Relation (Age of Onset)    No Known Problems Mother, Father        Tobacco Use    Smoking status: Current Every Day Smoker     Packs/day: 0.33     Years: 5.00     Pack years: 1.65     Types: Cigarettes     Start date: 3/10/2012    Smokeless tobacco: Never Used   Substance and Sexual Activity    Alcohol use: Yes     Comment: / started again 1 pint per day    Drug use: No    Sexual activity: Not Currently     Review of patient's allergies indicates:  No Known Allergies    No current facility-administered medications on file prior to encounter.      Current Outpatient Medications on File Prior to Encounter   Medication Sig    ibuprofen (ADVIL,MOTRIN) 200 MG tablet Take 200 mg by mouth every 6 (six) hours as needed for Pain.    [DISCONTINUED] chlordiazepoxide (LIBRIUM) 25 MG Cap Take 1 capsule (25 mg total) by mouth 4  "(four) times daily as needed (Withdrawal symptoms).    [DISCONTINUED] HYDROcodone-acetaminophen (NORCO) 7.5-325 mg per tablet Take 1 tablet by mouth every 6 (six) hours as needed for Pain.    [DISCONTINUED] ondansetron (ZOFRAN) 4 MG tablet Take 1 tablet (4 mg total) by mouth every 6 (six) hours.    [DISCONTINUED] ondansetron (ZOFRAN) 4 MG tablet Take 1 tablet (4 mg total) by mouth every 6 (six) hours as needed.     Psychotherapeutics (From admission, onward)    Start     Stop Route Frequency Ordered    01/18/19 2030  lorazepam injection 1 mg      -- IV Every 30 min PRN 01/18/19 1931    01/18/19 1600  diazePAM tablet 5 mg      -- Oral Every 8 hours 01/18/19 1546        Review of Systems  Strengths and Liabilities: Strength: Patient accepts guidance/feedback, Strength: Patient is intelligent., Strength: Patient has positive support network.    Objective:     Vital Signs (Most Recent):  Temp: 98.2 °F (36.8 °C) (01/18/19 1907)  Pulse: 75 (01/18/19 1907)  Resp: 14 (01/18/19 1907)  BP: (!) 132/93 (01/18/19 1907)  SpO2: 97 % (01/18/19 1831) Vital Signs (24h Range):  Temp:  [97.8 °F (36.6 °C)-98.2 °F (36.8 °C)] 98.2 °F (36.8 °C)  Pulse:  [75-99] 75  Resp:  [11-18] 14  SpO2:  [97 %-100 %] 97 %  BP: (106-132)/(76-93) 132/93     Height: 5' 4" (162.6 cm)  Weight: 49.9 kg (110 lb)  Body mass index is 18.88 kg/m².      Intake/Output Summary (Last 24 hours) at 1/18/2019 2024  Last data filed at 1/18/2019 1317  Gross per 24 hour   Intake 2000 ml   Output --   Net 2000 ml       Physical Exam   Psychiatric:   Mental Status Exam:  Appearance: unremarkable, age appropriate, normal weight, lying in bed  Level of Consciousness: awake, alert  Behavior/Cooperation: friendly and cooperative  Psychomotor: unremarkable   Speech: normal tone, normal rate, normal pitch, normal volume  Language: english, fluid  Orientation: person, place, situation, month of year, year  Attention Span/Concentration: recalled days of wk backwards  Memory: " "Intact  Mood: "ok"  Affect: constricted  Thought Process: linear, goal-directed  Associations: normal and logical  Thought Content: normal, no suicidality, no homicidality, delusions, or paranoia  Fund of Knowledge: Aware of current events  Abstraction: similarities were abstract  Insight: limited  Judgment: good          Significant Labs:   Last 24 Hours:   Recent Lab Results       01/18/19  1141   01/18/19  1034        Immature Granulocytes   0.3     Immature Grans (Abs)   0.02  Comment:  Mild elevation in immature granulocytes is non specific and   can be seen in a variety of conditions including stress response,   acute inflammation, trauma and pregnancy. Correlation with other   laboratory and clinical findings is essential.       Albumin   4.6     Alkaline Phosphatase   101     ALT   216     Ammonium Urate Letty, UA Many       Anion Gap   23     Appearance, UA Cloudy       AST   421     Bacteria, UA None       Baso #   0.06     Basophil%   1.0     Bilirubin (UA) 1+  Comment:  Positive urine bilirubin is not confirmed. Correlate with   serum bilirubin and clinical presentation.         Total Bilirubin   2.7  Comment:  For infants and newborns, interpretation of results should be based  on gestational age, weight and in agreement with clinical  observations.  Premature Infant recommended reference ranges:  Up to 24 hours.............<8.0 mg/dL  Up to 48 hours............<12.0 mg/dL  3-5 days..................<15.0 mg/dL  6-29 days.................<15.0 mg/dL       BUN, Bld   9     Calcium   9.8     Chloride   92     CO2   20     Color, UA Nhung       Creatinine   1.0     Differential Method   Automated     eGFR if    >60.0     eGFR if non    >60.0  Comment:  Calculation used to obtain the estimated glomerular filtration  rate (eGFR) is the CKD-EPI equation.        Eos #   0.1     Eosinophil%   1.5     Glucose   126     Glucose, UA Negative       Gran # (ANC)   4.2     Gran%   70.6  "    Hematocrit   42.0     Hemoglobin   14.3     Hyaline Casts, UA 0       Ketones, UA 3+       Leukocytes, UA Negative       Lipase   445     Lymph #   0.8     Lymph%   13.9     MCH   32.4     MCHC   34.0     MCV   95     Microscopic Comment SEE COMMENT  Comment:  Other formed elements not mentioned in the report are not   present in the microscopic examination.          Mono #   0.8     Mono%   12.7     MPV   10.0     Nitrite, UA Negative       nRBC   0     Occult Blood UA Negative       pH, UA 6.0       Platelets   131     Potassium   3.7     Total Protein   8.8     Protein, UA 2+  Comment:  Recommend a 24 hour urine protein or a urine   protein/creatinine ratio if globulin induced proteinuria is  clinically suspected.         RBC   4.41     RBC, UA 1       RDW   11.4     Sodium   135     Specific Gravity, UA 1.030       Specimen UA Urine, Clean Catch       WBC, UA 1       WBC   5.97           Significant Imaging: I have reviewed all pertinent imaging results/findings within the past 24 hours.

## 2019-01-19 NOTE — PLAN OF CARE
Problem: Pain (Pancreatitis)  Goal: Acceptable Pain Control  Outcome: Ongoing (interventions implemented as appropriate)  Patient has had no c/o or s/s of pain this shift.  No nausea and no vomiting.  Patient rested well this shift.  Tolerated jello and water.  Bed locked and in lowest position.  Personal items in reach.  IVF continue.

## 2019-01-19 NOTE — H&P
Physician Attestation for Scribe:  I, Mariana Shin MD, personally performed the services described in this documentation. All medical record entries made by the scribe were at my direction and in my presence.  I have reviewed the chart and agree that the record reflects my personal performance and is accurate and complete.   Mariana Shin MD    [facility] Medicine  History and Physical Exam    Team: [facility]P MED D Mariana Shin MD  Admit Date: 1/18/2019   Chief complaint: abdominal pain  Patient information was obtained from patient and ER records.   Primary care Physician: Primary Doctor No  Code status: Full Code    HPI:   Pt is a 33 year old male with a history of alcoholic pancreatitis and EtOH use disorder who presents with abdominal pain. Started 3 days ago. Associated with nausea, bilious non-bloody vomiting, decreased appetite, and chills. Unable to keep down fluids or food. Pt is currently hungry. No fever. Has had bowel movements. Pt has had similar episodes in the past after drinking EtOH. Pt drinks 2 shots of whiskey daily. Has attempted to quit EtOH in the past, with a 3 month period of abstinence.    Past Medical History: Patient has a past medical history of Seizures.    Past Surgical History: Patient has a past surgical history that includes Appendectomy.    Social History: Patient reports that he has been smoking cigarettes.  He started smoking about 6 years ago. He has a 1.65 pack-year smoking history. he has never used smokeless tobacco. He reports that he drinks alcohol. He reports that he does not use drugs.    Family History: family history includes No Known Problems in his father and mother.    Medications:   Prior to Admission medications    Medication Sig Start Date End Date Taking? Authorizing Provider   ibuprofen (ADVIL,MOTRIN) 200 MG tablet Take 200 mg by mouth every 6 (six) hours as needed for Pain.   Yes Historical Provider, MD   chlordiazepoxide (LIBRIUM) 25 MG Cap Take 1 capsule (25  mg total) by mouth 4 (four) times daily as needed (Withdrawal symptoms). 11/28/18 1/18/19  Ahsan Leon MD   HYDROcodone-acetaminophen (NORCO) 7.5-325 mg per tablet Take 1 tablet by mouth every 6 (six) hours as needed for Pain. 11/28/18 1/18/19  Ahsan Leon MD   ondansetron (ZOFRAN) 4 MG tablet Take 1 tablet (4 mg total) by mouth every 6 (six) hours. 9/23/18 1/18/19  Italo Ricardo PA-C   ondansetron (ZOFRAN) 4 MG tablet Take 1 tablet (4 mg total) by mouth every 6 (six) hours as needed. 11/28/18 1/18/19  Ahsan Leon MD       Allergies: Patient has No Known Allergies.    ROS  Constitutional: SEE HPI  Respiratory: no cough or shortness of breath  Cardiovascular: no chest pain or palpitations  Gastrointestinal: SEE HPI  Genitourinary: dark urine  Integument/Breast: no rash or pruritis  Hematologic/Lymphatic: no easy bruising or lymphadenopathy  Musculoskeletal: no arthralgias or myalgias  Neurological: no seizures or tremors  Behavioral/Psych: no depression or anxiety    PEx  Temp:  [97.8 °F (36.6 °C)-98.2 °F (36.8 °C)]   Pulse:  [75-99]   Resp:  [11-18]   BP: (106-132)/(76-93)   SpO2:  [97 %-100 %]   Body mass index is 18.88 kg/m².     General appearance: no distress  Mental status: Alert and oriented x 3  HEENT:  conjunctivae/corneas clear, PERRL, tongue non-jaundiced, dry mucous membranes  Neck: supple, thyroid not enlarged  Pulm:   normal respiratory effort, CTA B, no c/w/r  Card: RRR, S1, S2 normal, no murmur, click, rub or gallop, hyperdynamic heart sounds  Abd: epigastric tenderness, decreased bowel sounds  Ext: no c/c/e  Pulses: 2+, symmetric  Skin: color, texture, turgor normal. No rashes or lesions  Neuro: CN II-XII grossly intact, no focal numbness or weakness, normal strength and tone     Intake/Output Summary (Last 24 hours) at 1/18/2019 1933  Last data filed at 1/18/2019 1317  Gross per 24 hour   Intake 2000 ml   Output --   Net 2000 ml     Recent Labs   Lab 01/18/19  1034   WBC  5.97   HGB 14.3   HCT 42.0   *     Recent Labs   Lab 01/18/19  1034   *   K 3.7   CL 92*   CO2 20*   BUN 9   CREATININE 1.0   *   CALCIUM 9.8   LIPASE 445*     Recent Labs   Lab 01/18/19  1034   ALKPHOS 101   *   *   ALBUMIN 4.6   PROT 8.8*   BILITOT 2.7*        Active Hospital Problems    Diagnosis  POA    Acute alcoholic hepatitis [K70.10]  Yes      Resolved Hospital Problems   No resolved problems to display.       Overview  Pt is a 33 year old male with a history of alcoholic pancreatitis and EtOH use disorder who is admitted for alcoholic pancreatitis. Amendable to rehab and counseling.    Assessment and Plan for Problems addressed today:    Alcoholic pancreatitis  · Clear liquid diet  · Change NS to LR  · Pain management, zofran    Alcohol use disorder  History of DTs  · Valium 5 mg Q8h  · Provide counseling materials (pt receptive)  · Ativan IV prn CIWA >8 or abdnormal vital sings  · Neuro checks Q4h  · IV thiamin/folate and start folate and folbic tomorrow      DVT PPx:   VTE Risk Mitigation (From admission, onward)        Ordered     IP VTE LOW RISK PATIENT  Once      01/18/19 1438     Place FRANCK hose  Until discontinued      01/18/19 1438          Provider   Mariana Shin MD    I personally scribed for Mariana Shin MD on 01/18/2019 at 4:38 PM. Electronically signed by wild Hays on 01/18/2019 at 4:38 PM

## 2019-01-20 VITALS
HEIGHT: 64 IN | TEMPERATURE: 98 F | HEART RATE: 92 BPM | DIASTOLIC BLOOD PRESSURE: 87 MMHG | BODY MASS INDEX: 18.78 KG/M2 | SYSTOLIC BLOOD PRESSURE: 128 MMHG | WEIGHT: 110 LBS | OXYGEN SATURATION: 99 % | RESPIRATION RATE: 14 BRPM

## 2019-01-20 LAB
ALBUMIN SERPL BCP-MCNC: 3.2 G/DL
ALP SERPL-CCNC: 88 U/L
ALT SERPL W/O P-5'-P-CCNC: 147 U/L
ANION GAP SERPL CALC-SCNC: 7 MMOL/L
AST SERPL-CCNC: 257 U/L
BILIRUB SERPL-MCNC: 1.4 MG/DL
BUN SERPL-MCNC: 3 MG/DL
CALCIUM SERPL-MCNC: 8.7 MG/DL
CHLORIDE SERPL-SCNC: 99 MMOL/L
CO2 SERPL-SCNC: 30 MMOL/L
CREAT SERPL-MCNC: 0.6 MG/DL
EST. GFR  (AFRICAN AMERICAN): >60 ML/MIN/1.73 M^2
EST. GFR  (NON AFRICAN AMERICAN): >60 ML/MIN/1.73 M^2
GLUCOSE SERPL-MCNC: 127 MG/DL
MAGNESIUM SERPL-MCNC: 2.1 MG/DL
PHOSPHATE SERPL-MCNC: 2 MG/DL
POTASSIUM SERPL-SCNC: 3.2 MMOL/L
PROT SERPL-MCNC: 6.5 G/DL
SODIUM SERPL-SCNC: 136 MMOL/L

## 2019-01-20 PROCEDURE — 80053 COMPREHEN METABOLIC PANEL: CPT

## 2019-01-20 PROCEDURE — 84100 ASSAY OF PHOSPHORUS: CPT

## 2019-01-20 PROCEDURE — 99239 HOSP IP/OBS DSCHRG MGMT >30: CPT | Mod: ,,, | Performed by: INTERNAL MEDICINE

## 2019-01-20 PROCEDURE — 36415 COLL VENOUS BLD VENIPUNCTURE: CPT

## 2019-01-20 PROCEDURE — 25000003 PHARM REV CODE 250: Performed by: INTERNAL MEDICINE

## 2019-01-20 PROCEDURE — 99239 PR HOSPITAL DISCHARGE DAY,>30 MIN: ICD-10-PCS | Mod: ,,, | Performed by: INTERNAL MEDICINE

## 2019-01-20 PROCEDURE — 83735 ASSAY OF MAGNESIUM: CPT

## 2019-01-20 RX ORDER — DIAZEPAM 2 MG/1
2 TABLET ORAL 2 TIMES DAILY
Status: DISCONTINUED | OUTPATIENT
Start: 2019-01-20 | End: 2019-01-20 | Stop reason: HOSPADM

## 2019-01-20 RX ORDER — SODIUM,POTASSIUM PHOSPHATES 280-250MG
2 POWDER IN PACKET (EA) ORAL
Refills: 0 | COMMUNITY
Start: 2019-01-20

## 2019-01-20 RX ORDER — LANOLIN ALCOHOL/MO/W.PET/CERES
100 CREAM (GRAM) TOPICAL DAILY
Qty: 30 TABLET | Refills: 11 | COMMUNITY
Start: 2019-01-21

## 2019-01-20 RX ORDER — DIAZEPAM 2 MG/1
2 TABLET ORAL 2 TIMES DAILY
Qty: 2 TABLET | Refills: 0 | Status: SHIPPED | OUTPATIENT
Start: 2019-01-20 | End: 2019-01-21

## 2019-01-20 RX ADMIN — DIAZEPAM 5 MG: 5 TABLET ORAL at 09:01

## 2019-01-20 RX ADMIN — POTASSIUM & SODIUM PHOSPHATES POWDER PACK 280-160-250 MG 2 PACKET: 280-160-250 PACK at 12:01

## 2019-01-20 RX ADMIN — Medication 1 TABLET: at 09:01

## 2019-01-20 RX ADMIN — POTASSIUM & SODIUM PHOSPHATES POWDER PACK 280-160-250 MG 2 PACKET: 280-160-250 PACK at 09:01

## 2019-01-20 RX ADMIN — Medication 100 MG: at 09:01

## 2019-01-20 NOTE — PROGRESS NOTES
Physician Attestation for Scribe:  I, Mariana Shin MD, personally performed the services described in this documentation. All medical record entries made by the scribe were at my direction and in my presence.  I have reviewed the chart and agree that the record reflects my personal performance and is accurate and complete.   Mariana Shin MD    Hospital Medicine  Progress note    Team: [facility] MED D Mariana Shin MD  Admit Date: 1/18/2019  ABEL   Code status: Full Code  Length of Stay: 2 day(s)    Principal Problem:  Alcohol-induced acute pancreatitis    Interval hx:  Feeling better, hungry.    ROS   Respiratory: no cough or shortness of breath  Cardiovascular: no chest pain or palpitations  Gastrointestinal: no nausea or vomiting, no abdominal pain or change in bowel habits  Behavioral/Psych: no depression or anxiety    PEx  Temp:  [98 °F (36.7 °C)]   Pulse:  []   Resp:  [13-14]   BP: (103-134)/(77-95)   SpO2:  [95 %-98 %]     Intake/Output Summary (Last 24 hours) at 1/20/2019 0032  Last data filed at 1/19/2019 0500  Gross per 24 hour   Intake --   Output 400 ml   Net -400 ml     General Appearance: no acute distress   Heart: no longer hyperdynamic heart sounds  Respiratory: Normal respiratory effort, no crackles   Abdomen: abdominal tenderness improved  Skin: intact. IV sites ok  Neurologic:  No focal numbness or weakness  Mental status: Alert, oriented x 4, affect appropriate     Recent Labs   Lab 01/18/19  1034   WBC 5.97   HGB 14.3   HCT 42.0   *     Recent Labs   Lab 01/18/19  1034 01/19/19  0814   * 133*   K 3.7 3.0*   CL 92* 98   CO2 20* 26   BUN 9 2*   CREATININE 1.0 0.8   * 208*   CALCIUM 9.8 8.5*   MG  --  1.4*   PHOS  --  <1.0*   LIPASE 445*  --      Recent Labs   Lab 01/18/19  1034 01/19/19  0814   ALKPHOS 101 69   * 132*   * 228*   ALBUMIN 4.6 3.1*   PROT 8.8* 6.2   BILITOT 2.7* 2.2*      Scheduled Meds:   diazePAM  5 mg Oral BID    folic acid-vit B6-vit B12  2.5-25-2 mg  1 tablet Oral Daily    potassium, sodium phosphates  2 packet Oral QID (AC & HS)    thiamine  100 mg Oral Daily     Continuous Infusions:   dextrose 5% lactated ringers 100 mL/hr at 01/19/19 2246     As Needed:  acetaminophen, HYDROmorphone, ketorolac, lorazepam, metoclopramide HCl, ondansetron, sodium chloride 0.9%    Active Hospital Problems    Diagnosis  POA    *Alcohol-induced acute pancreatitis [K85.20]  Yes    Acute alcoholic hepatitis [K70.10]  Yes    Alcohol dependence [F10.20]  Yes      Resolved Hospital Problems   No resolved problems to display.       Overview  Pt is a 33 year old male with a history of alcoholic pancreatitis and EtOH use disorder who is admitted for alcoholic pancreatitis. Amendable to rehab and counseling. Rapid improvement.    Assessment and Plan for Problems addressed today:    Alcoholic pancreatitis  · Clear liquid diet  · Change NS to LR  · Pain management, zofran     Alcohol use disorder  History of DTs  · Valium 5 mg Q8h reduced to BID  · Provide counseling materials (pt receptive)  · Ativan IV prn CIWA >8 or abnormal vital signs  · Neuro checks Q4h  · IV thiamin/folate and folbic (started 1/19)  · Psychiatry consulted, pt refusing medication assistance (disulfram, naltrexone, acamprosate), outpt treatment options discussed (1/19)    Hypophosphatemia  Hypokalemia  Hypomagnesemia  · replacing    Diet:  Diet [place]  DVT PPx:   VTE Risk Mitigation (From admission, onward)        Ordered     IP VTE LOW RISK PATIENT  Once      01/18/19 1438     Place FRANCK hose  Until discontinued      01/18/19 1438        Lines/ Drains/ Airways:  PIV    Discharge plan and follow up  Home    Provider Mariana Shin MD    I personally scribed for Mariana Shin MD on 01/20/2019 at 10:25 AM. Electronically signed by wild Hays on 01/20/2019 at 10:25 AM

## 2019-01-20 NOTE — MEDICAL/APP STUDENT
Hospital Medicine  Progress note    Team: [facility] MED D Mariana Shin MD  Admit Date: 1/18/2019  ABEL   Code status: Full Code  Length of Stay: 2 day(s)    Principal Problem:  Alcohol-induced acute pancreatitis    Interval hx:  Feeling better, hungry.    ROS   Respiratory: no cough or shortness of breath  Cardiovascular: no chest pain or palpitations  Gastrointestinal: no nausea or vomiting, no abdominal pain or change in bowel habits  Behavioral/Psych: no depression or anxiety    PEx  Temp:  [98 °F (36.7 °C)]   Pulse:  []   Resp:  [13-14]   BP: (103-134)/(77-95)   SpO2:  [95 %-98 %]     Intake/Output Summary (Last 24 hours) at 1/20/2019 0031  Last data filed at 1/19/2019 0500  Gross per 24 hour   Intake --   Output 400 ml   Net -400 ml     General Appearance: no acute distress   Heart: no longer hyperdynamic heart sounds  Respiratory: Normal respiratory effort, no crackles   Abdomen: abdominal tenderness improved  Skin: intact. IV sites ok  Neurologic:  No focal numbness or weakness  Mental status: Alert, oriented x 4, affect appropriate     Recent Labs   Lab 01/18/19  1034   WBC 5.97   HGB 14.3   HCT 42.0   *     Recent Labs   Lab 01/18/19  1034 01/19/19  0814   * 133*   K 3.7 3.0*   CL 92* 98   CO2 20* 26   BUN 9 2*   CREATININE 1.0 0.8   * 208*   CALCIUM 9.8 8.5*   MG  --  1.4*   PHOS  --  <1.0*   LIPASE 445*  --      Recent Labs   Lab 01/18/19  1034 01/19/19  0814   ALKPHOS 101 69   * 132*   * 228*   ALBUMIN 4.6 3.1*   PROT 8.8* 6.2   BILITOT 2.7* 2.2*      Scheduled Meds:   diazePAM  5 mg Oral BID    folic acid-vit B6-vit B12 2.5-25-2 mg  1 tablet Oral Daily    potassium, sodium phosphates  2 packet Oral QID (AC & HS)    thiamine  100 mg Oral Daily     Continuous Infusions:   dextrose 5% lactated ringers 100 mL/hr at 01/19/19 2246     As Needed:  acetaminophen, HYDROmorphone, ketorolac, lorazepam, metoclopramide HCl, ondansetron, sodium chloride 0.9%    Active  Hospital Problems    Diagnosis  POA    *Alcohol-induced acute pancreatitis [K85.20]  Yes    Acute alcoholic hepatitis [K70.10]  Yes    Alcohol dependence [F10.20]  Yes      Resolved Hospital Problems   No resolved problems to display.       Overview  Pt is a 33 year old male with a history of alcoholic pancreatitis and EtOH use disorder who is admitted for alcoholic pancreatitis. Amendable to rehab and counseling. Rapid improvement.    Assessment and Plan for Problems addressed today:    Alcoholic pancreatitis  · Clear liquid diet  · Change NS to LR  · Pain management, zofran     Alcohol use disorder  History of DTs  · Valium 5 mg Q8h reduced to BID  · Provide counseling materials (pt receptive)  · Ativan IV prn CIWA >8 or abnormal vital signs  · Neuro checks Q4h  · IV thiamin/folate and folbic (started 1/19)  · Psychiatry consulted, pt refusing medication assistance (disulfram, naltrexone, acamprosate), outpt treatment options discussed (1/19)    Hypophosphatemia  Hypokalemia  Hypomagnesemia  · replacing    Diet:  Diet [place]  DVT PPx:   VTE Risk Mitigation (From admission, onward)        Ordered     IP VTE LOW RISK PATIENT  Once      01/18/19 1438     Place FRANCK hose  Until discontinued      01/18/19 1438        Lines/ Drains/ Airways:  PIV    Discharge plan and follow up  Home    Provider Mariana Shin MD    I personally scribed for Mariana Shin MD on 01/20/2019 at 10:25 AM. Electronically signed by wild Hays on 01/20/2019 at 10:25 AM

## 2019-01-20 NOTE — MEDICAL/APP STUDENT
Hospital Medicine  Progress note    Team: [facility] MED D Mariana Shin MD  Admit Date: 1/18/2019  ABEL   Code status: Full Code  Length of Stay: 2 day(s)    Principal Problem:  Alcohol-induced acute pancreatitis    Interval hx:  Eating, no complaints.    ROS   Respiratory: no cough or shortness of breath  Cardiovascular: no chest pain or palpitations  Gastrointestinal: no nausea or vomiting, no abdominal pain or change in bowel habits,  Behavioral/Psych: no depression or anxiety    PEx  Temp:  [98 °F (36.7 °C)-98.7 °F (37.1 °C)]   Pulse:  []   Resp:  [14-17]   BP: ()/(71-81)   SpO2:  [97 %-100 %]   No intake or output data in the 24 hours ending 01/20/19 1019  General Appearance: no acute distress   Heart: no longer hyperdynamic heart sounds  Respiratory: Normal respiratory effort, no crackles   Abdomen: abdominal tenderness improved  Skin: intact. IV sites ok  Neurologic:  No focal numbness or weakness  Mental status: Alert, oriented x 4, affect appropriate     Recent Labs   Lab 01/18/19  1034   WBC 5.97   HGB 14.3   HCT 42.0   *     Recent Labs   Lab 01/18/19  1034 01/19/19  0814 01/20/19  0645   * 133* 136   K 3.7 3.0* 3.2*   CL 92* 98 99   CO2 20* 26 30*   BUN 9 2* 3*   CREATININE 1.0 0.8 0.6   * 208* 127*   CALCIUM 9.8 8.5* 8.7   MG  --  1.4* 2.1   PHOS  --  <1.0* 2.0*   LIPASE 445*  --   --      Recent Labs   Lab 01/18/19  1034 01/19/19  0814 01/20/19  0645   ALKPHOS 101 69 88   * 132* 147*   * 228* 257*   ALBUMIN 4.6 3.1* 3.2*   PROT 8.8* 6.2 6.5   BILITOT 2.7* 2.2* 1.4*      Scheduled Meds:   diazePAM  5 mg Oral BID    folic acid-vit B6-vit B12 2.5-25-2 mg  1 tablet Oral Daily    potassium, sodium phosphates  2 packet Oral QID (AC & HS)    thiamine  100 mg Oral Daily     Continuous Infusions:   dextrose 5% lactated ringers 100 mL/hr at 01/19/19 5009     As Needed:  acetaminophen, HYDROmorphone, ketorolac, lorazepam, metoclopramide HCl, ondansetron, sodium  chloride 0.9%    Active Hospital Problems    Diagnosis  POA    *Alcohol-induced acute pancreatitis [K85.20]  Yes    Acute alcoholic hepatitis [K70.10]  Yes    Alcohol dependence [F10.20]  Yes      Resolved Hospital Problems   No resolved problems to display.       Overview  Pt is a 33 year old male with a history of alcoholic pancreatitis and EtOH use disorder who is admitted for alcoholic pancreatitis. Amendable to rehab and counseling. Rapid improvement.    Assessment and Plan for Problems addressed today:    Alcoholic pancreatitis  · Clear liquid diet  · Change NS to LR  · Pain management, zofran     Alcohol use disorder  History of DTs  · Valium 5 mg Q8h reduced to 5 mg BID to 2 mg BID  · Provide counseling materials (pt receptive)  · Ativan IV prn CIWA >8 or abnormal vital signs  · Neuro checks Q4h  · IV thiamin/folate and folbic (started 1/19)  · Psychiatry consulted, pt refusing medication assistance (disulfram, naltrexone, acamprosate), outpt treatment options discussed (1/19)    Hypophosphatemia  Hypokalemia  Hypomagnesemia  · replacing    Diet:  Diet [place]  DVT PPx:   VTE Risk Mitigation (From admission, onward)        Ordered     IP VTE LOW RISK PATIENT  Once      01/18/19 1438     Place FRANCK hose  Until discontinued      01/18/19 1438        Lines/ Drains/ Airways:  PIV    Discharge plan and follow up  Home    Provider Mariana Shin MD    I personally scribed for Mariana Shin MD on 01/20/2019 at 10:25 AM. Electronically signed by wild Hays on 01/20/2019 at 10:25 AM

## 2019-01-20 NOTE — DISCHARGE INSTRUCTIONS
Mental health resources     [facility] Outpatient Clinics  - [facility] MHC: [address], [phone]  - [place] [facility]: [address], [phone]  - [facility]: [address], [place], [phone]  - [facility]: [address], [phone]  - [facility] ([facility]): [address], [phone]  - [facility]): [address] [place], [phone]  - [facility]): [phone]  - [facility] [phone]     Hospitals in [place] and Private Outpatient Clinics  - [facility]: [address] 4th floor, [phone]  - [facility]: [address], 3rd Floor[facility], [phone]  - [facility]: [address] [facility], [phone], [phone]  - [facility]: [address], [address], [phone]    Outpatient Group Therapy  - Cancer Support Group: [facility], [address], [place], [phone]  - Depression/Bipolar Support [place]: [facility], [address], [place], 7:30pm 1st & 3rd Tuesdays in cafeteria, [phone]  - Heart Transplant Support Group: [facility], 3rd Wednesday, 7th floor conference room, [place] u[place], [phone]  - [facility] [place] [facility] ([facility]): [address], [place], [phone]  - [facility]: [facility] [address], [place]icolette Guerra, [phone]    Outpatient Drug Rehab   - [facility]: [address], 4th Floor, [place], [phone]  - Alcoholics Anonymous: www.aa[place].org, 24 Hr Hotline:251.596.5874, Main: [phone]  - [place] [facility]: [address], [phone], Tuesdays at 10am, Cm Blackman (ext. [phone])  - [facility]: [address], [phone]  - [place]  [facility]: [address] [facility] [address], [phone]  - [facility]: [address], [phone]    Inpatient Drug Rehab  - [facility]: [address], [phone]   - [facility]: [address], [phone]   - [facility]: [address], [place], [phone]  - [facility]: [phone]  - [facility] <f[facility] only> ([employer]): [address], [phone], ext [phone], Deanna Weinberg  - [facility] <Fee based>: [address], [place], [phone]    Health Clinics / Dental Clinics / Indigent Pharmacy  - [facility]: [address], [phone]  - [facility]: [address] [facility], [phone], Mon-Thur 9am-4pm, Fri 9am-12pm  - [place] [facility]: [address], [phone]  - [facility]: [address], [phone], fax [phone]  - [facility] (dental): [address] [place] [address]  [phone]  - [facility] Office of [facility], [address], [phone]  - [facility] [place] (dental): [address], [place], [phone]  - [facility] (Pharmacy): [address], [address], ([facility]), [phone], Mon/Wed 8am-1pm    s Offices:  - [facility] s Office: Dr. Samuel, [phone], [phone]  - [facility] s Office: Dr Jose Chavira: [phone]    Crisis  - [facility] Hotline, [phone]  - National Suicide Prevention Crisis Hotline: [phone]

## 2019-01-20 NOTE — MEDICAL/APP STUDENT
Discharge Summary  Hospital Medicine    Attending Provider on Discharge: Mariana Shin MD  [facility] Medicine Team: [facility]OS[facility] MED D  Date of Admission:  1/18/2019     Date of Discharge:  1/20/2019  Code status: Full Code    Active Hospital Problems    Diagnosis  POA    *Alcohol-induced acute pancreatitis [K85.20]  Yes    Acute alcoholic hepatitis [K70.10]  Yes    Alcohol dependence [F10.20]  Yes      Resolved Hospital Problems   No resolved problems to display.        HPI  Pt is a 33 year old male with a history of alcoholic pancreatitis and EtOH use disorder who presents with abdominal pain. Started 3 days ago. Associated with nausea, bilious non-bloody vomiting, decreased appetite, and chills. Unable to keep down fluids or food. Pt is currently hungry. No fever. Has had bowel movements. Pt has had similar episodes in the past after drinking EtOH. Pt drinks 2 shots of whiskey daily. Has attempted to quit EtOH in the past, with a 3 month period of abstinence.    Hospital Course  Pt is a 33 year old male with a history of alcoholic pancreatitis and EtOH use disorder who is admitted for alcoholic pancreatitis. Amendable to rehab and counseling. Rapid improvement. Received benzo taper for alcohol withdrawal prophylaxis. Seen by psychiatry, provided outpt rehab resources.    Alcoholic pancreatitis  · Clear liquid diet  · Change NS to LR  · Pain management, zofran     Alcohol use disorder  History of DTs  · Valium 5 mg Q8h reduced to 5 mg BID to 2 mg BID  · Provide counseling materials (pt receptive)  · Ativan IV prn CIWA >8 or abnormal vital signs  · Neuro checks Q4h  · IV thiamin/folate and folbic (started 1/19)  · Psychiatry consulted, pt refusing medication assistance (disulfram, naltrexone, acamprosate), outpt treatment options discussed (1/19)     Hypophosphatemia  Hypokalemia  Hypomagnesemia  · replacing    Recent Labs   Lab 01/18/19  1034   WBC 5.97   HGB 14.3   HCT 42.0   *     Recent Labs   Lab  01/18/19  1034 01/19/19  0814 01/20/19  0645   * 133* 136   K 3.7 3.0* 3.2*   CL 92* 98 99   CO2 20* 26 30*   BUN 9 2* 3*   CREATININE 1.0 0.8 0.6   * 208* 127*   CALCIUM 9.8 8.5* 8.7   MG  --  1.4* 2.1   PHOS  --  <1.0* 2.0*   LIPASE 445*  --   --      Recent Labs   Lab 01/18/19  1034 01/19/19  0814 01/20/19  0645   ALKPHOS 101 69 88   * 132* 147*   * 228* 257*   ALBUMIN 4.6 3.1* 3.2*   PROT 8.8* 6.2 6.5   BILITOT 2.7* 2.2* 1.4*      Procedures: none    Consultants:  Consults (From admission, onward)        Status Ordering Provider     Inpatient consult to Psychiatry  Once     Provider:  (Not yet assigned)    Completed BASILIA BUCKNER          Current Discharge Medication List      START taking these medications    Details   diazePAM (VALIUM) 2 MG tablet Take 1 tablet (2 mg total) by mouth 2 (two) times daily. for 1 day  Qty: 2 tablet, Refills: 0      folic acid-vit B6-vit B12 2.5-25-2 mg (FOLBIC OR EQUIV) 2.5-25-2 mg Tab Take 1 tablet by mouth once daily.  Qty: 30 tablet, Refills: 11      k phos di & mono-sod phos mono (K-PHOS-NEUTRAL) 250 mg Tab Take 2 tablets by mouth once daily. for 7 days  Qty: 14 tablet, Refills: 0      potassium, sodium phosphates (PHOS-NAK) 280-160-250 mg PwPk Take 2 packets by mouth 4 (four) times daily before meals and nightly.  Refills: 0      thiamine 100 MG tablet Take 1 tablet (100 mg total) by mouth once daily.  Qty: 30 tablet, Refills: 11         CONTINUE these medications which have NOT CHANGED    Details   ibuprofen (ADVIL,MOTRIN) 200 MG tablet Take 200 mg by mouth every 6 (six) hours as needed for Pain.         STOP taking these medications       chlordiazepoxide (LIBRIUM) 25 MG Cap Comments:   Reason for Stopping:         HYDROcodone-acetaminophen (NORCO) 7.5-325 mg per tablet Comments:   Reason for Stopping:         ondansetron (ZOFRAN) 4 MG tablet Comments:   Reason for Stopping:         ondansetron (ZOFRAN) 4 MG tablet Comments:   Reason for  "Stopping:               Discharge Diet:regular diet     Activity: activity as tolerated    Discharge Condition: Good    Disposition: {Disposition:924234717::"Home or Self Care"}    Tests pending at the time of discharge: none      Time spent  on the discharge of the patient including review of hospital course with the patient. reviewing discharge medications and arranging follow-up care 35 minutes    Discharge examination Patient was seen and examined on the date of discharge and determined to be suitable for discharge.    Discharge plan and follow up:    No future appointments.    Provider  Mariana Shin MD    I personally scribed for Mariana Shin MD on 01/20/2019 at 1:08 PM. Electronically signed by wild Hays on 01/20/2019 at 1:08 PM    "

## 2019-02-01 NOTE — DISCHARGE SUMMARY
Discharge Summary  Hospital Medicine    Attending Provider on Discharge: Mariana Shin MD  [facility] Hospital Medicine Team: [facility] MED D  Date of Admission:  1/18/2019     Date of Discharge:  1/20/2019  Length of Stay:  LOS: 2 days   Code status: Full Code    Active Hospital Problems    Diagnosis  POA    *Alcohol-induced acute pancreatitis [K85.20]  Yes    Acute alcoholic hepatitis [K70.10]  Yes    Alcohol dependence [F10.20]  Yes      Resolved Hospital Problems   No resolved problems to display.     HPI:   Pt is a 33 year old male with a history of alcoholic pancreatitis and EtOH use disorder who presents with abdominal pain. Started 3 days ago. Associated with nausea, bilious non-bloody vomiting, decreased appetite, and chills. Unable to keep down fluids or food. Pt is currently hungry. No fever. Has had bowel movements. Pt has had similar episodes in the past after drinking EtOH. Pt drinks 2 shots of whiskey daily. Has attempted to quit EtOH in the past, with a 3 month period of abstinence.    Hospital Course  Pt is a 33 year old male with a history of alcoholic pancreatitis and EtOH use disorder who is admitted for alcoholic pancreatitis. Amendable to rehab and counseling. Rapid improvement with IVF, bowel rest and analgesia.     Assessment and Plan for Problems addressed today:  Alcoholic pancreatitis  · Clear liquid diet  · Change NS to LR  · Pain management, zofran     Alcohol use disorder  History of DTs  · Valium 5 mg Q8h reduced to BID  · Provide counseling materials (pt receptive)  · Ativan IV prn CIWA >8 or abnormal vital signs  · Neuro checks Q4h  · IV thiamin/folate and folbic (started 1/19)  · Psychiatry consulted, outpt treatment options discussed (1/19). Adjunctive medical therapy held due to transaminitis. Will defer to outpatient therapy. Ref[facility]     Hypophosphatemia  Hypokalemia  Hypomagnesemia  · replacing        No results for input(s): POCTGLUCOSE in the last 168 hours.    Hemoglobin A1C   Date Value Ref Range Status   03/10/2018 4.5 4.0 - 5.6 % Final     Comment:     According to ADA guidelines, hemoglobin A1c <7.0% represents  optimal control in non-pregnant diabetic patients. Different  metrics may apply to specific patient populations.   Standards of Medical Care in Diabetes-2016.  For the purpose of screening for the presence of diabetes:  <5.7%     Consistent with the absence of diabetes  5.7-6.4%  Consistent with increasing risk for diabetes   (prediabetes)  >or=6.5%  Consistent with diabetes  Currently, no consensus exists for use of hemoglobin A1c  for diagnosis of diabetes for children.  This Hemoglobin A1c assay has significant interference with fetal   hemoglobin   (HbF). The results are invalid for patients with abnormal amounts of   HbF,   including those with known Hereditary Persistence   of Fetal Hemoglobin. Heterozygous hemoglobin variants (HbAS, HbAC,   HbAD, HbAE, HbA2) do not significantly interfere with this assay;   however, presence of multiple variants in a sample may impact the %   interference.       Procedures: none    Consultants: psychiatry    Discharge Medication List as of 1/20/2019  1:07 PM      START taking these medications    Details   diazePAM (VALIUM) 2 MG tablet Take 1 tablet (2 mg total) by mouth 2 (two) times daily. for 1 day, Starting Sun 1/20/2019, Until Mon 1/21/2019, Print      folic acid-vit B6-vit B12 2.5-25-2 mg (FOLBIC OR EQUIV) 2.5-25-2 mg Tab Take 1 tablet by mouth once daily., Starting Mon 1/21/2019, No Print      k phos di & mono-sod phos mono (K-PHOS-NEUTRAL) 250 mg Tab Take 2 tablets by mouth once daily. for 7 days, Starting Sun 1/20/2019, Until Sun 1/27/2019, Normal      potassium, sodium phosphates (PHOS-NAK) 280-160-250 mg PwPk Take 2 packets by mouth 4 (four) times daily before meals and nightly., Starting Sun 1/20/2019, OTC      thiamine 100 MG tablet Take 1 tablet (100 mg total) by mouth once daily., Starting Mon  1/21/2019, OTC         CONTINUE these medications which have NOT CHANGED    Details   ibuprofen (ADVIL,MOTRIN) 200 MG tablet Take 200 mg by mouth every 6 (six) hours as needed for Pain., Historical Med         STOP taking these medications       chlordiazepoxide (LIBRIUM) 25 MG Cap Comments:   Reason for Stopping:         HYDROcodone-acetaminophen (NORCO) 7.5-325 mg per tablet Comments:   Reason for Stopping:         ondansetron (ZOFRAN) 4 MG tablet Comments:   Reason for Stopping:         ondansetron (ZOFRAN) 4 MG tablet Comments:   Reason for Stopping:               Discharge Diet:regular diet with Normal Fluid intake of 1500 - 2000 mL per day    Activity: activity as tolerated    Discharge Condition: Good    Disposition: Home or Self Care    Tests pending at the time of discharge: none      Time spent  on the discharge of the patient including review of hospital course with the patient. reviewing discharge medications and arranging follow-up care 35 min    Discharge examination Patient was seen and examined on the date of discharge and determined to be suitable for discharge.    Discharge plan   Follow up at Fletcher Shin MD

## 2021-01-01 ENCOUNTER — HOSPITAL ENCOUNTER (INPATIENT)
Facility: HOSPITAL | Age: 36
LOS: 23 days | DRG: 870 | End: 2021-06-25
Attending: EMERGENCY MEDICINE | Admitting: INTERNAL MEDICINE

## 2021-01-01 VITALS
WEIGHT: 91.25 LBS | OXYGEN SATURATION: 95 % | RESPIRATION RATE: 16 BRPM | DIASTOLIC BLOOD PRESSURE: 57 MMHG | SYSTOLIC BLOOD PRESSURE: 97 MMHG | BODY MASS INDEX: 15.58 KG/M2 | HEIGHT: 64 IN | HEART RATE: 107 BPM | TEMPERATURE: 97 F

## 2021-01-01 DIAGNOSIS — Z76.82 LIVER TRANSPLANT CANDIDATE: ICD-10-CM

## 2021-01-01 DIAGNOSIS — R57.9 SHOCK: ICD-10-CM

## 2021-01-01 DIAGNOSIS — R05.9 COUGH: ICD-10-CM

## 2021-01-01 DIAGNOSIS — G93.40 ACUTE ENCEPHALOPATHY: ICD-10-CM

## 2021-01-01 DIAGNOSIS — E87.70 HYPERVOLEMIA, UNSPECIFIED HYPERVOLEMIA TYPE: ICD-10-CM

## 2021-01-01 DIAGNOSIS — A41.9 SEPSIS, DUE TO UNSPECIFIED ORGANISM, UNSPECIFIED WHETHER ACUTE ORGAN DYSFUNCTION PRESENT: ICD-10-CM

## 2021-01-01 DIAGNOSIS — J96.01 ACUTE HYPOXEMIC RESPIRATORY FAILURE: ICD-10-CM

## 2021-01-01 DIAGNOSIS — N17.9 AKI (ACUTE KIDNEY INJURY): ICD-10-CM

## 2021-01-01 DIAGNOSIS — K72.90 DECOMPENSATED HEPATIC CIRRHOSIS: ICD-10-CM

## 2021-01-01 DIAGNOSIS — R53.1 WEAKNESS: ICD-10-CM

## 2021-01-01 DIAGNOSIS — K74.60 DECOMPENSATED HEPATIC CIRRHOSIS: ICD-10-CM

## 2021-01-01 DIAGNOSIS — A41.9 SEPSIS: ICD-10-CM

## 2021-01-01 DIAGNOSIS — Z91.89 AT RISK FOR PROLONGED QT INTERVAL SYNDROME: ICD-10-CM

## 2021-01-01 DIAGNOSIS — R00.2 PALPITATIONS: ICD-10-CM

## 2021-01-01 DIAGNOSIS — K92.2 GASTROINTESTINAL HEMORRHAGE, UNSPECIFIED GASTROINTESTINAL HEMORRHAGE TYPE: Primary | ICD-10-CM

## 2021-01-01 LAB
1,3 BETA GLUCAN SER-MCNC: 46 PG/ML
25(OH)D3+25(OH)D2 SERPL-MCNC: 41 NG/ML (ref 30–96)
A1AT SERPL-MCNC: 209 MG/DL (ref 100–190)
ABO + RH BLD: NORMAL
ACANTHOCYTES BLD QL SMEAR: PRESENT
ACANTHOCYTES BLD QL SMEAR: PRESENT
ACID FAST MOD KINY STN SPEC: NORMAL
ALBUMIN FLD-MCNC: <0.3 G/DL
ALBUMIN FLD-MCNC: <0.3 G/DL
ALBUMIN SERPL BCP-MCNC: 1.4 G/DL (ref 3.5–5.2)
ALBUMIN SERPL BCP-MCNC: 1.5 G/DL (ref 3.5–5.2)
ALBUMIN SERPL BCP-MCNC: 1.6 G/DL (ref 3.5–5.2)
ALBUMIN SERPL BCP-MCNC: 1.7 G/DL (ref 3.5–5.2)
ALBUMIN SERPL BCP-MCNC: 1.8 G/DL (ref 3.5–5.2)
ALBUMIN SERPL BCP-MCNC: 2 G/DL (ref 3.5–5.2)
ALBUMIN SERPL BCP-MCNC: 2.1 G/DL (ref 3.5–5.2)
ALBUMIN SERPL BCP-MCNC: 2.1 G/DL (ref 3.5–5.2)
ALBUMIN SERPL BCP-MCNC: 2.2 G/DL (ref 3.5–5.2)
ALBUMIN SERPL BCP-MCNC: 2.2 G/DL (ref 3.5–5.2)
ALLENS TEST: ABNORMAL
ALP SERPL-CCNC: 183 U/L (ref 55–135)
ALP SERPL-CCNC: 192 U/L (ref 55–135)
ALP SERPL-CCNC: 201 U/L (ref 55–135)
ALP SERPL-CCNC: 204 U/L (ref 55–135)
ALP SERPL-CCNC: 206 U/L (ref 55–135)
ALP SERPL-CCNC: 213 U/L (ref 55–135)
ALP SERPL-CCNC: 214 U/L (ref 55–135)
ALP SERPL-CCNC: 214 U/L (ref 55–135)
ALP SERPL-CCNC: 217 U/L (ref 55–135)
ALP SERPL-CCNC: 218 U/L (ref 55–135)
ALP SERPL-CCNC: 220 U/L (ref 55–135)
ALP SERPL-CCNC: 224 U/L (ref 55–135)
ALP SERPL-CCNC: 230 U/L (ref 55–135)
ALP SERPL-CCNC: 232 U/L (ref 55–135)
ALP SERPL-CCNC: 238 U/L (ref 55–135)
ALP SERPL-CCNC: 238 U/L (ref 55–135)
ALP SERPL-CCNC: 240 U/L (ref 55–135)
ALP SERPL-CCNC: 241 U/L (ref 55–135)
ALP SERPL-CCNC: 266 U/L (ref 55–135)
ALP SERPL-CCNC: 272 U/L (ref 55–135)
ALP SERPL-CCNC: 277 U/L (ref 55–135)
ALP SERPL-CCNC: 279 U/L (ref 55–135)
ALP SERPL-CCNC: 326 U/L (ref 55–135)
ALP SERPL-CCNC: 338 U/L (ref 55–135)
ALP SERPL-CCNC: 348 U/L (ref 55–135)
ALP SERPL-CCNC: 350 U/L (ref 55–135)
ALT SERPL W/O P-5'-P-CCNC: 13 U/L (ref 10–44)
ALT SERPL W/O P-5'-P-CCNC: 14 U/L (ref 10–44)
ALT SERPL W/O P-5'-P-CCNC: 20 U/L (ref 10–44)
ALT SERPL W/O P-5'-P-CCNC: 22 U/L (ref 10–44)
ALT SERPL W/O P-5'-P-CCNC: 22 U/L (ref 10–44)
ALT SERPL W/O P-5'-P-CCNC: 23 U/L (ref 10–44)
ALT SERPL W/O P-5'-P-CCNC: 26 U/L (ref 10–44)
ALT SERPL W/O P-5'-P-CCNC: 27 U/L (ref 10–44)
ALT SERPL W/O P-5'-P-CCNC: 27 U/L (ref 10–44)
ALT SERPL W/O P-5'-P-CCNC: 28 U/L (ref 10–44)
ALT SERPL W/O P-5'-P-CCNC: 39 U/L (ref 10–44)
ALT SERPL W/O P-5'-P-CCNC: 51 U/L (ref 10–44)
ALT SERPL W/O P-5'-P-CCNC: 61 U/L (ref 10–44)
ALT SERPL W/O P-5'-P-CCNC: 62 U/L (ref 10–44)
ALT SERPL W/O P-5'-P-CCNC: 64 U/L (ref 10–44)
ALT SERPL W/O P-5'-P-CCNC: 64 U/L (ref 10–44)
ALT SERPL W/O P-5'-P-CCNC: 68 U/L (ref 10–44)
ALT SERPL W/O P-5'-P-CCNC: 7 U/L (ref 10–44)
ALT SERPL W/O P-5'-P-CCNC: 70 U/L (ref 10–44)
ALT SERPL W/O P-5'-P-CCNC: 73 U/L (ref 10–44)
ALT SERPL W/O P-5'-P-CCNC: 9 U/L (ref 10–44)
ALT SERPL W/O P-5'-P-CCNC: 9 U/L (ref 10–44)
AMMONIA PLAS-SCNC: 100 UMOL/L (ref 10–50)
AMMONIA PLAS-SCNC: 106 UMOL/L (ref 10–50)
AMMONIA PLAS-SCNC: 139 UMOL/L (ref 10–50)
AMMONIA PLAS-SCNC: 252 UMOL/L (ref 10–50)
AMMONIA PLAS-SCNC: 84 UMOL/L (ref 10–50)
AMPHET+METHAMPHET UR QL: NEGATIVE
ANA SER QL IF: NORMAL
ANION GAP SERPL CALC-SCNC: 10 MMOL/L (ref 8–16)
ANION GAP SERPL CALC-SCNC: 11 MMOL/L (ref 8–16)
ANION GAP SERPL CALC-SCNC: 12 MMOL/L (ref 8–16)
ANION GAP SERPL CALC-SCNC: 13 MMOL/L (ref 8–16)
ANION GAP SERPL CALC-SCNC: 14 MMOL/L (ref 8–16)
ANION GAP SERPL CALC-SCNC: 15 MMOL/L (ref 8–16)
ANION GAP SERPL CALC-SCNC: 16 MMOL/L (ref 8–16)
ANION GAP SERPL CALC-SCNC: 17 MMOL/L (ref 8–16)
ANION GAP SERPL CALC-SCNC: 17 MMOL/L (ref 8–16)
ANION GAP SERPL CALC-SCNC: 18 MMOL/L (ref 8–16)
ANION GAP SERPL CALC-SCNC: 20 MMOL/L (ref 8–16)
ANION GAP SERPL CALC-SCNC: 9 MMOL/L (ref 8–16)
ANION GAP SERPL CALC-SCNC: 9 MMOL/L (ref 8–16)
ANISOCYTOSIS BLD QL SMEAR: ABNORMAL
ANISOCYTOSIS BLD QL SMEAR: SLIGHT
APPEARANCE FLD: CLEAR
APPEARANCE FLD: NORMAL
APTT BLDCRRT: 63.5 SEC (ref 21–32)
ASCENDING AORTA: 2.23 CM
ASCENDING AORTA: 2.64 CM
AST SERPL-CCNC: 104 U/L (ref 10–40)
AST SERPL-CCNC: 106 U/L (ref 10–40)
AST SERPL-CCNC: 110 U/L (ref 10–40)
AST SERPL-CCNC: 113 U/L (ref 10–40)
AST SERPL-CCNC: 118 U/L (ref 10–40)
AST SERPL-CCNC: 123 U/L (ref 10–40)
AST SERPL-CCNC: 129 U/L (ref 10–40)
AST SERPL-CCNC: 132 U/L (ref 10–40)
AST SERPL-CCNC: 142 U/L (ref 10–40)
AST SERPL-CCNC: 151 U/L (ref 10–40)
AST SERPL-CCNC: 158 U/L (ref 10–40)
AST SERPL-CCNC: 186 U/L (ref 10–40)
AST SERPL-CCNC: 187 U/L (ref 10–40)
AST SERPL-CCNC: 194 U/L (ref 10–40)
AST SERPL-CCNC: 68 U/L (ref 10–40)
AST SERPL-CCNC: 72 U/L (ref 10–40)
AST SERPL-CCNC: 73 U/L (ref 10–40)
AST SERPL-CCNC: 77 U/L (ref 10–40)
AST SERPL-CCNC: 83 U/L (ref 10–40)
AST SERPL-CCNC: 83 U/L (ref 10–40)
AST SERPL-CCNC: 85 U/L (ref 10–40)
AST SERPL-CCNC: 85 U/L (ref 10–40)
AST SERPL-CCNC: 87 U/L (ref 10–40)
AST SERPL-CCNC: 87 U/L (ref 10–40)
AST SERPL-CCNC: 94 U/L (ref 10–40)
AST SERPL-CCNC: 94 U/L (ref 10–40)
AV INDEX (PROSTH): 0.93
AV MEAN GRADIENT: 5 MMHG
AV PEAK GRADIENT: 7 MMHG
AV VALVE AREA: 2.97 CM2
AV VELOCITY RATIO: 0.87
BACTERIA #/AREA URNS AUTO: ABNORMAL /HPF
BACTERIA #/AREA URNS AUTO: ABNORMAL /HPF
BACTERIA BLD CULT: NORMAL
BACTERIA SPEC AEROBE CULT: ABNORMAL
BACTERIA SPEC AEROBE CULT: ABNORMAL
BACTERIA SPEC AEROBE CULT: NO GROWTH
BACTERIA SPEC ANAEROBE CULT: NORMAL
BACTERIA SPEC ANAEROBE CULT: NORMAL
BACTERIA UR CULT: NO GROWTH
BARBITURATES UR QL SCN>200 NG/ML: NEGATIVE
BASO STIPL BLD QL SMEAR: ABNORMAL
BASOPHILS # BLD AUTO: 0.03 K/UL (ref 0–0.2)
BASOPHILS # BLD AUTO: 0.03 K/UL (ref 0–0.2)
BASOPHILS # BLD AUTO: 0.04 K/UL (ref 0–0.2)
BASOPHILS # BLD AUTO: 0.05 K/UL (ref 0–0.2)
BASOPHILS # BLD AUTO: 0.06 K/UL (ref 0–0.2)
BASOPHILS # BLD AUTO: 0.07 K/UL (ref 0–0.2)
BASOPHILS # BLD AUTO: 0.08 K/UL (ref 0–0.2)
BASOPHILS # BLD AUTO: 0.09 K/UL (ref 0–0.2)
BASOPHILS # BLD AUTO: 0.09 K/UL (ref 0–0.2)
BASOPHILS # BLD AUTO: 0.1 K/UL (ref 0–0.2)
BASOPHILS # BLD AUTO: 0.1 K/UL (ref 0–0.2)
BASOPHILS # BLD AUTO: 0.11 K/UL (ref 0–0.2)
BASOPHILS # BLD AUTO: 0.12 K/UL (ref 0–0.2)
BASOPHILS # BLD AUTO: 0.13 K/UL (ref 0–0.2)
BASOPHILS # BLD AUTO: 0.13 K/UL (ref 0–0.2)
BASOPHILS # BLD AUTO: 0.14 K/UL (ref 0–0.2)
BASOPHILS # BLD AUTO: 0.15 K/UL (ref 0–0.2)
BASOPHILS # BLD AUTO: 0.16 K/UL (ref 0–0.2)
BASOPHILS # BLD AUTO: 0.19 K/UL (ref 0–0.2)
BASOPHILS # BLD AUTO: 0.2 K/UL (ref 0–0.2)
BASOPHILS # BLD AUTO: ABNORMAL K/UL (ref 0–0.2)
BASOPHILS NFR BLD: 0 % (ref 0–1.9)
BASOPHILS NFR BLD: 0.1 % (ref 0–1.9)
BASOPHILS NFR BLD: 0.2 % (ref 0–1.9)
BASOPHILS NFR BLD: 0.3 % (ref 0–1.9)
BASOPHILS NFR BLD: 0.4 % (ref 0–1.9)
BASOPHILS NFR BLD: 0.4 % (ref 0–1.9)
BASOPHILS NFR BLD: 0.5 % (ref 0–1.9)
BASOPHILS NFR BLD: 0.6 % (ref 0–1.9)
BASOPHILS NFR BLD: 0.7 % (ref 0–1.9)
BASOPHILS NFR BLD: 0.9 % (ref 0–1.9)
BASOPHILS NFR BLD: 1 % (ref 0–1.9)
BENZODIAZ UR QL SCN>200 NG/ML: NORMAL
BILIRUB DIRECT SERPL-MCNC: >14 MG/DL (ref 0.1–0.3)
BILIRUB SERPL-MCNC: 16 MG/DL (ref 0.1–1)
BILIRUB SERPL-MCNC: 16.7 MG/DL (ref 0.1–1)
BILIRUB SERPL-MCNC: 17 MG/DL (ref 0.1–1)
BILIRUB SERPL-MCNC: 17.3 MG/DL (ref 0.1–1)
BILIRUB SERPL-MCNC: 20.8 MG/DL (ref 0.1–1)
BILIRUB SERPL-MCNC: 22 MG/DL (ref 0.1–1)
BILIRUB SERPL-MCNC: 23.6 MG/DL (ref 0.1–1)
BILIRUB SERPL-MCNC: 24.1 MG/DL (ref 0.1–1)
BILIRUB SERPL-MCNC: 24.4 MG/DL (ref 0.1–1)
BILIRUB SERPL-MCNC: 24.8 MG/DL (ref 0.1–1)
BILIRUB SERPL-MCNC: 25.3 MG/DL (ref 0.1–1)
BILIRUB SERPL-MCNC: 25.3 MG/DL (ref 0.1–1)
BILIRUB SERPL-MCNC: 25.5 MG/DL (ref 0.1–1)
BILIRUB SERPL-MCNC: 26 MG/DL (ref 0.1–1)
BILIRUB SERPL-MCNC: 26.1 MG/DL (ref 0.1–1)
BILIRUB SERPL-MCNC: 26.2 MG/DL (ref 0.1–1)
BILIRUB SERPL-MCNC: 26.3 MG/DL (ref 0.1–1)
BILIRUB SERPL-MCNC: 26.8 MG/DL (ref 0.1–1)
BILIRUB SERPL-MCNC: 27.3 MG/DL (ref 0.1–1)
BILIRUB SERPL-MCNC: 27.7 MG/DL (ref 0.1–1)
BILIRUB SERPL-MCNC: 28.3 MG/DL (ref 0.1–1)
BILIRUB SERPL-MCNC: 28.3 MG/DL (ref 0.1–1)
BILIRUB SERPL-MCNC: 28.7 MG/DL (ref 0.1–1)
BILIRUB SERPL-MCNC: 29.1 MG/DL (ref 0.1–1)
BILIRUB SERPL-MCNC: 29.8 MG/DL (ref 0.1–1)
BILIRUB SERPL-MCNC: 30.6 MG/DL (ref 0.1–1)
BILIRUB UR QL STRIP: ABNORMAL
BILIRUB UR QL STRIP: ABNORMAL
BILIRUB UR QL STRIP: NEGATIVE
BLD GP AB SCN CELLS X3 SERPL QL: NORMAL
BLD PROD TYP BPU: NORMAL
BLD PROD TYP BPU: NORMAL
BLOOD UNIT EXPIRATION DATE: NORMAL
BLOOD UNIT EXPIRATION DATE: NORMAL
BLOOD UNIT TYPE CODE: 5100
BLOOD UNIT TYPE CODE: 5100
BLOOD UNIT TYPE: NORMAL
BLOOD UNIT TYPE: NORMAL
BNP SERPL-MCNC: 81 PG/ML (ref 0–99)
BODY FLD TYPE: NORMAL
BODY FLD TYPE: NORMAL
BODY FLUID SOURCE, LDH: NORMAL
BODY FLUID SOURCE, LDH: NORMAL
BSA FOR ECHO PROCEDURE: 1.49 M2
BSA FOR ECHO PROCEDURE: 1.49 M2
BUN SERPL-MCNC: 11 MG/DL (ref 6–20)
BUN SERPL-MCNC: 12 MG/DL (ref 6–20)
BUN SERPL-MCNC: 13 MG/DL (ref 6–20)
BUN SERPL-MCNC: 16 MG/DL (ref 6–20)
BUN SERPL-MCNC: 17 MG/DL (ref 6–20)
BUN SERPL-MCNC: 19 MG/DL (ref 6–20)
BUN SERPL-MCNC: 2 MG/DL (ref 6–20)
BUN SERPL-MCNC: 2 MG/DL (ref 6–20)
BUN SERPL-MCNC: 24 MG/DL (ref 6–20)
BUN SERPL-MCNC: 26 MG/DL (ref 6–20)
BUN SERPL-MCNC: 28 MG/DL (ref 6–20)
BUN SERPL-MCNC: 29 MG/DL (ref 6–20)
BUN SERPL-MCNC: 3 MG/DL (ref 6–20)
BUN SERPL-MCNC: 3 MG/DL (ref 6–20)
BUN SERPL-MCNC: 30 MG/DL (ref 6–20)
BUN SERPL-MCNC: 31 MG/DL (ref 6–20)
BUN SERPL-MCNC: 32 MG/DL (ref 6–20)
BUN SERPL-MCNC: 33 MG/DL (ref 6–20)
BUN SERPL-MCNC: 34 MG/DL (ref 6–20)
BUN SERPL-MCNC: 35 MG/DL (ref 6–20)
BUN SERPL-MCNC: 39 MG/DL (ref 6–20)
BUN SERPL-MCNC: 39 MG/DL (ref 6–20)
BUN SERPL-MCNC: 4 MG/DL (ref 6–20)
BUN SERPL-MCNC: 40 MG/DL (ref 6–20)
BUN SERPL-MCNC: 40 MG/DL (ref 6–20)
BUN SERPL-MCNC: 41 MG/DL (ref 6–20)
BUN SERPL-MCNC: 42 MG/DL (ref 6–20)
BUN SERPL-MCNC: 43 MG/DL (ref 6–20)
BUN SERPL-MCNC: 45 MG/DL (ref 6–20)
BUN SERPL-MCNC: 45 MG/DL (ref 6–20)
BUN SERPL-MCNC: 5 MG/DL (ref 6–20)
BUN SERPL-MCNC: 5 MG/DL (ref 6–30)
BUN SERPL-MCNC: 54 MG/DL (ref 6–20)
BUN SERPL-MCNC: 54 MG/DL (ref 6–20)
BUN SERPL-MCNC: 6 MG/DL (ref 6–20)
BUN SERPL-MCNC: 62 MG/DL (ref 6–20)
BUN SERPL-MCNC: 67 MG/DL (ref 6–20)
BUN SERPL-MCNC: 67 MG/DL (ref 6–20)
BUN SERPL-MCNC: 69 MG/DL (ref 6–20)
BUN SERPL-MCNC: 7 MG/DL (ref 6–20)
BUN SERPL-MCNC: 7 MG/DL (ref 6–20)
BUN SERPL-MCNC: 75 MG/DL (ref 6–20)
BUN SERPL-MCNC: 8 MG/DL (ref 6–20)
BUN SERPL-MCNC: 8 MG/DL (ref 6–20)
BUN SERPL-MCNC: 88 MG/DL (ref 6–20)
BUN SERPL-MCNC: 88 MG/DL (ref 6–20)
BUN SERPL-MCNC: 9 MG/DL (ref 6–20)
BUN SERPL-MCNC: 97 MG/DL (ref 6–20)
BUN SERPL-MCNC: <2 MG/DL (ref 6–20)
BUN SERPL-MCNC: <2 MG/DL (ref 6–20)
BURR CELLS BLD QL SMEAR: ABNORMAL
BZE UR QL SCN: NEGATIVE
C DIFF GDH STL QL: NEGATIVE
C DIFF GDH STL QL: NEGATIVE
C DIFF TOX A+B STL QL IA: NEGATIVE
C DIFF TOX A+B STL QL IA: NEGATIVE
CA-I BLDV-SCNC: 0.63 MMOL/L (ref 1.06–1.42)
CA-I BLDV-SCNC: 0.87 MMOL/L (ref 1.06–1.42)
CA-I BLDV-SCNC: 0.9 MMOL/L (ref 1.06–1.42)
CA-I BLDV-SCNC: 0.94 MMOL/L (ref 1.06–1.42)
CA-I BLDV-SCNC: 1.01 MMOL/L (ref 1.06–1.42)
CA-I BLDV-SCNC: 1.01 MMOL/L (ref 1.06–1.42)
CA-I BLDV-SCNC: 1.02 MMOL/L (ref 1.06–1.42)
CA-I BLDV-SCNC: 1.03 MMOL/L (ref 1.06–1.42)
CA-I BLDV-SCNC: 1.04 MMOL/L (ref 1.06–1.42)
CA-I BLDV-SCNC: 1.06 MMOL/L (ref 1.06–1.42)
CA-I BLDV-SCNC: 1.08 MMOL/L (ref 1.06–1.42)
CA-I BLDV-SCNC: 1.09 MMOL/L (ref 1.06–1.42)
CA-I BLDV-SCNC: 1.1 MMOL/L (ref 1.06–1.42)
CA-I BLDV-SCNC: 1.11 MMOL/L (ref 1.06–1.42)
CA-I BLDV-SCNC: 1.12 MMOL/L (ref 1.06–1.42)
CA-I BLDV-SCNC: 1.12 MMOL/L (ref 1.06–1.42)
CA-I BLDV-SCNC: 1.13 MMOL/L (ref 1.06–1.42)
CA-I BLDV-SCNC: 1.14 MMOL/L (ref 1.06–1.42)
CA-I BLDV-SCNC: 1.15 MMOL/L (ref 1.06–1.42)
CA-I BLDV-SCNC: 1.16 MMOL/L (ref 1.06–1.42)
CA-I BLDV-SCNC: 1.17 MMOL/L (ref 1.06–1.42)
CA-I BLDV-SCNC: 1.18 MMOL/L (ref 1.06–1.42)
CA-I BLDV-SCNC: 1.2 MMOL/L (ref 1.06–1.42)
CA-I BLDV-SCNC: 1.22 MMOL/L (ref 1.06–1.42)
CA-I BLDV-SCNC: 1.23 MMOL/L (ref 1.06–1.42)
CA-I BLDV-SCNC: 1.24 MMOL/L (ref 1.06–1.42)
CA-I BLDV-SCNC: 1.24 MMOL/L (ref 1.06–1.42)
CA-I BLDV-SCNC: 1.26 MMOL/L (ref 1.06–1.42)
CALCIUM SERPL-MCNC: 10 MG/DL (ref 8.7–10.5)
CALCIUM SERPL-MCNC: 10 MG/DL (ref 8.7–10.5)
CALCIUM SERPL-MCNC: 6.6 MG/DL (ref 8.7–10.5)
CALCIUM SERPL-MCNC: 6.7 MG/DL (ref 8.7–10.5)
CALCIUM SERPL-MCNC: 6.7 MG/DL (ref 8.7–10.5)
CALCIUM SERPL-MCNC: 6.9 MG/DL (ref 8.7–10.5)
CALCIUM SERPL-MCNC: 7 MG/DL (ref 8.7–10.5)
CALCIUM SERPL-MCNC: 7.2 MG/DL (ref 8.7–10.5)
CALCIUM SERPL-MCNC: 7.3 MG/DL (ref 8.7–10.5)
CALCIUM SERPL-MCNC: 7.4 MG/DL (ref 8.7–10.5)
CALCIUM SERPL-MCNC: 7.6 MG/DL (ref 8.7–10.5)
CALCIUM SERPL-MCNC: 7.6 MG/DL (ref 8.7–10.5)
CALCIUM SERPL-MCNC: 7.7 MG/DL (ref 8.7–10.5)
CALCIUM SERPL-MCNC: 7.9 MG/DL (ref 8.7–10.5)
CALCIUM SERPL-MCNC: 8 MG/DL (ref 8.7–10.5)
CALCIUM SERPL-MCNC: 8.1 MG/DL (ref 8.7–10.5)
CALCIUM SERPL-MCNC: 8.2 MG/DL (ref 8.7–10.5)
CALCIUM SERPL-MCNC: 8.3 MG/DL (ref 8.7–10.5)
CALCIUM SERPL-MCNC: 8.4 MG/DL (ref 8.7–10.5)
CALCIUM SERPL-MCNC: 8.4 MG/DL (ref 8.7–10.5)
CALCIUM SERPL-MCNC: 8.5 MG/DL (ref 8.7–10.5)
CALCIUM SERPL-MCNC: 8.6 MG/DL (ref 8.7–10.5)
CALCIUM SERPL-MCNC: 8.7 MG/DL (ref 8.7–10.5)
CALCIUM SERPL-MCNC: 8.8 MG/DL (ref 8.7–10.5)
CALCIUM SERPL-MCNC: 8.8 MG/DL (ref 8.7–10.5)
CALCIUM SERPL-MCNC: 8.9 MG/DL (ref 8.7–10.5)
CALCIUM SERPL-MCNC: 9 MG/DL (ref 8.7–10.5)
CALCIUM SERPL-MCNC: 9.1 MG/DL (ref 8.7–10.5)
CALCIUM SERPL-MCNC: 9.2 MG/DL (ref 8.7–10.5)
CALCIUM SERPL-MCNC: 9.3 MG/DL (ref 8.7–10.5)
CALCIUM SERPL-MCNC: 9.4 MG/DL (ref 8.7–10.5)
CALCIUM SERPL-MCNC: 9.6 MG/DL (ref 8.7–10.5)
CALCIUM SERPL-MCNC: 9.7 MG/DL (ref 8.7–10.5)
CALCIUM SERPL-MCNC: 9.8 MG/DL (ref 8.7–10.5)
CALCIUM SERPL-MCNC: 9.8 MG/DL (ref 8.7–10.5)
CANNABINOIDS UR QL SCN: NEGATIVE
CERULOPLASMIN SERPL-MCNC: 24 MG/DL (ref 15–45)
CHLORIDE SERPL-SCNC: 100 MMOL/L (ref 95–110)
CHLORIDE SERPL-SCNC: 100 MMOL/L (ref 95–110)
CHLORIDE SERPL-SCNC: 101 MMOL/L (ref 95–110)
CHLORIDE SERPL-SCNC: 102 MMOL/L (ref 95–110)
CHLORIDE SERPL-SCNC: 103 MMOL/L (ref 95–110)
CHLORIDE SERPL-SCNC: 104 MMOL/L (ref 95–110)
CHLORIDE SERPL-SCNC: 105 MMOL/L (ref 95–110)
CHLORIDE SERPL-SCNC: 106 MMOL/L (ref 95–110)
CHLORIDE SERPL-SCNC: 107 MMOL/L (ref 95–110)
CHLORIDE SERPL-SCNC: 108 MMOL/L (ref 95–110)
CHLORIDE SERPL-SCNC: 109 MMOL/L (ref 95–110)
CHLORIDE SERPL-SCNC: 110 MMOL/L (ref 95–110)
CHLORIDE SERPL-SCNC: 111 MMOL/L (ref 95–110)
CHLORIDE SERPL-SCNC: 112 MMOL/L (ref 95–110)
CHLORIDE SERPL-SCNC: 97 MMOL/L (ref 95–110)
CHLORIDE SERPL-SCNC: 98 MMOL/L (ref 95–110)
CHLORIDE SERPL-SCNC: 98 MMOL/L (ref 95–110)
CHLORIDE SERPL-SCNC: 99 MMOL/L (ref 95–110)
CLARITY UR REFRACT.AUTO: ABNORMAL
CLARITY UR REFRACT.AUTO: CLEAR
CLARITY UR REFRACT.AUTO: CLEAR
CMV DNA SERPL NAA+PROBE-ACNC: 39 IU/ML
CO2 SERPL-SCNC: 13 MMOL/L (ref 23–29)
CO2 SERPL-SCNC: 14 MMOL/L (ref 23–29)
CO2 SERPL-SCNC: 15 MMOL/L (ref 23–29)
CO2 SERPL-SCNC: 16 MMOL/L (ref 23–29)
CO2 SERPL-SCNC: 17 MMOL/L (ref 23–29)
CO2 SERPL-SCNC: 18 MMOL/L (ref 23–29)
CO2 SERPL-SCNC: 19 MMOL/L (ref 23–29)
CO2 SERPL-SCNC: 20 MMOL/L (ref 23–29)
CO2 SERPL-SCNC: 21 MMOL/L (ref 23–29)
CO2 SERPL-SCNC: 22 MMOL/L (ref 23–29)
CO2 SERPL-SCNC: 23 MMOL/L (ref 23–29)
CO2 SERPL-SCNC: 24 MMOL/L (ref 23–29)
CO2 SERPL-SCNC: 24 MMOL/L (ref 23–29)
CO2 SERPL-SCNC: 25 MMOL/L (ref 23–29)
CO2 SERPL-SCNC: 27 MMOL/L (ref 23–29)
CO2 SERPL-SCNC: 28 MMOL/L (ref 23–29)
CODING SYSTEM: NORMAL
CODING SYSTEM: NORMAL
COLOR FLD: YELLOW
COLOR FLD: YELLOW
COLOR UR AUTO: ABNORMAL
COLOR UR AUTO: ABNORMAL
COLOR UR AUTO: YELLOW
COPPER SERPL-MCNC: 736 UG/L (ref 665–1480)
COPPER SERPL-MCNC: 945 UG/L (ref 665–1480)
CREAT SERPL-MCNC: 0.5 MG/DL (ref 0.5–1.4)
CREAT SERPL-MCNC: 0.6 MG/DL (ref 0.5–1.4)
CREAT SERPL-MCNC: 0.6 MG/DL (ref 0.5–1.4)
CREAT SERPL-MCNC: 0.7 MG/DL (ref 0.5–1.4)
CREAT SERPL-MCNC: 0.8 MG/DL (ref 0.5–1.4)
CREAT SERPL-MCNC: 0.9 MG/DL (ref 0.5–1.4)
CREAT SERPL-MCNC: 1 MG/DL (ref 0.5–1.4)
CREAT SERPL-MCNC: 1.1 MG/DL (ref 0.5–1.4)
CREAT SERPL-MCNC: 1.2 MG/DL (ref 0.5–1.4)
CREAT SERPL-MCNC: 1.3 MG/DL (ref 0.5–1.4)
CREAT SERPL-MCNC: 1.3 MG/DL (ref 0.5–1.4)
CREAT SERPL-MCNC: 1.4 MG/DL (ref 0.5–1.4)
CREAT SERPL-MCNC: 1.5 MG/DL (ref 0.5–1.4)
CREAT SERPL-MCNC: 1.6 MG/DL (ref 0.5–1.4)
CREAT SERPL-MCNC: 1.8 MG/DL (ref 0.5–1.4)
CREAT SERPL-MCNC: 1.8 MG/DL (ref 0.5–1.4)
CREAT SERPL-MCNC: 1.9 MG/DL (ref 0.5–1.4)
CREAT SERPL-MCNC: 2 MG/DL (ref 0.5–1.4)
CREAT SERPL-MCNC: 2.1 MG/DL (ref 0.5–1.4)
CREAT SERPL-MCNC: 2.2 MG/DL (ref 0.5–1.4)
CREAT SERPL-MCNC: 2.3 MG/DL (ref 0.5–1.4)
CREAT SERPL-MCNC: 2.5 MG/DL (ref 0.5–1.4)
CREAT SERPL-MCNC: 2.6 MG/DL (ref 0.5–1.4)
CREAT SERPL-MCNC: 2.8 MG/DL (ref 0.5–1.4)
CREAT SERPL-MCNC: 3 MG/DL (ref 0.5–1.4)
CREAT SERPL-MCNC: 3.4 MG/DL (ref 0.5–1.4)
CREAT UR-MCNC: 115 MG/DL (ref 23–375)
CREAT UR-MCNC: 115 MG/DL (ref 23–375)
CREAT UR-MCNC: 186 MG/DL (ref 23–375)
CTP QC/QA: YES
CV ECHO LV RWT: 0.31 CM
CV ECHO LV RWT: 0.36 CM
D DIMER PPP IA.FEU-MCNC: 16.01 MG/L FEU
DACRYOCYTES BLD QL SMEAR: ABNORMAL
DAT IGG-SP REAG RBC-IMP: NORMAL
DELSYS: ABNORMAL
DIFFERENTIAL METHOD: ABNORMAL
DISPENSE STATUS: NORMAL
DISPENSE STATUS: NORMAL
DOHLE BOD BLD QL SMEAR: PRESENT
DOP CALC AO PEAK VEL: 1.35 M/S
DOP CALC AO VTI: 20.93 CM
DOP CALC LVOT AREA: 3.2 CM2
DOP CALC LVOT AREA: 3.3 CM2
DOP CALC LVOT DIAMETER: 2.02 CM
DOP CALC LVOT DIAMETER: 2.04 CM
DOP CALC LVOT PEAK VEL: 1.18 M/S
DOP CALC LVOT STROKE VOLUME: 62.17 CM3
DOP CALCLVOT PEAK VEL VTI: 19.41 CM
EBV DNA SERPL NAA+PROBE-ACNC: 144 IU/ML
ECHO LV POSTERIOR WALL: 0.62 CM (ref 0.6–1.1)
ECHO LV POSTERIOR WALL: 0.77 CM (ref 0.6–1.1)
EJECTION FRACTION: 65 %
EJECTION FRACTION: 65 %
ENTEROVIRUS/RHINOVIRUS: NOT DETECTED
EOSINOPHIL # BLD AUTO: 0 K/UL (ref 0–0.5)
EOSINOPHIL # BLD AUTO: 0.1 K/UL (ref 0–0.5)
EOSINOPHIL # BLD AUTO: 0.2 K/UL (ref 0–0.5)
EOSINOPHIL # BLD AUTO: 0.3 K/UL (ref 0–0.5)
EOSINOPHIL # BLD AUTO: 0.4 K/UL (ref 0–0.5)
EOSINOPHIL # BLD AUTO: 0.5 K/UL (ref 0–0.5)
EOSINOPHIL # BLD AUTO: 0.5 K/UL (ref 0–0.5)
EOSINOPHIL # BLD AUTO: 0.6 K/UL (ref 0–0.5)
EOSINOPHIL # BLD AUTO: 0.6 K/UL (ref 0–0.5)
EOSINOPHIL # BLD AUTO: 0.7 K/UL (ref 0–0.5)
EOSINOPHIL # BLD AUTO: 0.8 K/UL (ref 0–0.5)
EOSINOPHIL # BLD AUTO: 0.8 K/UL (ref 0–0.5)
EOSINOPHIL # BLD AUTO: 0.9 K/UL (ref 0–0.5)
EOSINOPHIL # BLD AUTO: 0.9 K/UL (ref 0–0.5)
EOSINOPHIL # BLD AUTO: 1.2 K/UL (ref 0–0.5)
EOSINOPHIL # BLD AUTO: 1.2 K/UL (ref 0–0.5)
EOSINOPHIL # BLD AUTO: 1.4 K/UL (ref 0–0.5)
EOSINOPHIL # BLD AUTO: 1.6 K/UL (ref 0–0.5)
EOSINOPHIL # BLD AUTO: ABNORMAL K/UL (ref 0–0.5)
EOSINOPHIL NFR BLD: 0 % (ref 0–8)
EOSINOPHIL NFR BLD: 0.1 % (ref 0–8)
EOSINOPHIL NFR BLD: 0.2 % (ref 0–8)
EOSINOPHIL NFR BLD: 0.2 % (ref 0–8)
EOSINOPHIL NFR BLD: 0.3 % (ref 0–8)
EOSINOPHIL NFR BLD: 0.3 % (ref 0–8)
EOSINOPHIL NFR BLD: 0.5 % (ref 0–8)
EOSINOPHIL NFR BLD: 0.7 % (ref 0–8)
EOSINOPHIL NFR BLD: 0.9 % (ref 0–8)
EOSINOPHIL NFR BLD: 0.9 % (ref 0–8)
EOSINOPHIL NFR BLD: 1 % (ref 0–8)
EOSINOPHIL NFR BLD: 1.1 % (ref 0–8)
EOSINOPHIL NFR BLD: 1.2 % (ref 0–8)
EOSINOPHIL NFR BLD: 1.3 % (ref 0–8)
EOSINOPHIL NFR BLD: 1.4 % (ref 0–8)
EOSINOPHIL NFR BLD: 1.5 % (ref 0–8)
EOSINOPHIL NFR BLD: 1.7 % (ref 0–8)
EOSINOPHIL NFR BLD: 1.7 % (ref 0–8)
EOSINOPHIL NFR BLD: 1.8 % (ref 0–8)
EOSINOPHIL NFR BLD: 1.9 % (ref 0–8)
EOSINOPHIL NFR BLD: 2 % (ref 0–8)
EOSINOPHIL NFR BLD: 2.1 % (ref 0–8)
EOSINOPHIL NFR BLD: 2.1 % (ref 0–8)
EOSINOPHIL NFR BLD: 2.2 % (ref 0–8)
EOSINOPHIL NFR BLD: 2.6 % (ref 0–8)
EOSINOPHIL NFR BLD: 3 % (ref 0–8)
EOSINOPHIL NFR BLD: 3 % (ref 0–8)
EOSINOPHIL NFR BLD: 3.4 % (ref 0–8)
EOSINOPHIL NFR BLD: 3.5 % (ref 0–8)
EOSINOPHIL NFR BLD: 3.6 % (ref 0–8)
EOSINOPHIL NFR BLD: 3.8 % (ref 0–8)
EOSINOPHIL NFR BLD: 5 % (ref 0–8)
EOSINOPHIL NFR BLD: 5.3 % (ref 0–8)
EOSINOPHIL NFR BLD: 7 % (ref 0–8)
EOSINOPHIL NFR FLD MANUAL: 1 %
ERYTHROCYTE [DISTWIDTH] IN BLOOD BY AUTOMATED COUNT: 16.8 % (ref 11.5–14.5)
ERYTHROCYTE [DISTWIDTH] IN BLOOD BY AUTOMATED COUNT: 16.9 % (ref 11.5–14.5)
ERYTHROCYTE [DISTWIDTH] IN BLOOD BY AUTOMATED COUNT: 17 % (ref 11.5–14.5)
ERYTHROCYTE [DISTWIDTH] IN BLOOD BY AUTOMATED COUNT: 17.1 % (ref 11.5–14.5)
ERYTHROCYTE [DISTWIDTH] IN BLOOD BY AUTOMATED COUNT: 17.2 % (ref 11.5–14.5)
ERYTHROCYTE [DISTWIDTH] IN BLOOD BY AUTOMATED COUNT: 17.3 % (ref 11.5–14.5)
ERYTHROCYTE [DISTWIDTH] IN BLOOD BY AUTOMATED COUNT: 17.3 % (ref 11.5–14.5)
ERYTHROCYTE [DISTWIDTH] IN BLOOD BY AUTOMATED COUNT: 17.4 % (ref 11.5–14.5)
ERYTHROCYTE [DISTWIDTH] IN BLOOD BY AUTOMATED COUNT: 17.5 % (ref 11.5–14.5)
ERYTHROCYTE [DISTWIDTH] IN BLOOD BY AUTOMATED COUNT: 17.6 % (ref 11.5–14.5)
ERYTHROCYTE [DISTWIDTH] IN BLOOD BY AUTOMATED COUNT: 17.7 % (ref 11.5–14.5)
ERYTHROCYTE [DISTWIDTH] IN BLOOD BY AUTOMATED COUNT: 17.8 % (ref 11.5–14.5)
ERYTHROCYTE [DISTWIDTH] IN BLOOD BY AUTOMATED COUNT: 18 % (ref 11.5–14.5)
ERYTHROCYTE [DISTWIDTH] IN BLOOD BY AUTOMATED COUNT: 18.1 % (ref 11.5–14.5)
ERYTHROCYTE [DISTWIDTH] IN BLOOD BY AUTOMATED COUNT: 18.3 % (ref 11.5–14.5)
ERYTHROCYTE [DISTWIDTH] IN BLOOD BY AUTOMATED COUNT: 22.5 % (ref 11.5–14.5)
ERYTHROCYTE [DISTWIDTH] IN BLOOD BY AUTOMATED COUNT: 23.3 % (ref 11.5–14.5)
ERYTHROCYTE [DISTWIDTH] IN BLOOD BY AUTOMATED COUNT: 24.2 % (ref 11.5–14.5)
ERYTHROCYTE [DISTWIDTH] IN BLOOD BY AUTOMATED COUNT: 24.3 % (ref 11.5–14.5)
ERYTHROCYTE [DISTWIDTH] IN BLOOD BY AUTOMATED COUNT: 24.8 % (ref 11.5–14.5)
ERYTHROCYTE [DISTWIDTH] IN BLOOD BY AUTOMATED COUNT: 25 % (ref 11.5–14.5)
ERYTHROCYTE [DISTWIDTH] IN BLOOD BY AUTOMATED COUNT: 25.1 % (ref 11.5–14.5)
ERYTHROCYTE [DISTWIDTH] IN BLOOD BY AUTOMATED COUNT: 25.2 % (ref 11.5–14.5)
ERYTHROCYTE [DISTWIDTH] IN BLOOD BY AUTOMATED COUNT: 25.4 % (ref 11.5–14.5)
ERYTHROCYTE [DISTWIDTH] IN BLOOD BY AUTOMATED COUNT: 25.5 % (ref 11.5–14.5)
ERYTHROCYTE [DISTWIDTH] IN BLOOD BY AUTOMATED COUNT: 25.6 % (ref 11.5–14.5)
ERYTHROCYTE [DISTWIDTH] IN BLOOD BY AUTOMATED COUNT: 25.6 % (ref 11.5–14.5)
ERYTHROCYTE [DISTWIDTH] IN BLOOD BY AUTOMATED COUNT: 25.7 % (ref 11.5–14.5)
ERYTHROCYTE [DISTWIDTH] IN BLOOD BY AUTOMATED COUNT: 25.7 % (ref 11.5–14.5)
ERYTHROCYTE [DISTWIDTH] IN BLOOD BY AUTOMATED COUNT: 25.9 % (ref 11.5–14.5)
ERYTHROCYTE [DISTWIDTH] IN BLOOD BY AUTOMATED COUNT: 26 % (ref 11.5–14.5)
ERYTHROCYTE [DISTWIDTH] IN BLOOD BY AUTOMATED COUNT: 26.1 % (ref 11.5–14.5)
ERYTHROCYTE [DISTWIDTH] IN BLOOD BY AUTOMATED COUNT: 26.2 % (ref 11.5–14.5)
ERYTHROCYTE [SEDIMENTATION RATE] IN BLOOD BY WESTERGREN METHOD: 16 MM/H
ERYTHROCYTE [SEDIMENTATION RATE] IN BLOOD BY WESTERGREN METHOD: 20 MM/H
ERYTHROCYTE [SEDIMENTATION RATE] IN BLOOD BY WESTERGREN METHOD: 20 MM/H
ERYTHROCYTE [SEDIMENTATION RATE] IN BLOOD BY WESTERGREN METHOD: 22 MM/H
ERYTHROCYTE [SEDIMENTATION RATE] IN BLOOD BY WESTERGREN METHOD: 24 MM/H
ERYTHROCYTE [SEDIMENTATION RATE] IN BLOOD BY WESTERGREN METHOD: 24 MM/H
ERYTHROCYTE [SEDIMENTATION RATE] IN BLOOD BY WESTERGREN METHOD: 25 MM/H
EST. GFR  (AFRICAN AMERICAN): 25 ML/MIN/1.73 M^2
EST. GFR  (AFRICAN AMERICAN): 29.1 ML/MIN/1.73 M^2
EST. GFR  (AFRICAN AMERICAN): 31.6 ML/MIN/1.73 M^2
EST. GFR  (AFRICAN AMERICAN): 34.6 ML/MIN/1.73 M^2
EST. GFR  (AFRICAN AMERICAN): 36.3 ML/MIN/1.73 M^2
EST. GFR  (AFRICAN AMERICAN): 40.1 ML/MIN/1.73 M^2
EST. GFR  (AFRICAN AMERICAN): 42.4 ML/MIN/1.73 M^2
EST. GFR  (AFRICAN AMERICAN): 44.8 ML/MIN/1.73 M^2
EST. GFR  (AFRICAN AMERICAN): 47.5 ML/MIN/1.73 M^2
EST. GFR  (AFRICAN AMERICAN): 50.6 ML/MIN/1.73 M^2
EST. GFR  (AFRICAN AMERICAN): 54 ML/MIN/1.73 M^2
EST. GFR  (AFRICAN AMERICAN): 54 ML/MIN/1.73 M^2
EST. GFR  (AFRICAN AMERICAN): >60 ML/MIN/1.73 M^2
EST. GFR  (NON AFRICAN AMERICAN): 21.6 ML/MIN/1.73 M^2
EST. GFR  (NON AFRICAN AMERICAN): 25.2 ML/MIN/1.73 M^2
EST. GFR  (NON AFRICAN AMERICAN): 27.4 ML/MIN/1.73 M^2
EST. GFR  (NON AFRICAN AMERICAN): 29.9 ML/MIN/1.73 M^2
EST. GFR  (NON AFRICAN AMERICAN): 31.4 ML/MIN/1.73 M^2
EST. GFR  (NON AFRICAN AMERICAN): 34.7 ML/MIN/1.73 M^2
EST. GFR  (NON AFRICAN AMERICAN): 36.6 ML/MIN/1.73 M^2
EST. GFR  (NON AFRICAN AMERICAN): 38.8 ML/MIN/1.73 M^2
EST. GFR  (NON AFRICAN AMERICAN): 41.1 ML/MIN/1.73 M^2
EST. GFR  (NON AFRICAN AMERICAN): 43.7 ML/MIN/1.73 M^2
EST. GFR  (NON AFRICAN AMERICAN): 46.7 ML/MIN/1.73 M^2
EST. GFR  (NON AFRICAN AMERICAN): 46.7 ML/MIN/1.73 M^2
EST. GFR  (NON AFRICAN AMERICAN): 53.9 ML/MIN/1.73 M^2
EST. GFR  (NON AFRICAN AMERICAN): 58.2 ML/MIN/1.73 M^2
EST. GFR  (NON AFRICAN AMERICAN): >60 ML/MIN/1.73 M^2
ETHANOL SERPL-MCNC: 79 MG/DL
ETHANOL UR-MCNC: <10 MG/DL
FERRITIN SERPL-MCNC: 2023 NG/ML (ref 20–300)
FIBRINOGEN PPP-MCNC: 161 MG/DL (ref 182–400)
FIBRINOGEN PPP-MCNC: 168 MG/DL (ref 182–400)
FIBRINOGEN PPP-MCNC: 197 MG/DL (ref 182–400)
FIO2: 100
FIO2: 28
FIO2: 36
FIO2: 40
FIO2: 50
FIO2: 55
FIO2: 60
FIO2: 60
FLOW: 1
FLOW: 2
FLOW: 4
FLOW: 5
FOLATE SERPL-MCNC: 23.5 NG/ML (ref 4–24)
FRACTIONAL SHORTENING: 30 % (ref 28–44)
FRACTIONAL SHORTENING: 34 % (ref 28–44)
FUNGITELL COMMENTS: NEGATIVE
GALACTOMANNAN AG SPEC-ACNC: <0.5 INDEX
GAMMA INTERFERON BACKGROUND BLD IA-ACNC: 0.06 IU/ML
GIANT PLATELETS BLD QL SMEAR: PRESENT
GLUCOSE SERPL-MCNC: 100 MG/DL (ref 70–110)
GLUCOSE SERPL-MCNC: 100 MG/DL (ref 70–110)
GLUCOSE SERPL-MCNC: 101 MG/DL (ref 70–110)
GLUCOSE SERPL-MCNC: 102 MG/DL (ref 70–110)
GLUCOSE SERPL-MCNC: 103 MG/DL (ref 70–110)
GLUCOSE SERPL-MCNC: 106 MG/DL (ref 70–110)
GLUCOSE SERPL-MCNC: 107 MG/DL (ref 70–110)
GLUCOSE SERPL-MCNC: 109 MG/DL (ref 70–110)
GLUCOSE SERPL-MCNC: 112 MG/DL (ref 70–110)
GLUCOSE SERPL-MCNC: 117 MG/DL (ref 70–110)
GLUCOSE SERPL-MCNC: 123 MG/DL (ref 70–110)
GLUCOSE SERPL-MCNC: 123 MG/DL (ref 70–110)
GLUCOSE SERPL-MCNC: 124 MG/DL (ref 70–110)
GLUCOSE SERPL-MCNC: 125 MG/DL (ref 70–110)
GLUCOSE SERPL-MCNC: 129 MG/DL (ref 70–110)
GLUCOSE SERPL-MCNC: 130 MG/DL (ref 70–110)
GLUCOSE SERPL-MCNC: 130 MG/DL (ref 70–110)
GLUCOSE SERPL-MCNC: 132 MG/DL (ref 70–110)
GLUCOSE SERPL-MCNC: 132 MG/DL (ref 70–110)
GLUCOSE SERPL-MCNC: 134 MG/DL (ref 70–110)
GLUCOSE SERPL-MCNC: 135 MG/DL (ref 70–110)
GLUCOSE SERPL-MCNC: 135 MG/DL (ref 70–110)
GLUCOSE SERPL-MCNC: 136 MG/DL (ref 70–110)
GLUCOSE SERPL-MCNC: 136 MG/DL (ref 70–110)
GLUCOSE SERPL-MCNC: 137 MG/DL (ref 70–110)
GLUCOSE SERPL-MCNC: 139 MG/DL (ref 70–110)
GLUCOSE SERPL-MCNC: 140 MG/DL (ref 70–110)
GLUCOSE SERPL-MCNC: 141 MG/DL (ref 70–110)
GLUCOSE SERPL-MCNC: 146 MG/DL (ref 70–110)
GLUCOSE SERPL-MCNC: 147 MG/DL (ref 70–110)
GLUCOSE SERPL-MCNC: 148 MG/DL (ref 70–110)
GLUCOSE SERPL-MCNC: 148 MG/DL (ref 70–110)
GLUCOSE SERPL-MCNC: 149 MG/DL (ref 70–110)
GLUCOSE SERPL-MCNC: 150 MG/DL (ref 70–110)
GLUCOSE SERPL-MCNC: 150 MG/DL (ref 70–110)
GLUCOSE SERPL-MCNC: 152 MG/DL (ref 70–110)
GLUCOSE SERPL-MCNC: 154 MG/DL (ref 70–110)
GLUCOSE SERPL-MCNC: 154 MG/DL (ref 70–110)
GLUCOSE SERPL-MCNC: 155 MG/DL (ref 70–110)
GLUCOSE SERPL-MCNC: 156 MG/DL (ref 70–110)
GLUCOSE SERPL-MCNC: 156 MG/DL (ref 70–110)
GLUCOSE SERPL-MCNC: 157 MG/DL (ref 70–110)
GLUCOSE SERPL-MCNC: 158 MG/DL (ref 70–110)
GLUCOSE SERPL-MCNC: 158 MG/DL (ref 70–110)
GLUCOSE SERPL-MCNC: 167 MG/DL (ref 70–110)
GLUCOSE SERPL-MCNC: 171 MG/DL (ref 70–110)
GLUCOSE SERPL-MCNC: 171 MG/DL (ref 70–110)
GLUCOSE SERPL-MCNC: 179 MG/DL (ref 70–110)
GLUCOSE SERPL-MCNC: 184 MG/DL (ref 70–110)
GLUCOSE SERPL-MCNC: 186 MG/DL (ref 70–110)
GLUCOSE SERPL-MCNC: 186 MG/DL (ref 70–110)
GLUCOSE SERPL-MCNC: 188 MG/DL (ref 70–110)
GLUCOSE SERPL-MCNC: 192 MG/DL (ref 70–110)
GLUCOSE SERPL-MCNC: 192 MG/DL (ref 70–110)
GLUCOSE SERPL-MCNC: 195 MG/DL (ref 70–110)
GLUCOSE SERPL-MCNC: 196 MG/DL (ref 70–110)
GLUCOSE SERPL-MCNC: 197 MG/DL (ref 70–110)
GLUCOSE SERPL-MCNC: 197 MG/DL (ref 70–110)
GLUCOSE SERPL-MCNC: 201 MG/DL (ref 70–110)
GLUCOSE SERPL-MCNC: 210 MG/DL (ref 70–110)
GLUCOSE SERPL-MCNC: 212 MG/DL (ref 70–110)
GLUCOSE SERPL-MCNC: 212 MG/DL (ref 70–110)
GLUCOSE SERPL-MCNC: 222 MG/DL (ref 70–110)
GLUCOSE SERPL-MCNC: 222 MG/DL (ref 70–110)
GLUCOSE SERPL-MCNC: 228 MG/DL (ref 70–110)
GLUCOSE SERPL-MCNC: 228 MG/DL (ref 70–110)
GLUCOSE SERPL-MCNC: 231 MG/DL (ref 70–110)
GLUCOSE SERPL-MCNC: 235 MG/DL (ref 70–110)
GLUCOSE SERPL-MCNC: 235 MG/DL (ref 70–110)
GLUCOSE SERPL-MCNC: 239 MG/DL (ref 70–110)
GLUCOSE SERPL-MCNC: 240 MG/DL (ref 70–110)
GLUCOSE SERPL-MCNC: 240 MG/DL (ref 70–110)
GLUCOSE SERPL-MCNC: 256 MG/DL (ref 70–110)
GLUCOSE SERPL-MCNC: 256 MG/DL (ref 70–110)
GLUCOSE SERPL-MCNC: 53 MG/DL (ref 70–110)
GLUCOSE SERPL-MCNC: 71 MG/DL (ref 70–110)
GLUCOSE SERPL-MCNC: 71 MG/DL (ref 70–110)
GLUCOSE SERPL-MCNC: 84 MG/DL (ref 70–110)
GLUCOSE SERPL-MCNC: 84 MG/DL (ref 70–110)
GLUCOSE SERPL-MCNC: 88 MG/DL (ref 70–110)
GLUCOSE SERPL-MCNC: 89 MG/DL (ref 70–110)
GLUCOSE SERPL-MCNC: 90 MG/DL (ref 70–110)
GLUCOSE SERPL-MCNC: 91 MG/DL (ref 70–110)
GLUCOSE SERPL-MCNC: 93 MG/DL (ref 70–110)
GLUCOSE UR QL STRIP: ABNORMAL
GLUCOSE UR QL STRIP: NEGATIVE
GLUCOSE UR QL STRIP: NEGATIVE
GRAM STN SPEC: ABNORMAL
GRAM STN SPEC: NORMAL
HAPTOGLOB SERPL-MCNC: <10 MG/DL (ref 30–250)
HAV IGM SERPL QL IA: ABNORMAL
HBV CORE IGM SERPL QL IA: NEGATIVE
HBV SURFACE AG SERPL QL IA: NEGATIVE
HCO3 UR-SCNC: 15.3 MMOL/L (ref 24–28)
HCO3 UR-SCNC: 21.6 MMOL/L (ref 24–28)
HCO3 UR-SCNC: 21.8 MMOL/L (ref 24–28)
HCO3 UR-SCNC: 22 MMOL/L (ref 24–28)
HCO3 UR-SCNC: 22.1 MMOL/L (ref 24–28)
HCO3 UR-SCNC: 22.4 MMOL/L (ref 24–28)
HCO3 UR-SCNC: 22.6 MMOL/L (ref 24–28)
HCO3 UR-SCNC: 22.9 MMOL/L (ref 24–28)
HCO3 UR-SCNC: 23.5 MMOL/L (ref 24–28)
HCO3 UR-SCNC: 24 MMOL/L (ref 24–28)
HCO3 UR-SCNC: 24.3 MMOL/L (ref 24–28)
HCO3 UR-SCNC: 25.1 MMOL/L (ref 24–28)
HCO3 UR-SCNC: 26.3 MMOL/L (ref 24–28)
HCO3 UR-SCNC: 26.3 MMOL/L (ref 24–28)
HCO3 UR-SCNC: 26.8 MMOL/L (ref 24–28)
HCO3 UR-SCNC: 27.4 MMOL/L (ref 24–28)
HCO3 UR-SCNC: 27.6 MMOL/L (ref 24–28)
HCO3 UR-SCNC: 28.8 MMOL/L (ref 24–28)
HCO3 UR-SCNC: 32.6 MMOL/L (ref 24–28)
HCO3 UR-SCNC: 35 MMOL/L (ref 24–28)
HCT VFR BLD AUTO: 19.8 % (ref 40–54)
HCT VFR BLD AUTO: 20 % (ref 40–54)
HCT VFR BLD AUTO: 20.1 % (ref 40–54)
HCT VFR BLD AUTO: 21.1 % (ref 40–54)
HCT VFR BLD AUTO: 21.1 % (ref 40–54)
HCT VFR BLD AUTO: 21.3 % (ref 40–54)
HCT VFR BLD AUTO: 21.4 % (ref 40–54)
HCT VFR BLD AUTO: 21.4 % (ref 40–54)
HCT VFR BLD AUTO: 21.5 % (ref 40–54)
HCT VFR BLD AUTO: 21.5 % (ref 40–54)
HCT VFR BLD AUTO: 21.8 % (ref 40–54)
HCT VFR BLD AUTO: 22.1 % (ref 40–54)
HCT VFR BLD AUTO: 22.3 % (ref 40–54)
HCT VFR BLD AUTO: 22.3 % (ref 40–54)
HCT VFR BLD AUTO: 22.5 % (ref 40–54)
HCT VFR BLD AUTO: 22.5 % (ref 40–54)
HCT VFR BLD AUTO: 22.6 % (ref 40–54)
HCT VFR BLD AUTO: 23.1 % (ref 40–54)
HCT VFR BLD AUTO: 23.2 % (ref 40–54)
HCT VFR BLD AUTO: 23.5 % (ref 40–54)
HCT VFR BLD AUTO: 23.6 % (ref 40–54)
HCT VFR BLD AUTO: 23.7 % (ref 40–54)
HCT VFR BLD AUTO: 23.8 % (ref 40–54)
HCT VFR BLD AUTO: 24 % (ref 40–54)
HCT VFR BLD AUTO: 24.2 % (ref 40–54)
HCT VFR BLD AUTO: 24.4 % (ref 40–54)
HCT VFR BLD AUTO: 24.7 % (ref 40–54)
HCT VFR BLD AUTO: 24.7 % (ref 40–54)
HCT VFR BLD AUTO: 24.9 % (ref 40–54)
HCT VFR BLD AUTO: 24.9 % (ref 40–54)
HCT VFR BLD AUTO: 25 % (ref 40–54)
HCT VFR BLD AUTO: 25 % (ref 40–54)
HCT VFR BLD AUTO: 25.2 % (ref 40–54)
HCT VFR BLD AUTO: 25.3 % (ref 40–54)
HCT VFR BLD AUTO: 25.3 % (ref 40–54)
HCT VFR BLD AUTO: 25.5 % (ref 40–54)
HCT VFR BLD AUTO: 25.7 % (ref 40–54)
HCT VFR BLD AUTO: 25.7 % (ref 40–54)
HCT VFR BLD AUTO: 25.8 % (ref 40–54)
HCT VFR BLD AUTO: 25.9 % (ref 40–54)
HCT VFR BLD AUTO: 26 % (ref 40–54)
HCT VFR BLD AUTO: 26.2 % (ref 40–54)
HCT VFR BLD AUTO: 26.2 % (ref 40–54)
HCT VFR BLD AUTO: 26.5 % (ref 40–54)
HCT VFR BLD AUTO: 26.6 % (ref 40–54)
HCT VFR BLD AUTO: 26.7 % (ref 40–54)
HCT VFR BLD AUTO: 26.7 % (ref 40–54)
HCT VFR BLD AUTO: 26.9 % (ref 40–54)
HCT VFR BLD AUTO: 27.5 % (ref 40–54)
HCT VFR BLD AUTO: 27.7 % (ref 40–54)
HCT VFR BLD AUTO: 28.2 % (ref 40–54)
HCT VFR BLD CALC: 32 %PCV (ref 36–54)
HCV AB SERPL QL IA: NEGATIVE
HCV AB SERPL QL IA: NEGATIVE
HGB BLD-MCNC: 6.9 G/DL (ref 14–18)
HGB BLD-MCNC: 6.9 G/DL (ref 14–18)
HGB BLD-MCNC: 7 G/DL (ref 14–18)
HGB BLD-MCNC: 7.2 G/DL (ref 14–18)
HGB BLD-MCNC: 7.4 G/DL (ref 14–18)
HGB BLD-MCNC: 7.5 G/DL (ref 14–18)
HGB BLD-MCNC: 7.5 G/DL (ref 14–18)
HGB BLD-MCNC: 7.6 G/DL (ref 14–18)
HGB BLD-MCNC: 7.7 G/DL (ref 14–18)
HGB BLD-MCNC: 7.9 G/DL (ref 14–18)
HGB BLD-MCNC: 8 G/DL (ref 14–18)
HGB BLD-MCNC: 8 G/DL (ref 14–18)
HGB BLD-MCNC: 8.1 G/DL (ref 14–18)
HGB BLD-MCNC: 8.1 G/DL (ref 14–18)
HGB BLD-MCNC: 8.2 G/DL (ref 14–18)
HGB BLD-MCNC: 8.3 G/DL (ref 14–18)
HGB BLD-MCNC: 8.4 G/DL (ref 14–18)
HGB BLD-MCNC: 8.5 G/DL (ref 14–18)
HGB BLD-MCNC: 8.6 G/DL (ref 14–18)
HGB BLD-MCNC: 8.6 G/DL (ref 14–18)
HGB BLD-MCNC: 8.8 G/DL (ref 14–18)
HGB BLD-MCNC: 8.9 G/DL (ref 14–18)
HGB BLD-MCNC: 8.9 G/DL (ref 14–18)
HGB BLD-MCNC: 9 G/DL (ref 14–18)
HGB BLD-MCNC: 9.1 G/DL (ref 14–18)
HGB BLD-MCNC: 9.2 G/DL (ref 14–18)
HGB BLD-MCNC: 9.3 G/DL (ref 14–18)
HGB BLD-MCNC: 9.3 G/DL (ref 14–18)
HGB BLD-MCNC: 9.5 G/DL (ref 14–18)
HGB BLD-MCNC: 9.5 G/DL (ref 14–18)
HGB BLD-MCNC: 9.6 G/DL (ref 14–18)
HGB BLD-MCNC: 9.6 G/DL (ref 14–18)
HGB BLD-MCNC: 9.8 G/DL (ref 14–18)
HGB BLD-MCNC: 9.8 G/DL (ref 14–18)
HGB UR QL STRIP: ABNORMAL
HGB UR QL STRIP: ABNORMAL
HGB UR QL STRIP: NEGATIVE
HIV 1+2 AB+HIV1 P24 AG SERPL QL IA: NEGATIVE
HIV 1+2 AB+HIV1 P24 AG SERPL QL IA: NEGATIVE
HOWELL-JOLLY BOD BLD QL SMEAR: ABNORMAL
HUMAN BOCAVIRUS: NOT DETECTED
HUMAN CORONAVIRUS, COMMON COLD VIRUS: NOT DETECTED
HYALINE CASTS UR QL AUTO: 9 /LPF
HYPOCHROMIA BLD QL SMEAR: ABNORMAL
IGG SERPL-MCNC: 1462 MG/DL (ref 650–1600)
IMM GRANULOCYTES # BLD AUTO: 0.13 K/UL (ref 0–0.04)
IMM GRANULOCYTES # BLD AUTO: 0.15 K/UL (ref 0–0.04)
IMM GRANULOCYTES # BLD AUTO: 0.17 K/UL (ref 0–0.04)
IMM GRANULOCYTES # BLD AUTO: 0.18 K/UL (ref 0–0.04)
IMM GRANULOCYTES # BLD AUTO: 0.2 K/UL (ref 0–0.04)
IMM GRANULOCYTES # BLD AUTO: 0.21 K/UL (ref 0–0.04)
IMM GRANULOCYTES # BLD AUTO: 0.28 K/UL (ref 0–0.04)
IMM GRANULOCYTES # BLD AUTO: 0.3 K/UL (ref 0–0.04)
IMM GRANULOCYTES # BLD AUTO: 0.38 K/UL (ref 0–0.04)
IMM GRANULOCYTES # BLD AUTO: 0.52 K/UL (ref 0–0.04)
IMM GRANULOCYTES # BLD AUTO: 0.54 K/UL (ref 0–0.04)
IMM GRANULOCYTES # BLD AUTO: 0.58 K/UL (ref 0–0.04)
IMM GRANULOCYTES # BLD AUTO: 0.74 K/UL (ref 0–0.04)
IMM GRANULOCYTES # BLD AUTO: 0.75 K/UL (ref 0–0.04)
IMM GRANULOCYTES # BLD AUTO: 0.85 K/UL (ref 0–0.04)
IMM GRANULOCYTES # BLD AUTO: 0.96 K/UL (ref 0–0.04)
IMM GRANULOCYTES # BLD AUTO: 1.06 K/UL (ref 0–0.04)
IMM GRANULOCYTES # BLD AUTO: 1.08 K/UL (ref 0–0.04)
IMM GRANULOCYTES # BLD AUTO: 1.15 K/UL (ref 0–0.04)
IMM GRANULOCYTES # BLD AUTO: 1.23 K/UL (ref 0–0.04)
IMM GRANULOCYTES # BLD AUTO: 1.35 K/UL (ref 0–0.04)
IMM GRANULOCYTES # BLD AUTO: 1.45 K/UL (ref 0–0.04)
IMM GRANULOCYTES # BLD AUTO: 1.51 K/UL (ref 0–0.04)
IMM GRANULOCYTES # BLD AUTO: 1.56 K/UL (ref 0–0.04)
IMM GRANULOCYTES # BLD AUTO: 1.56 K/UL (ref 0–0.04)
IMM GRANULOCYTES # BLD AUTO: 1.61 K/UL (ref 0–0.04)
IMM GRANULOCYTES # BLD AUTO: 1.75 K/UL (ref 0–0.04)
IMM GRANULOCYTES # BLD AUTO: 1.8 K/UL (ref 0–0.04)
IMM GRANULOCYTES # BLD AUTO: 1.87 K/UL (ref 0–0.04)
IMM GRANULOCYTES # BLD AUTO: 2 K/UL (ref 0–0.04)
IMM GRANULOCYTES # BLD AUTO: 2.06 K/UL (ref 0–0.04)
IMM GRANULOCYTES # BLD AUTO: 2.52 K/UL (ref 0–0.04)
IMM GRANULOCYTES # BLD AUTO: 2.69 K/UL (ref 0–0.04)
IMM GRANULOCYTES # BLD AUTO: 2.77 K/UL (ref 0–0.04)
IMM GRANULOCYTES # BLD AUTO: 2.77 K/UL (ref 0–0.04)
IMM GRANULOCYTES # BLD AUTO: 2.84 K/UL (ref 0–0.04)
IMM GRANULOCYTES # BLD AUTO: 2.9 K/UL (ref 0–0.04)
IMM GRANULOCYTES # BLD AUTO: 3.06 K/UL (ref 0–0.04)
IMM GRANULOCYTES # BLD AUTO: 3.12 K/UL (ref 0–0.04)
IMM GRANULOCYTES # BLD AUTO: ABNORMAL K/UL (ref 0–0.04)
IMM GRANULOCYTES NFR BLD AUTO: 0.9 % (ref 0–0.5)
IMM GRANULOCYTES NFR BLD AUTO: 0.9 % (ref 0–0.5)
IMM GRANULOCYTES NFR BLD AUTO: 1 % (ref 0–0.5)
IMM GRANULOCYTES NFR BLD AUTO: 1.1 % (ref 0–0.5)
IMM GRANULOCYTES NFR BLD AUTO: 1.1 % (ref 0–0.5)
IMM GRANULOCYTES NFR BLD AUTO: 1.2 % (ref 0–0.5)
IMM GRANULOCYTES NFR BLD AUTO: 1.2 % (ref 0–0.5)
IMM GRANULOCYTES NFR BLD AUTO: 1.3 % (ref 0–0.5)
IMM GRANULOCYTES NFR BLD AUTO: 1.5 % (ref 0–0.5)
IMM GRANULOCYTES NFR BLD AUTO: 1.6 % (ref 0–0.5)
IMM GRANULOCYTES NFR BLD AUTO: 1.7 % (ref 0–0.5)
IMM GRANULOCYTES NFR BLD AUTO: 2 % (ref 0–0.5)
IMM GRANULOCYTES NFR BLD AUTO: 2.2 % (ref 0–0.5)
IMM GRANULOCYTES NFR BLD AUTO: 2.2 % (ref 0–0.5)
IMM GRANULOCYTES NFR BLD AUTO: 2.3 % (ref 0–0.5)
IMM GRANULOCYTES NFR BLD AUTO: 2.4 % (ref 0–0.5)
IMM GRANULOCYTES NFR BLD AUTO: 2.4 % (ref 0–0.5)
IMM GRANULOCYTES NFR BLD AUTO: 2.5 % (ref 0–0.5)
IMM GRANULOCYTES NFR BLD AUTO: 2.6 % (ref 0–0.5)
IMM GRANULOCYTES NFR BLD AUTO: 2.6 % (ref 0–0.5)
IMM GRANULOCYTES NFR BLD AUTO: 2.7 % (ref 0–0.5)
IMM GRANULOCYTES NFR BLD AUTO: 2.9 % (ref 0–0.5)
IMM GRANULOCYTES NFR BLD AUTO: 3.2 % (ref 0–0.5)
IMM GRANULOCYTES NFR BLD AUTO: 3.3 % (ref 0–0.5)
IMM GRANULOCYTES NFR BLD AUTO: 3.3 % (ref 0–0.5)
IMM GRANULOCYTES NFR BLD AUTO: 3.4 % (ref 0–0.5)
IMM GRANULOCYTES NFR BLD AUTO: 3.5 % (ref 0–0.5)
IMM GRANULOCYTES NFR BLD AUTO: 3.8 % (ref 0–0.5)
IMM GRANULOCYTES NFR BLD AUTO: 4.3 % (ref 0–0.5)
IMM GRANULOCYTES NFR BLD AUTO: 4.4 % (ref 0–0.5)
IMM GRANULOCYTES NFR BLD AUTO: 4.5 % (ref 0–0.5)
IMM GRANULOCYTES NFR BLD AUTO: 4.6 % (ref 0–0.5)
IMM GRANULOCYTES NFR BLD AUTO: 4.6 % (ref 0–0.5)
IMM GRANULOCYTES NFR BLD AUTO: 4.7 % (ref 0–0.5)
IMM GRANULOCYTES NFR BLD AUTO: 4.8 % (ref 0–0.5)
IMM GRANULOCYTES NFR BLD AUTO: 4.8 % (ref 0–0.5)
IMM GRANULOCYTES NFR BLD AUTO: 4.9 % (ref 0–0.5)
IMM GRANULOCYTES NFR BLD AUTO: ABNORMAL % (ref 0–0.5)
INFLUENZA A - H1N1-09: NOT DETECTED
INR PPP: 1.8 (ref 0.8–1.2)
INR PPP: 1.9 (ref 0.8–1.2)
INR PPP: 1.9 (ref 0.8–1.2)
INR PPP: 2 (ref 0.8–1.2)
INR PPP: 2.1 (ref 0.8–1.2)
INR PPP: 2.2 (ref 0.8–1.2)
INR PPP: 2.8 (ref 0.8–1.2)
INR PPP: 2.9 (ref 0.8–1.2)
INR PPP: 3 (ref 0.8–1.2)
INR PPP: 3.1 (ref 0.8–1.2)
INR PPP: 3.3 (ref 0.8–1.2)
INR PPP: 3.6 (ref 0.8–1.2)
INR PPP: 3.9 (ref 0.8–1.2)
INR PPP: 5.1 (ref 0.8–1.2)
INR PPP: 5.2 (ref 0.8–1.2)
INTERVENTRICULAR SEPTUM: 0.59 CM (ref 0.6–1.1)
INTERVENTRICULAR SEPTUM: 0.66 CM (ref 0.6–1.1)
IP: 12
IP: 5
IP: 5
IRON SERPL-MCNC: 61 UG/DL (ref 45–160)
IT: 0.6
IT: 0.66
IT: 0.9
KETONES UR QL STRIP: ABNORMAL
KETONES UR QL STRIP: NEGATIVE
KETONES UR QL STRIP: NEGATIVE
L PNEUMO AG UR QL IA: NEGATIVE
LA MAJOR: 4.48 CM
LA MINOR: 4.36 CM
LA WIDTH: 2.85 CM
LACTATE SERPL-SCNC: 1 MMOL/L (ref 0.5–2.2)
LACTATE SERPL-SCNC: 1 MMOL/L (ref 0.5–2.2)
LACTATE SERPL-SCNC: 1.6 MMOL/L (ref 0.5–2.2)
LACTATE SERPL-SCNC: 1.7 MMOL/L (ref 0.5–2.2)
LACTATE SERPL-SCNC: 2.8 MMOL/L (ref 0.5–2.2)
LACTATE SERPL-SCNC: 4.3 MMOL/L (ref 0.5–2.2)
LACTATE SERPL-SCNC: 4.4 MMOL/L (ref 0.5–2.2)
LDH FLD L TO P-CCNC: 109 U/L
LDH FLD L TO P-CCNC: 62 U/L
LDH SERPL L TO P-CCNC: 402 U/L (ref 110–260)
LEFT ATRIUM SIZE: 2.92 CM
LEFT ATRIUM SIZE: 4.25 CM
LEFT ATRIUM VOLUME INDEX: 20.7 ML/M2
LEFT ATRIUM VOLUME: 31.26 CM3
LEFT INTERNAL DIMENSION IN SYSTOLE: 2.83 CM (ref 2.1–4)
LEFT INTERNAL DIMENSION IN SYSTOLE: 2.84 CM (ref 2.1–4)
LEFT VENTRICLE DIASTOLIC VOLUME INDEX: 47.15 ML/M2
LEFT VENTRICLE DIASTOLIC VOLUME INDEX: 55.26 ML/M2
LEFT VENTRICLE DIASTOLIC VOLUME: 71.19 ML
LEFT VENTRICLE DIASTOLIC VOLUME: 83.44 ML
LEFT VENTRICLE MASS INDEX: 44 G/M2
LEFT VENTRICLE MASS INDEX: 60 G/M2
LEFT VENTRICLE SYSTOLIC VOLUME INDEX: 20 ML/M2
LEFT VENTRICLE SYSTOLIC VOLUME INDEX: 20.2 ML/M2
LEFT VENTRICLE SYSTOLIC VOLUME: 30.26 ML
LEFT VENTRICLE SYSTOLIC VOLUME: 30.47 ML
LEFT VENTRICULAR INTERNAL DIMENSION IN DIASTOLE: 4.03 CM (ref 3.5–6)
LEFT VENTRICULAR INTERNAL DIMENSION IN DIASTOLE: 4.31 CM (ref 3.5–6)
LEFT VENTRICULAR MASS: 65.85 G
LEFT VENTRICULAR MASS: 91.33 G
LEGIONELLA PNEUMOPHILA: NOT DETECTED
LEUKOCYTE ESTERASE UR QL STRIP: ABNORMAL
LEUKOCYTE ESTERASE UR QL STRIP: NEGATIVE
LEUKOCYTE ESTERASE UR QL STRIP: NEGATIVE
LIPASE SERPL-CCNC: <3 U/L (ref 4–60)
LYMPHOCYTES # BLD AUTO: 0.3 K/UL (ref 1–4.8)
LYMPHOCYTES # BLD AUTO: 0.3 K/UL (ref 1–4.8)
LYMPHOCYTES # BLD AUTO: 0.6 K/UL (ref 1–4.8)
LYMPHOCYTES # BLD AUTO: 0.6 K/UL (ref 1–4.8)
LYMPHOCYTES # BLD AUTO: 0.7 K/UL (ref 1–4.8)
LYMPHOCYTES # BLD AUTO: 0.8 K/UL (ref 1–4.8)
LYMPHOCYTES # BLD AUTO: 0.9 K/UL (ref 1–4.8)
LYMPHOCYTES # BLD AUTO: 1 K/UL (ref 1–4.8)
LYMPHOCYTES # BLD AUTO: 1.1 K/UL (ref 1–4.8)
LYMPHOCYTES # BLD AUTO: 1.2 K/UL (ref 1–4.8)
LYMPHOCYTES # BLD AUTO: 1.2 K/UL (ref 1–4.8)
LYMPHOCYTES # BLD AUTO: 1.3 K/UL (ref 1–4.8)
LYMPHOCYTES # BLD AUTO: 1.4 K/UL (ref 1–4.8)
LYMPHOCYTES # BLD AUTO: 1.5 K/UL (ref 1–4.8)
LYMPHOCYTES # BLD AUTO: 1.8 K/UL (ref 1–4.8)
LYMPHOCYTES # BLD AUTO: 2 K/UL (ref 1–4.8)
LYMPHOCYTES # BLD AUTO: 2 K/UL (ref 1–4.8)
LYMPHOCYTES # BLD AUTO: 2.1 K/UL (ref 1–4.8)
LYMPHOCYTES # BLD AUTO: 2.3 K/UL (ref 1–4.8)
LYMPHOCYTES # BLD AUTO: 2.4 K/UL (ref 1–4.8)
LYMPHOCYTES # BLD AUTO: ABNORMAL K/UL (ref 1–4.8)
LYMPHOCYTES NFR BLD: 0.8 % (ref 18–48)
LYMPHOCYTES NFR BLD: 0.8 % (ref 18–48)
LYMPHOCYTES NFR BLD: 1 % (ref 18–48)
LYMPHOCYTES NFR BLD: 1 % (ref 18–48)
LYMPHOCYTES NFR BLD: 1.3 % (ref 18–48)
LYMPHOCYTES NFR BLD: 1.5 % (ref 18–48)
LYMPHOCYTES NFR BLD: 1.6 % (ref 18–48)
LYMPHOCYTES NFR BLD: 1.7 % (ref 18–48)
LYMPHOCYTES NFR BLD: 1.8 % (ref 18–48)
LYMPHOCYTES NFR BLD: 1.8 % (ref 18–48)
LYMPHOCYTES NFR BLD: 1.9 % (ref 18–48)
LYMPHOCYTES NFR BLD: 2 % (ref 18–48)
LYMPHOCYTES NFR BLD: 2.1 % (ref 18–48)
LYMPHOCYTES NFR BLD: 2.3 % (ref 18–48)
LYMPHOCYTES NFR BLD: 2.3 % (ref 18–48)
LYMPHOCYTES NFR BLD: 2.7 % (ref 18–48)
LYMPHOCYTES NFR BLD: 2.8 % (ref 18–48)
LYMPHOCYTES NFR BLD: 2.9 % (ref 18–48)
LYMPHOCYTES NFR BLD: 3 % (ref 18–48)
LYMPHOCYTES NFR BLD: 3.2 % (ref 18–48)
LYMPHOCYTES NFR BLD: 3.6 % (ref 18–48)
LYMPHOCYTES NFR BLD: 3.7 % (ref 18–48)
LYMPHOCYTES NFR BLD: 3.7 % (ref 18–48)
LYMPHOCYTES NFR BLD: 3.9 % (ref 18–48)
LYMPHOCYTES NFR BLD: 4 % (ref 18–48)
LYMPHOCYTES NFR BLD: 4.2 % (ref 18–48)
LYMPHOCYTES NFR BLD: 4.2 % (ref 18–48)
LYMPHOCYTES NFR BLD: 4.3 % (ref 18–48)
LYMPHOCYTES NFR BLD: 4.4 % (ref 18–48)
LYMPHOCYTES NFR BLD: 4.4 % (ref 18–48)
LYMPHOCYTES NFR BLD: 4.6 % (ref 18–48)
LYMPHOCYTES NFR BLD: 4.7 % (ref 18–48)
LYMPHOCYTES NFR BLD: 4.8 % (ref 18–48)
LYMPHOCYTES NFR BLD: 5 % (ref 18–48)
LYMPHOCYTES NFR BLD: 6 % (ref 18–48)
LYMPHOCYTES NFR BLD: 6.1 % (ref 18–48)
LYMPHOCYTES NFR BLD: 6.2 % (ref 18–48)
LYMPHOCYTES NFR BLD: 6.3 % (ref 18–48)
LYMPHOCYTES NFR BLD: 6.3 % (ref 18–48)
LYMPHOCYTES NFR BLD: 7.1 % (ref 18–48)
LYMPHOCYTES NFR FLD MANUAL: 67 %
LYMPHOCYTES NFR FLD MANUAL: 73 %
M TB IFN-G CD4+ BCKGRND COR BLD-ACNC: -0.01 IU/ML
MAGNESIUM SERPL-MCNC: 1.1 MG/DL (ref 1.6–2.6)
MAGNESIUM SERPL-MCNC: 1.6 MG/DL (ref 1.6–2.6)
MAGNESIUM SERPL-MCNC: 1.8 MG/DL (ref 1.6–2.6)
MAGNESIUM SERPL-MCNC: 1.9 MG/DL (ref 1.6–2.6)
MAGNESIUM SERPL-MCNC: 2 MG/DL (ref 1.6–2.6)
MAGNESIUM SERPL-MCNC: 2.1 MG/DL (ref 1.6–2.6)
MAGNESIUM SERPL-MCNC: 2.2 MG/DL (ref 1.6–2.6)
MAGNESIUM SERPL-MCNC: 2.3 MG/DL (ref 1.6–2.6)
MAGNESIUM SERPL-MCNC: 2.3 MG/DL (ref 1.6–2.6)
MAGNESIUM SERPL-MCNC: 2.4 MG/DL (ref 1.6–2.6)
MAGNESIUM SERPL-MCNC: 2.5 MG/DL (ref 1.6–2.6)
MAGNESIUM SERPL-MCNC: 2.6 MG/DL (ref 1.6–2.6)
MAGNESIUM SERPL-MCNC: 2.7 MG/DL (ref 1.6–2.6)
MAGNESIUM SERPL-MCNC: 2.8 MG/DL (ref 1.6–2.6)
MAGNESIUM SERPL-MCNC: 3 MG/DL (ref 1.6–2.6)
MAP: 11
MAP: 12
MCH RBC QN AUTO: 33.7 PG (ref 27–31)
MCH RBC QN AUTO: 33.7 PG (ref 27–31)
MCH RBC QN AUTO: 33.9 PG (ref 27–31)
MCH RBC QN AUTO: 34.3 PG (ref 27–31)
MCH RBC QN AUTO: 34.4 PG (ref 27–31)
MCH RBC QN AUTO: 34.4 PG (ref 27–31)
MCH RBC QN AUTO: 34.6 PG (ref 27–31)
MCH RBC QN AUTO: 34.7 PG (ref 27–31)
MCH RBC QN AUTO: 34.8 PG (ref 27–31)
MCH RBC QN AUTO: 35 PG (ref 27–31)
MCH RBC QN AUTO: 35.1 PG (ref 27–31)
MCH RBC QN AUTO: 35.2 PG (ref 27–31)
MCH RBC QN AUTO: 35.3 PG (ref 27–31)
MCH RBC QN AUTO: 35.4 PG (ref 27–31)
MCH RBC QN AUTO: 35.6 PG (ref 27–31)
MCH RBC QN AUTO: 35.7 PG (ref 27–31)
MCH RBC QN AUTO: 35.8 PG (ref 27–31)
MCH RBC QN AUTO: 35.9 PG (ref 27–31)
MCH RBC QN AUTO: 36 PG (ref 27–31)
MCH RBC QN AUTO: 36.1 PG (ref 27–31)
MCH RBC QN AUTO: 36.2 PG (ref 27–31)
MCH RBC QN AUTO: 36.3 PG (ref 27–31)
MCH RBC QN AUTO: 36.3 PG (ref 27–31)
MCH RBC QN AUTO: 36.4 PG (ref 27–31)
MCH RBC QN AUTO: 36.5 PG (ref 27–31)
MCH RBC QN AUTO: 36.5 PG (ref 27–31)
MCH RBC QN AUTO: 36.6 PG (ref 27–31)
MCH RBC QN AUTO: 36.6 PG (ref 27–31)
MCH RBC QN AUTO: 36.7 PG (ref 27–31)
MCH RBC QN AUTO: 36.7 PG (ref 27–31)
MCH RBC QN AUTO: 36.8 PG (ref 27–31)
MCH RBC QN AUTO: 37 PG (ref 27–31)
MCH RBC QN AUTO: 37.2 PG (ref 27–31)
MCHC RBC AUTO-ENTMCNC: 32.1 G/DL (ref 32–36)
MCHC RBC AUTO-ENTMCNC: 32.3 G/DL (ref 32–36)
MCHC RBC AUTO-ENTMCNC: 32.9 G/DL (ref 32–36)
MCHC RBC AUTO-ENTMCNC: 32.9 G/DL (ref 32–36)
MCHC RBC AUTO-ENTMCNC: 33.1 G/DL (ref 32–36)
MCHC RBC AUTO-ENTMCNC: 33.2 G/DL (ref 32–36)
MCHC RBC AUTO-ENTMCNC: 33.3 G/DL (ref 32–36)
MCHC RBC AUTO-ENTMCNC: 33.5 G/DL (ref 32–36)
MCHC RBC AUTO-ENTMCNC: 33.6 G/DL (ref 32–36)
MCHC RBC AUTO-ENTMCNC: 33.7 G/DL (ref 32–36)
MCHC RBC AUTO-ENTMCNC: 33.8 G/DL (ref 32–36)
MCHC RBC AUTO-ENTMCNC: 34 G/DL (ref 32–36)
MCHC RBC AUTO-ENTMCNC: 34.3 G/DL (ref 32–36)
MCHC RBC AUTO-ENTMCNC: 34.4 G/DL (ref 32–36)
MCHC RBC AUTO-ENTMCNC: 34.5 G/DL (ref 32–36)
MCHC RBC AUTO-ENTMCNC: 34.6 G/DL (ref 32–36)
MCHC RBC AUTO-ENTMCNC: 34.7 G/DL (ref 32–36)
MCHC RBC AUTO-ENTMCNC: 34.8 G/DL (ref 32–36)
MCHC RBC AUTO-ENTMCNC: 34.9 G/DL (ref 32–36)
MCHC RBC AUTO-ENTMCNC: 35 G/DL (ref 32–36)
MCHC RBC AUTO-ENTMCNC: 35 G/DL (ref 32–36)
MCHC RBC AUTO-ENTMCNC: 35.1 G/DL (ref 32–36)
MCHC RBC AUTO-ENTMCNC: 35.4 G/DL (ref 32–36)
MCHC RBC AUTO-ENTMCNC: 35.4 G/DL (ref 32–36)
MCHC RBC AUTO-ENTMCNC: 35.5 G/DL (ref 32–36)
MCHC RBC AUTO-ENTMCNC: 35.6 G/DL (ref 32–36)
MCHC RBC AUTO-ENTMCNC: 35.7 G/DL (ref 32–36)
MCHC RBC AUTO-ENTMCNC: 36 G/DL (ref 32–36)
MCHC RBC AUTO-ENTMCNC: 36.2 G/DL (ref 32–36)
MCHC RBC AUTO-ENTMCNC: 36.3 G/DL (ref 32–36)
MCV RBC AUTO: 100 FL (ref 82–98)
MCV RBC AUTO: 101 FL (ref 82–98)
MCV RBC AUTO: 101 FL (ref 82–98)
MCV RBC AUTO: 102 FL (ref 82–98)
MCV RBC AUTO: 103 FL (ref 82–98)
MCV RBC AUTO: 104 FL (ref 82–98)
MCV RBC AUTO: 105 FL (ref 82–98)
MCV RBC AUTO: 106 FL (ref 82–98)
MCV RBC AUTO: 107 FL (ref 82–98)
MCV RBC AUTO: 108 FL (ref 82–98)
MCV RBC AUTO: 108 FL (ref 82–98)
MCV RBC AUTO: 109 FL (ref 82–98)
MCV RBC AUTO: 98 FL (ref 82–98)
MCV RBC AUTO: 98 FL (ref 82–98)
MCV RBC AUTO: 99 FL (ref 82–98)
MESOTHL CELL NFR FLD MANUAL: 6 %
METAMYELOCYTES NFR BLD MANUAL: 1 %
METAMYELOCYTES NFR BLD MANUAL: 1 %
METAMYELOCYTES NFR BLD MANUAL: 2 %
METHADONE UR QL SCN>300 NG/ML: NEGATIVE
MICROSCOPIC COMMENT: ABNORMAL
MICROSCOPIC COMMENT: ABNORMAL
MIN VOL: 12.6
MIN VOL: 8.4
MIN VOL: 8.41
MIN VOL: 8.77
MITOCHONDRIA AB TITR SER IF: NORMAL {TITER}
MITOGEN IGNF BCKGRD COR BLD-ACNC: 0 IU/ML
MODE: ABNORMAL
MONOCYTES # BLD AUTO: 1 K/UL (ref 0.3–1)
MONOCYTES # BLD AUTO: 1 K/UL (ref 0.3–1)
MONOCYTES # BLD AUTO: 1.3 K/UL (ref 0.3–1)
MONOCYTES # BLD AUTO: 1.3 K/UL (ref 0.3–1)
MONOCYTES # BLD AUTO: 1.5 K/UL (ref 0.3–1)
MONOCYTES # BLD AUTO: 1.6 K/UL (ref 0.3–1)
MONOCYTES # BLD AUTO: 1.9 K/UL (ref 0.3–1)
MONOCYTES # BLD AUTO: 1.9 K/UL (ref 0.3–1)
MONOCYTES # BLD AUTO: 2 K/UL (ref 0.3–1)
MONOCYTES # BLD AUTO: 2.1 K/UL (ref 0.3–1)
MONOCYTES # BLD AUTO: 2.1 K/UL (ref 0.3–1)
MONOCYTES # BLD AUTO: 2.2 K/UL (ref 0.3–1)
MONOCYTES # BLD AUTO: 2.3 K/UL (ref 0.3–1)
MONOCYTES # BLD AUTO: 2.3 K/UL (ref 0.3–1)
MONOCYTES # BLD AUTO: 2.4 K/UL (ref 0.3–1)
MONOCYTES # BLD AUTO: 2.5 K/UL (ref 0.3–1)
MONOCYTES # BLD AUTO: 2.6 K/UL (ref 0.3–1)
MONOCYTES # BLD AUTO: 2.6 K/UL (ref 0.3–1)
MONOCYTES # BLD AUTO: 2.7 K/UL (ref 0.3–1)
MONOCYTES # BLD AUTO: 2.8 K/UL (ref 0.3–1)
MONOCYTES # BLD AUTO: 2.8 K/UL (ref 0.3–1)
MONOCYTES # BLD AUTO: 2.9 K/UL (ref 0.3–1)
MONOCYTES # BLD AUTO: 2.9 K/UL (ref 0.3–1)
MONOCYTES # BLD AUTO: 3 K/UL (ref 0.3–1)
MONOCYTES # BLD AUTO: 3 K/UL (ref 0.3–1)
MONOCYTES # BLD AUTO: 3.1 K/UL (ref 0.3–1)
MONOCYTES # BLD AUTO: 3.2 K/UL (ref 0.3–1)
MONOCYTES # BLD AUTO: 3.3 K/UL (ref 0.3–1)
MONOCYTES # BLD AUTO: 3.3 K/UL (ref 0.3–1)
MONOCYTES # BLD AUTO: 3.4 K/UL (ref 0.3–1)
MONOCYTES # BLD AUTO: 3.9 K/UL (ref 0.3–1)
MONOCYTES # BLD AUTO: 4 K/UL (ref 0.3–1)
MONOCYTES # BLD AUTO: 4.2 K/UL (ref 0.3–1)
MONOCYTES # BLD AUTO: 4.4 K/UL (ref 0.3–1)
MONOCYTES # BLD AUTO: ABNORMAL K/UL (ref 0.3–1)
MONOCYTES NFR BLD: 10 % (ref 4–15)
MONOCYTES NFR BLD: 10.1 % (ref 4–15)
MONOCYTES NFR BLD: 10.2 % (ref 4–15)
MONOCYTES NFR BLD: 10.2 % (ref 4–15)
MONOCYTES NFR BLD: 10.3 % (ref 4–15)
MONOCYTES NFR BLD: 10.4 % (ref 4–15)
MONOCYTES NFR BLD: 11 % (ref 4–15)
MONOCYTES NFR BLD: 11.3 % (ref 4–15)
MONOCYTES NFR BLD: 11.9 % (ref 4–15)
MONOCYTES NFR BLD: 12.3 % (ref 4–15)
MONOCYTES NFR BLD: 12.5 % (ref 4–15)
MONOCYTES NFR BLD: 12.6 % (ref 4–15)
MONOCYTES NFR BLD: 13.7 % (ref 4–15)
MONOCYTES NFR BLD: 2 % (ref 4–15)
MONOCYTES NFR BLD: 2.9 % (ref 4–15)
MONOCYTES NFR BLD: 3.3 % (ref 4–15)
MONOCYTES NFR BLD: 3.7 % (ref 4–15)
MONOCYTES NFR BLD: 4 % (ref 4–15)
MONOCYTES NFR BLD: 4.4 % (ref 4–15)
MONOCYTES NFR BLD: 4.5 % (ref 4–15)
MONOCYTES NFR BLD: 4.6 % (ref 4–15)
MONOCYTES NFR BLD: 4.8 % (ref 4–15)
MONOCYTES NFR BLD: 4.8 % (ref 4–15)
MONOCYTES NFR BLD: 5 % (ref 4–15)
MONOCYTES NFR BLD: 5.2 % (ref 4–15)
MONOCYTES NFR BLD: 5.3 % (ref 4–15)
MONOCYTES NFR BLD: 5.3 % (ref 4–15)
MONOCYTES NFR BLD: 5.4 % (ref 4–15)
MONOCYTES NFR BLD: 5.5 % (ref 4–15)
MONOCYTES NFR BLD: 5.6 % (ref 4–15)
MONOCYTES NFR BLD: 6 % (ref 4–15)
MONOCYTES NFR BLD: 6 % (ref 4–15)
MONOCYTES NFR BLD: 6.6 % (ref 4–15)
MONOCYTES NFR BLD: 6.6 % (ref 4–15)
MONOCYTES NFR BLD: 6.8 % (ref 4–15)
MONOCYTES NFR BLD: 7 % (ref 4–15)
MONOCYTES NFR BLD: 7.1 % (ref 4–15)
MONOCYTES NFR BLD: 7.4 % (ref 4–15)
MONOCYTES NFR BLD: 7.4 % (ref 4–15)
MONOCYTES NFR BLD: 7.9 % (ref 4–15)
MONOCYTES NFR BLD: 7.9 % (ref 4–15)
MONOCYTES NFR BLD: 8 % (ref 4–15)
MONOCYTES NFR BLD: 8.2 % (ref 4–15)
MONOCYTES NFR BLD: 8.6 % (ref 4–15)
MONOCYTES NFR BLD: 9 % (ref 4–15)
MONOCYTES NFR BLD: 9.1 % (ref 4–15)
MONOCYTES NFR BLD: 9.5 % (ref 4–15)
MONOCYTES NFR BLD: 9.6 % (ref 4–15)
MONOS+MACROS NFR FLD MANUAL: 11 %
MONOS+MACROS NFR FLD MANUAL: 12 %
MORAXELLA CATARRHALIS: NOT DETECTED
MYELOCYTES NFR BLD MANUAL: 1 %
MYELOCYTES NFR BLD MANUAL: 1 %
MYELOCYTES NFR BLD MANUAL: 2 %
MYELOCYTES NFR BLD MANUAL: 2 %
MYELOCYTES NFR BLD MANUAL: 3 %
MYELOCYTES NFR BLD MANUAL: 3 %
NEUTROPHILS # BLD AUTO: 10.3 K/UL (ref 1.8–7.7)
NEUTROPHILS # BLD AUTO: 11.2 K/UL (ref 1.8–7.7)
NEUTROPHILS # BLD AUTO: 11.8 K/UL (ref 1.8–7.7)
NEUTROPHILS # BLD AUTO: 12.6 K/UL (ref 1.8–7.7)
NEUTROPHILS # BLD AUTO: 12.8 K/UL (ref 1.8–7.7)
NEUTROPHILS # BLD AUTO: 13.4 K/UL (ref 1.8–7.7)
NEUTROPHILS # BLD AUTO: 14.2 K/UL (ref 1.8–7.7)
NEUTROPHILS # BLD AUTO: 14.5 K/UL (ref 1.8–7.7)
NEUTROPHILS # BLD AUTO: 16.5 K/UL (ref 1.8–7.7)
NEUTROPHILS # BLD AUTO: 17.2 K/UL (ref 1.8–7.7)
NEUTROPHILS # BLD AUTO: 17.6 K/UL (ref 1.8–7.7)
NEUTROPHILS # BLD AUTO: 19.3 K/UL (ref 1.8–7.7)
NEUTROPHILS # BLD AUTO: 19.9 K/UL (ref 1.8–7.7)
NEUTROPHILS # BLD AUTO: 20 K/UL (ref 1.8–7.7)
NEUTROPHILS # BLD AUTO: 20.6 K/UL (ref 1.8–7.7)
NEUTROPHILS # BLD AUTO: 20.9 K/UL (ref 1.8–7.7)
NEUTROPHILS # BLD AUTO: 26.7 K/UL (ref 1.8–7.7)
NEUTROPHILS # BLD AUTO: 30.9 K/UL (ref 1.8–7.7)
NEUTROPHILS # BLD AUTO: 31 K/UL (ref 1.8–7.7)
NEUTROPHILS # BLD AUTO: 32.4 K/UL (ref 1.8–7.7)
NEUTROPHILS # BLD AUTO: 33.3 K/UL (ref 1.8–7.7)
NEUTROPHILS # BLD AUTO: 33.6 K/UL (ref 1.8–7.7)
NEUTROPHILS # BLD AUTO: 34.6 K/UL (ref 1.8–7.7)
NEUTROPHILS # BLD AUTO: 35.9 K/UL (ref 1.8–7.7)
NEUTROPHILS # BLD AUTO: 40.1 K/UL (ref 1.8–7.7)
NEUTROPHILS # BLD AUTO: 41.4 K/UL (ref 1.8–7.7)
NEUTROPHILS # BLD AUTO: 43 K/UL (ref 1.8–7.7)
NEUTROPHILS # BLD AUTO: 45.6 K/UL (ref 1.8–7.7)
NEUTROPHILS # BLD AUTO: 45.6 K/UL (ref 1.8–7.7)
NEUTROPHILS # BLD AUTO: 45.7 K/UL (ref 1.8–7.7)
NEUTROPHILS # BLD AUTO: 47 K/UL (ref 1.8–7.7)
NEUTROPHILS # BLD AUTO: 48.2 K/UL (ref 1.8–7.7)
NEUTROPHILS # BLD AUTO: 49.6 K/UL (ref 1.8–7.7)
NEUTROPHILS # BLD AUTO: 50.9 K/UL (ref 1.8–7.7)
NEUTROPHILS # BLD AUTO: 51.4 K/UL (ref 1.8–7.7)
NEUTROPHILS # BLD AUTO: 53.2 K/UL (ref 1.8–7.7)
NEUTROPHILS # BLD AUTO: 53.7 K/UL (ref 1.8–7.7)
NEUTROPHILS # BLD AUTO: 55.3 K/UL (ref 1.8–7.7)
NEUTROPHILS # BLD AUTO: 55.7 K/UL (ref 1.8–7.7)
NEUTROPHILS # BLD AUTO: 56.7 K/UL (ref 1.8–7.7)
NEUTROPHILS # BLD AUTO: 58.2 K/UL (ref 1.8–7.7)
NEUTROPHILS NFR BLD: 73.8 % (ref 38–73)
NEUTROPHILS NFR BLD: 75 % (ref 38–73)
NEUTROPHILS NFR BLD: 75 % (ref 38–73)
NEUTROPHILS NFR BLD: 76 % (ref 38–73)
NEUTROPHILS NFR BLD: 77 % (ref 38–73)
NEUTROPHILS NFR BLD: 77.1 % (ref 38–73)
NEUTROPHILS NFR BLD: 77.5 % (ref 38–73)
NEUTROPHILS NFR BLD: 78.3 % (ref 38–73)
NEUTROPHILS NFR BLD: 79.1 % (ref 38–73)
NEUTROPHILS NFR BLD: 79.2 % (ref 38–73)
NEUTROPHILS NFR BLD: 79.6 % (ref 38–73)
NEUTROPHILS NFR BLD: 80 % (ref 38–73)
NEUTROPHILS NFR BLD: 80 % (ref 38–73)
NEUTROPHILS NFR BLD: 80.2 % (ref 38–73)
NEUTROPHILS NFR BLD: 80.7 % (ref 38–73)
NEUTROPHILS NFR BLD: 81 % (ref 38–73)
NEUTROPHILS NFR BLD: 81.2 % (ref 38–73)
NEUTROPHILS NFR BLD: 81.4 % (ref 38–73)
NEUTROPHILS NFR BLD: 81.9 % (ref 38–73)
NEUTROPHILS NFR BLD: 82.3 % (ref 38–73)
NEUTROPHILS NFR BLD: 82.5 % (ref 38–73)
NEUTROPHILS NFR BLD: 83 % (ref 38–73)
NEUTROPHILS NFR BLD: 83 % (ref 38–73)
NEUTROPHILS NFR BLD: 83.5 % (ref 38–73)
NEUTROPHILS NFR BLD: 83.6 % (ref 38–73)
NEUTROPHILS NFR BLD: 84.5 % (ref 38–73)
NEUTROPHILS NFR BLD: 85 % (ref 38–73)
NEUTROPHILS NFR BLD: 85.3 % (ref 38–73)
NEUTROPHILS NFR BLD: 86 % (ref 38–73)
NEUTROPHILS NFR BLD: 87.5 % (ref 38–73)
NEUTROPHILS NFR BLD: 87.6 % (ref 38–73)
NEUTROPHILS NFR BLD: 87.7 % (ref 38–73)
NEUTROPHILS NFR BLD: 87.8 % (ref 38–73)
NEUTROPHILS NFR BLD: 87.8 % (ref 38–73)
NEUTROPHILS NFR BLD: 88 % (ref 38–73)
NEUTROPHILS NFR BLD: 88.3 % (ref 38–73)
NEUTROPHILS NFR BLD: 88.7 % (ref 38–73)
NEUTROPHILS NFR BLD: 88.8 % (ref 38–73)
NEUTROPHILS NFR BLD: 88.9 % (ref 38–73)
NEUTROPHILS NFR BLD: 89 % (ref 38–73)
NEUTROPHILS NFR BLD: 89 % (ref 38–73)
NEUTROPHILS NFR BLD: 89.4 % (ref 38–73)
NEUTROPHILS NFR BLD: 89.7 % (ref 38–73)
NEUTROPHILS NFR BLD: 90.4 % (ref 38–73)
NEUTROPHILS NFR BLD: 90.8 % (ref 38–73)
NEUTROPHILS NFR BLD: 91 % (ref 38–73)
NEUTROPHILS NFR BLD: 91.5 % (ref 38–73)
NEUTROPHILS NFR BLD: 91.7 % (ref 38–73)
NEUTROPHILS NFR BLD: 92 % (ref 38–73)
NEUTROPHILS NFR BLD: 92 % (ref 38–73)
NEUTROPHILS NFR BLD: 93 % (ref 38–73)
NEUTROPHILS NFR FLD MANUAL: 21 %
NEUTROPHILS NFR FLD MANUAL: 9 %
NEUTS BAND NFR BLD MANUAL: 1 %
NEUTS BAND NFR BLD MANUAL: 3 %
NEUTS BAND NFR BLD MANUAL: 5 %
NEUTS BAND NFR BLD MANUAL: 5 %
NEUTS BAND NFR BLD MANUAL: 6 %
NITRITE UR QL STRIP: NEGATIVE
NON-SQ EPI CELLS #/AREA URNS AUTO: 4 /HPF
NRBC BLD-RTO: 0 /100 WBC
NRBC BLD-RTO: 1 /100 WBC
NUM UNITS TRANS FFP: NORMAL
OB PNL STL: POSITIVE
OPIATES UR QL SCN: NEGATIVE
OSMOLALITY UR: 304 MOSM/KG (ref 50–1200)
OVALOCYTES BLD QL SMEAR: ABNORMAL
PAPPENHEIMER BOD BLD QL SMEAR: PRESENT
PARAINFLUENZA: NORMAL
PATH REV BLD -IMP: NORMAL
PATH REV BLD -IMP: NORMAL
PCO2 BLDA: 22.2 MMHG (ref 35–45)
PCO2 BLDA: 29.1 MMHG (ref 35–45)
PCO2 BLDA: 29.3 MMHG (ref 35–45)
PCO2 BLDA: 30.4 MMHG (ref 35–45)
PCO2 BLDA: 31 MMHG (ref 35–45)
PCO2 BLDA: 33.6 MMHG (ref 35–45)
PCO2 BLDA: 33.6 MMHG (ref 35–45)
PCO2 BLDA: 33.7 MMHG (ref 35–45)
PCO2 BLDA: 34.3 MMHG (ref 35–45)
PCO2 BLDA: 34.4 MMHG (ref 35–45)
PCO2 BLDA: 34.9 MMHG (ref 35–45)
PCO2 BLDA: 36.9 MMHG (ref 35–45)
PCO2 BLDA: 37.2 MMHG (ref 35–45)
PCO2 BLDA: 38 MMHG (ref 35–45)
PCO2 BLDA: 39.6 MMHG (ref 35–45)
PCO2 BLDA: 40.7 MMHG (ref 35–45)
PCO2 BLDA: 42.5 MMHG (ref 35–45)
PCO2 BLDA: 52 MMHG (ref 35–45)
PCO2 BLDA: 54.1 MMHG (ref 35–45)
PCO2 BLDA: 78.8 MMHG (ref 35–45)
PCP UR QL SCN>25 NG/ML: NEGATIVE
PEEP: 10
PEEP: 5
PEEP: 8
PH SMN: 7.17 [PH] (ref 7.35–7.45)
PH SMN: 7.28 [PH] (ref 7.35–7.45)
PH SMN: 7.32 [PH] (ref 7.35–7.45)
PH SMN: 7.33 [PH] (ref 7.35–7.45)
PH SMN: 7.34 [PH] (ref 7.35–7.45)
PH SMN: 7.34 [PH] (ref 7.35–7.45)
PH SMN: 7.37 [PH] (ref 7.35–7.45)
PH SMN: 7.4 [PH] (ref 7.35–7.45)
PH SMN: 7.41 [PH] (ref 7.35–7.45)
PH SMN: 7.45 [PH] (ref 7.35–7.45)
PH SMN: 7.45 [PH] (ref 7.35–7.45)
PH SMN: 7.47 [PH] (ref 7.35–7.45)
PH SMN: 7.47 [PH] (ref 7.35–7.45)
PH SMN: 7.48 [PH] (ref 7.35–7.45)
PH SMN: 7.51 [PH] (ref 7.35–7.45)
PH SMN: 7.52 [PH] (ref 7.35–7.45)
PH SMN: 7.56 [PH] (ref 7.35–7.45)
PH SMN: 7.56 [PH] (ref 7.35–7.45)
PH SMN: 7.58 [PH] (ref 7.35–7.45)
PH SMN: 7.62 [PH] (ref 7.35–7.45)
PH UR STRIP: 5 [PH] (ref 5–8)
PH UR STRIP: 6 [PH] (ref 5–8)
PH UR STRIP: 7 [PH] (ref 5–8)
PHOSPHATE SERPL-MCNC: 1 MG/DL (ref 2.7–4.5)
PHOSPHATE SERPL-MCNC: 1.2 MG/DL (ref 2.7–4.5)
PHOSPHATE SERPL-MCNC: 1.4 MG/DL (ref 2.7–4.5)
PHOSPHATE SERPL-MCNC: 10.3 MG/DL (ref 2.7–4.5)
PHOSPHATE SERPL-MCNC: 2.2 MG/DL (ref 2.7–4.5)
PHOSPHATE SERPL-MCNC: 2.5 MG/DL (ref 2.7–4.5)
PHOSPHATE SERPL-MCNC: 2.6 MG/DL (ref 2.7–4.5)
PHOSPHATE SERPL-MCNC: 2.7 MG/DL (ref 2.7–4.5)
PHOSPHATE SERPL-MCNC: 2.8 MG/DL (ref 2.7–4.5)
PHOSPHATE SERPL-MCNC: 2.9 MG/DL (ref 2.7–4.5)
PHOSPHATE SERPL-MCNC: 3 MG/DL (ref 2.7–4.5)
PHOSPHATE SERPL-MCNC: 3.2 MG/DL (ref 2.7–4.5)
PHOSPHATE SERPL-MCNC: 3.3 MG/DL (ref 2.7–4.5)
PHOSPHATE SERPL-MCNC: 3.8 MG/DL (ref 2.7–4.5)
PHOSPHATE SERPL-MCNC: 4.2 MG/DL (ref 2.7–4.5)
PHOSPHATE SERPL-MCNC: 4.2 MG/DL (ref 2.7–4.5)
PHOSPHATE SERPL-MCNC: 4.3 MG/DL (ref 2.7–4.5)
PHOSPHATE SERPL-MCNC: 4.4 MG/DL (ref 2.7–4.5)
PHOSPHATE SERPL-MCNC: 4.5 MG/DL (ref 2.7–4.5)
PHOSPHATE SERPL-MCNC: 4.6 MG/DL (ref 2.7–4.5)
PHOSPHATE SERPL-MCNC: 4.6 MG/DL (ref 2.7–4.5)
PHOSPHATE SERPL-MCNC: 4.7 MG/DL (ref 2.7–4.5)
PHOSPHATE SERPL-MCNC: 4.7 MG/DL (ref 2.7–4.5)
PHOSPHATE SERPL-MCNC: 4.8 MG/DL (ref 2.7–4.5)
PHOSPHATE SERPL-MCNC: 4.8 MG/DL (ref 2.7–4.5)
PHOSPHATE SERPL-MCNC: 4.9 MG/DL (ref 2.7–4.5)
PHOSPHATE SERPL-MCNC: 5 MG/DL (ref 2.7–4.5)
PHOSPHATE SERPL-MCNC: 5.1 MG/DL (ref 2.7–4.5)
PHOSPHATE SERPL-MCNC: 5.2 MG/DL (ref 2.7–4.5)
PHOSPHATE SERPL-MCNC: 5.3 MG/DL (ref 2.7–4.5)
PHOSPHATE SERPL-MCNC: 5.4 MG/DL (ref 2.7–4.5)
PHOSPHATE SERPL-MCNC: 5.5 MG/DL (ref 2.7–4.5)
PHOSPHATE SERPL-MCNC: 5.6 MG/DL (ref 2.7–4.5)
PHOSPHATE SERPL-MCNC: 5.6 MG/DL (ref 2.7–4.5)
PHOSPHATE SERPL-MCNC: 5.8 MG/DL (ref 2.7–4.5)
PHOSPHATE SERPL-MCNC: 5.9 MG/DL (ref 2.7–4.5)
PHOSPHATE SERPL-MCNC: 6.2 MG/DL (ref 2.7–4.5)
PHOSPHATE SERPL-MCNC: 6.3 MG/DL (ref 2.7–4.5)
PHOSPHATE SERPL-MCNC: 6.7 MG/DL (ref 2.7–4.5)
PHOSPHATE SERPL-MCNC: 7.2 MG/DL (ref 2.7–4.5)
PHOSPHATE SERPL-MCNC: 8 MG/DL (ref 2.7–4.5)
PHOSPHATE SERPL-MCNC: 8.4 MG/DL (ref 2.7–4.5)
PHOSPHATIDYLETHANOL (PETH): 1670 NG/ML
PIP: 15
PIP: 16
PIP: 17
PIP: 21
PIP: 28
PIP: 8.3
PISA TR MAX VEL: 2.8 M/S
PISA TR MAX VEL: 2.94 M/S
PLATELET # BLD AUTO: 100 K/UL (ref 150–450)
PLATELET # BLD AUTO: 48 K/UL (ref 150–450)
PLATELET # BLD AUTO: 49 K/UL (ref 150–450)
PLATELET # BLD AUTO: 49 K/UL (ref 150–450)
PLATELET # BLD AUTO: 51 K/UL (ref 150–450)
PLATELET # BLD AUTO: 51 K/UL (ref 150–450)
PLATELET # BLD AUTO: 52 K/UL (ref 150–450)
PLATELET # BLD AUTO: 52 K/UL (ref 150–450)
PLATELET # BLD AUTO: 53 K/UL (ref 150–450)
PLATELET # BLD AUTO: 54 K/UL (ref 150–450)
PLATELET # BLD AUTO: 55 K/UL (ref 150–450)
PLATELET # BLD AUTO: 56 K/UL (ref 150–450)
PLATELET # BLD AUTO: 57 K/UL (ref 150–450)
PLATELET # BLD AUTO: 59 K/UL (ref 150–450)
PLATELET # BLD AUTO: 61 K/UL (ref 150–450)
PLATELET # BLD AUTO: 62 K/UL (ref 150–450)
PLATELET # BLD AUTO: 63 K/UL (ref 150–450)
PLATELET # BLD AUTO: 63 K/UL (ref 150–450)
PLATELET # BLD AUTO: 64 K/UL (ref 150–450)
PLATELET # BLD AUTO: 65 K/UL (ref 150–450)
PLATELET # BLD AUTO: 67 K/UL (ref 150–450)
PLATELET # BLD AUTO: 68 K/UL (ref 150–450)
PLATELET # BLD AUTO: 69 K/UL (ref 150–450)
PLATELET # BLD AUTO: 69 K/UL (ref 150–450)
PLATELET # BLD AUTO: 70 K/UL (ref 150–450)
PLATELET # BLD AUTO: 70 K/UL (ref 150–450)
PLATELET # BLD AUTO: 71 K/UL (ref 150–450)
PLATELET # BLD AUTO: 75 K/UL (ref 150–450)
PLATELET # BLD AUTO: 76 K/UL (ref 150–450)
PLATELET # BLD AUTO: 77 K/UL (ref 150–450)
PLATELET # BLD AUTO: 77 K/UL (ref 150–450)
PLATELET # BLD AUTO: 78 K/UL (ref 150–450)
PLATELET # BLD AUTO: 81 K/UL (ref 150–450)
PLATELET # BLD AUTO: 81 K/UL (ref 150–450)
PLATELET # BLD AUTO: 82 K/UL (ref 150–450)
PLATELET # BLD AUTO: 85 K/UL (ref 150–450)
PLATELET # BLD AUTO: 85 K/UL (ref 150–450)
PLATELET # BLD AUTO: 86 K/UL (ref 150–450)
PLATELET # BLD AUTO: 87 K/UL (ref 150–450)
PLATELET # BLD AUTO: 89 K/UL (ref 150–450)
PLATELET BLD QL SMEAR: ABNORMAL
PMV BLD AUTO: 11.8 FL (ref 9.2–12.9)
PMV BLD AUTO: 11.8 FL (ref 9.2–12.9)
PMV BLD AUTO: 11.9 FL (ref 9.2–12.9)
PMV BLD AUTO: 12 FL (ref 9.2–12.9)
PMV BLD AUTO: 12.1 FL (ref 9.2–12.9)
PMV BLD AUTO: 12.2 FL (ref 9.2–12.9)
PMV BLD AUTO: 12.3 FL (ref 9.2–12.9)
PMV BLD AUTO: 12.3 FL (ref 9.2–12.9)
PMV BLD AUTO: 12.4 FL (ref 9.2–12.9)
PMV BLD AUTO: 12.4 FL (ref 9.2–12.9)
PMV BLD AUTO: 12.5 FL (ref 9.2–12.9)
PMV BLD AUTO: 12.6 FL (ref 9.2–12.9)
PMV BLD AUTO: 12.7 FL (ref 9.2–12.9)
PMV BLD AUTO: 12.8 FL (ref 9.2–12.9)
PMV BLD AUTO: 12.9 FL (ref 9.2–12.9)
PMV BLD AUTO: 13 FL (ref 9.2–12.9)
PMV BLD AUTO: 13.1 FL (ref 9.2–12.9)
PMV BLD AUTO: 13.2 FL (ref 9.2–12.9)
PMV BLD AUTO: 13.4 FL (ref 9.2–12.9)
PMV BLD AUTO: 13.5 FL (ref 9.2–12.9)
PMV BLD AUTO: 13.5 FL (ref 9.2–12.9)
PMV BLD AUTO: 13.9 FL (ref 9.2–12.9)
PMV BLD AUTO: 14 FL (ref 9.2–12.9)
PMV BLD AUTO: 14.2 FL (ref 9.2–12.9)
PMV BLD AUTO: 14.2 FL (ref 9.2–12.9)
PMV BLD AUTO: 14.5 FL (ref 9.2–12.9)
PMV BLD AUTO: 14.5 FL (ref 9.2–12.9)
PMV BLD AUTO: 14.6 FL (ref 9.2–12.9)
PMV BLD AUTO: 14.6 FL (ref 9.2–12.9)
PMV BLD AUTO: 14.7 FL (ref 9.2–12.9)
PMV BLD AUTO: 14.8 FL (ref 9.2–12.9)
PMV BLD AUTO: 14.9 FL (ref 9.2–12.9)
PMV BLD AUTO: 15 FL (ref 9.2–12.9)
PMV BLD AUTO: ABNORMAL FL (ref 9.2–12.9)
PO2 BLDA: 106 MMHG (ref 80–100)
PO2 BLDA: 267 MMHG (ref 80–100)
PO2 BLDA: 28 MMHG (ref 40–60)
PO2 BLDA: 37 MMHG (ref 40–60)
PO2 BLDA: 38 MMHG (ref 40–60)
PO2 BLDA: 40 MMHG (ref 40–60)
PO2 BLDA: 46 MMHG (ref 40–60)
PO2 BLDA: 48 MMHG (ref 40–60)
PO2 BLDA: 49 MMHG (ref 80–100)
PO2 BLDA: 55 MMHG (ref 80–100)
PO2 BLDA: 56 MMHG (ref 80–100)
PO2 BLDA: 63 MMHG (ref 80–100)
PO2 BLDA: 64 MMHG (ref 80–100)
PO2 BLDA: 67 MMHG (ref 80–100)
PO2 BLDA: 67 MMHG (ref 80–100)
PO2 BLDA: 71 MMHG (ref 80–100)
PO2 BLDA: 73 MMHG (ref 80–100)
PO2 BLDA: 74 MMHG (ref 80–100)
PO2 BLDA: 76 MMHG (ref 40–60)
PO2 BLDA: 81 MMHG (ref 80–100)
POC BE: -1 MMOL/L
POC BE: -1 MMOL/L
POC BE: -2 MMOL/L
POC BE: -3 MMOL/L
POC BE: -3 MMOL/L
POC BE: -4 MMOL/L
POC BE: -4 MMOL/L
POC BE: -9 MMOL/L
POC BE: 0 MMOL/L
POC BE: 0 MMOL/L
POC BE: 1 MMOL/L
POC BE: 11 MMOL/L
POC BE: 14 MMOL/L
POC BE: 2 MMOL/L
POC BE: 4 MMOL/L
POC BE: 5 MMOL/L
POC IONIZED CALCIUM: 0.89 MMOL/L (ref 1.06–1.42)
POC SATURATED O2: 100 % (ref 95–100)
POC SATURATED O2: 59 % (ref 95–100)
POC SATURATED O2: 72 % (ref 95–100)
POC SATURATED O2: 76 % (ref 95–100)
POC SATURATED O2: 76 % (ref 95–100)
POC SATURATED O2: 79 % (ref 95–100)
POC SATURATED O2: 81 % (ref 95–100)
POC SATURATED O2: 86 % (ref 95–100)
POC SATURATED O2: 86 % (ref 95–100)
POC SATURATED O2: 87 % (ref 95–100)
POC SATURATED O2: 93 % (ref 95–100)
POC SATURATED O2: 93 % (ref 95–100)
POC SATURATED O2: 94 % (ref 95–100)
POC SATURATED O2: 95 % (ref 95–100)
POC SATURATED O2: 96 % (ref 95–100)
POC SATURATED O2: 96 % (ref 95–100)
POC SATURATED O2: 98 % (ref 95–100)
POC SATURATED O2: 99 % (ref 95–100)
POC TCO2 (MEASURED): 18 MMOL/L (ref 23–29)
POC TCO2: 16 MMOL/L (ref 23–27)
POC TCO2: 23 MMOL/L (ref 23–27)
POC TCO2: 23 MMOL/L (ref 23–27)
POC TCO2: 23 MMOL/L (ref 24–29)
POC TCO2: 24 MMOL/L (ref 23–27)
POC TCO2: 24 MMOL/L (ref 24–29)
POC TCO2: 25 MMOL/L (ref 23–27)
POC TCO2: 25 MMOL/L (ref 24–29)
POC TCO2: 26 MMOL/L (ref 24–29)
POC TCO2: 26 MMOL/L (ref 24–29)
POC TCO2: 27 MMOL/L (ref 23–27)
POC TCO2: 27 MMOL/L (ref 23–27)
POC TCO2: 28 MMOL/L (ref 23–27)
POC TCO2: 28 MMOL/L (ref 23–27)
POC TCO2: 29 MMOL/L (ref 23–27)
POC TCO2: 31 MMOL/L (ref 23–27)
POC TCO2: 34 MMOL/L (ref 23–27)
POC TCO2: 36 MMOL/L (ref 23–27)
POCT GLUCOSE: 100 MG/DL (ref 70–110)
POCT GLUCOSE: 103 MG/DL (ref 70–110)
POCT GLUCOSE: 106 MG/DL (ref 70–110)
POCT GLUCOSE: 107 MG/DL (ref 70–110)
POCT GLUCOSE: 110 MG/DL (ref 70–110)
POCT GLUCOSE: 131 MG/DL (ref 70–110)
POCT GLUCOSE: 131 MG/DL (ref 70–110)
POCT GLUCOSE: 134 MG/DL (ref 70–110)
POCT GLUCOSE: 135 MG/DL (ref 70–110)
POCT GLUCOSE: 136 MG/DL (ref 70–110)
POCT GLUCOSE: 140 MG/DL (ref 70–110)
POCT GLUCOSE: 141 MG/DL (ref 70–110)
POCT GLUCOSE: 141 MG/DL (ref 70–110)
POCT GLUCOSE: 147 MG/DL (ref 70–110)
POCT GLUCOSE: 152 MG/DL (ref 70–110)
POCT GLUCOSE: 152 MG/DL (ref 70–110)
POCT GLUCOSE: 160 MG/DL (ref 70–110)
POCT GLUCOSE: 160 MG/DL (ref 70–110)
POCT GLUCOSE: 164 MG/DL (ref 70–110)
POCT GLUCOSE: 165 MG/DL (ref 70–110)
POCT GLUCOSE: 167 MG/DL (ref 70–110)
POCT GLUCOSE: 170 MG/DL (ref 70–110)
POCT GLUCOSE: 170 MG/DL (ref 70–110)
POCT GLUCOSE: 171 MG/DL (ref 70–110)
POCT GLUCOSE: 172 MG/DL (ref 70–110)
POCT GLUCOSE: 173 MG/DL (ref 70–110)
POCT GLUCOSE: 173 MG/DL (ref 70–110)
POCT GLUCOSE: 176 MG/DL (ref 70–110)
POCT GLUCOSE: 182 MG/DL (ref 70–110)
POCT GLUCOSE: 183 MG/DL (ref 70–110)
POCT GLUCOSE: 184 MG/DL (ref 70–110)
POCT GLUCOSE: 187 MG/DL (ref 70–110)
POCT GLUCOSE: 187 MG/DL (ref 70–110)
POCT GLUCOSE: 192 MG/DL (ref 70–110)
POCT GLUCOSE: 193 MG/DL (ref 70–110)
POCT GLUCOSE: 194 MG/DL (ref 70–110)
POCT GLUCOSE: 200 MG/DL (ref 70–110)
POCT GLUCOSE: 202 MG/DL (ref 70–110)
POCT GLUCOSE: 203 MG/DL (ref 70–110)
POCT GLUCOSE: 210 MG/DL (ref 70–110)
POCT GLUCOSE: 215 MG/DL (ref 70–110)
POCT GLUCOSE: 217 MG/DL (ref 70–110)
POCT GLUCOSE: 218 MG/DL (ref 70–110)
POCT GLUCOSE: 223 MG/DL (ref 70–110)
POCT GLUCOSE: 224 MG/DL (ref 70–110)
POCT GLUCOSE: 224 MG/DL (ref 70–110)
POCT GLUCOSE: 225 MG/DL (ref 70–110)
POCT GLUCOSE: 225 MG/DL (ref 70–110)
POCT GLUCOSE: 230 MG/DL (ref 70–110)
POCT GLUCOSE: 233 MG/DL (ref 70–110)
POCT GLUCOSE: 234 MG/DL (ref 70–110)
POCT GLUCOSE: 238 MG/DL (ref 70–110)
POCT GLUCOSE: 240 MG/DL (ref 70–110)
POCT GLUCOSE: 246 MG/DL (ref 70–110)
POCT GLUCOSE: 254 MG/DL (ref 70–110)
POCT GLUCOSE: 260 MG/DL (ref 70–110)
POCT GLUCOSE: 295 MG/DL (ref 70–110)
POCT GLUCOSE: 60 MG/DL (ref 70–110)
POCT GLUCOSE: 63 MG/DL (ref 70–110)
POCT GLUCOSE: 79 MG/DL (ref 70–110)
POCT GLUCOSE: 81 MG/DL (ref 70–110)
POCT GLUCOSE: 88 MG/DL (ref 70–110)
POCT GLUCOSE: 89 MG/DL (ref 70–110)
POCT GLUCOSE: 89 MG/DL (ref 70–110)
POCT GLUCOSE: 91 MG/DL (ref 70–110)
POCT GLUCOSE: 96 MG/DL (ref 70–110)
POCT GLUCOSE: 98 MG/DL (ref 70–110)
POCT GLUCOSE: 99 MG/DL (ref 70–110)
POIKILOCYTOSIS BLD QL SMEAR: ABNORMAL
POIKILOCYTOSIS BLD QL SMEAR: ABNORMAL
POIKILOCYTOSIS BLD QL SMEAR: SLIGHT
POLYCHROMASIA BLD QL SMEAR: ABNORMAL
POTASSIUM BLD-SCNC: 3.3 MMOL/L (ref 3.5–5.1)
POTASSIUM SERPL-SCNC: 2.8 MMOL/L (ref 3.5–5.1)
POTASSIUM SERPL-SCNC: 3 MMOL/L (ref 3.5–5.1)
POTASSIUM SERPL-SCNC: 3.1 MMOL/L (ref 3.5–5.1)
POTASSIUM SERPL-SCNC: 3.2 MMOL/L (ref 3.5–5.1)
POTASSIUM SERPL-SCNC: 3.3 MMOL/L (ref 3.5–5.1)
POTASSIUM SERPL-SCNC: 3.4 MMOL/L (ref 3.5–5.1)
POTASSIUM SERPL-SCNC: 3.5 MMOL/L (ref 3.5–5.1)
POTASSIUM SERPL-SCNC: 3.6 MMOL/L (ref 3.5–5.1)
POTASSIUM SERPL-SCNC: 3.7 MMOL/L (ref 3.5–5.1)
POTASSIUM SERPL-SCNC: 3.7 MMOL/L (ref 3.5–5.1)
POTASSIUM SERPL-SCNC: 3.8 MMOL/L (ref 3.5–5.1)
POTASSIUM SERPL-SCNC: 3.9 MMOL/L (ref 3.5–5.1)
POTASSIUM SERPL-SCNC: 3.9 MMOL/L (ref 3.5–5.1)
POTASSIUM SERPL-SCNC: 4 MMOL/L (ref 3.5–5.1)
POTASSIUM SERPL-SCNC: 4.1 MMOL/L (ref 3.5–5.1)
POTASSIUM SERPL-SCNC: 4.2 MMOL/L (ref 3.5–5.1)
POTASSIUM SERPL-SCNC: 4.3 MMOL/L (ref 3.5–5.1)
POTASSIUM SERPL-SCNC: 4.4 MMOL/L (ref 3.5–5.1)
POTASSIUM SERPL-SCNC: 4.5 MMOL/L (ref 3.5–5.1)
POTASSIUM SERPL-SCNC: 4.6 MMOL/L (ref 3.5–5.1)
POTASSIUM SERPL-SCNC: 4.7 MMOL/L (ref 3.5–5.1)
POTASSIUM SERPL-SCNC: 4.8 MMOL/L (ref 3.5–5.1)
POTASSIUM SERPL-SCNC: 4.9 MMOL/L (ref 3.5–5.1)
POTASSIUM SERPL-SCNC: 5.2 MMOL/L (ref 3.5–5.1)
POTASSIUM SERPL-SCNC: 5.2 MMOL/L (ref 3.5–5.1)
POTASSIUM SERPL-SCNC: 5.3 MMOL/L (ref 3.5–5.1)
POTASSIUM SERPL-SCNC: 5.6 MMOL/L (ref 3.5–5.1)
POTASSIUM SERPL-SCNC: 5.6 MMOL/L (ref 3.5–5.1)
PROCALCITONIN SERPL IA-MCNC: 0.43 NG/ML
PROMYELOCYTES NFR BLD MANUAL: 1 %
PROT FLD-MCNC: <1 G/DL
PROT FLD-MCNC: <1 G/DL
PROT SERPL-MCNC: 4.9 G/DL (ref 6–8.4)
PROT SERPL-MCNC: 5 G/DL (ref 6–8.4)
PROT SERPL-MCNC: 5.1 G/DL (ref 6–8.4)
PROT SERPL-MCNC: 5.2 G/DL (ref 6–8.4)
PROT SERPL-MCNC: 5.2 G/DL (ref 6–8.4)
PROT SERPL-MCNC: 5.3 G/DL (ref 6–8.4)
PROT SERPL-MCNC: 5.4 G/DL (ref 6–8.4)
PROT SERPL-MCNC: 5.4 G/DL (ref 6–8.4)
PROT SERPL-MCNC: 5.5 G/DL (ref 6–8.4)
PROT SERPL-MCNC: 5.5 G/DL (ref 6–8.4)
PROT SERPL-MCNC: 5.6 G/DL (ref 6–8.4)
PROT SERPL-MCNC: 5.7 G/DL (ref 6–8.4)
PROT SERPL-MCNC: 5.8 G/DL (ref 6–8.4)
PROT SERPL-MCNC: 5.9 G/DL (ref 6–8.4)
PROT SERPL-MCNC: 6 G/DL (ref 6–8.4)
PROT SERPL-MCNC: 6.1 G/DL (ref 6–8.4)
PROT SERPL-MCNC: 6.2 G/DL (ref 6–8.4)
PROT UR QL STRIP: ABNORMAL
PROT UR QL STRIP: NEGATIVE
PROT UR QL STRIP: NEGATIVE
PROT UR-MCNC: 136 MG/DL (ref 0–15)
PROT/CREAT UR: 1.18 MG/G{CREAT} (ref 0–0.2)
PROTHROMBIN TIME: 19.3 SEC (ref 9–12.5)
PROTHROMBIN TIME: 19.7 SEC (ref 9–12.5)
PROTHROMBIN TIME: 20 SEC (ref 9–12.5)
PROTHROMBIN TIME: 20.6 SEC (ref 9–12.5)
PROTHROMBIN TIME: 21.2 SEC (ref 9–12.5)
PROTHROMBIN TIME: 21.3 SEC (ref 9–12.5)
PROTHROMBIN TIME: 21.4 SEC (ref 9–12.5)
PROTHROMBIN TIME: 21.4 SEC (ref 9–12.5)
PROTHROMBIN TIME: 21.8 SEC (ref 9–12.5)
PROTHROMBIN TIME: 22 SEC (ref 9–12.5)
PROTHROMBIN TIME: 22.1 SEC (ref 9–12.5)
PROTHROMBIN TIME: 22.4 SEC (ref 9–12.5)
PROTHROMBIN TIME: 23.4 SEC (ref 9–12.5)
PROTHROMBIN TIME: 28.7 SEC (ref 9–12.5)
PROTHROMBIN TIME: 30.1 SEC (ref 9–12.5)
PROTHROMBIN TIME: 30.4 SEC (ref 9–12.5)
PROTHROMBIN TIME: 31.9 SEC (ref 9–12.5)
PROTHROMBIN TIME: 33.7 SEC (ref 9–12.5)
PROTHROMBIN TIME: 36.5 SEC (ref 9–12.5)
PROTHROMBIN TIME: 39 SEC (ref 9–12.5)
PROTHROMBIN TIME: 50.6 SEC (ref 9–12.5)
PROTHROMBIN TIME: 51 SEC (ref 9–12.5)
RA MAJOR: 4.18 CM
RA PRESSURE: 3 MMHG
RA WIDTH: 2.49 CM
RBC # BLD AUTO: 1.88 M/UL (ref 4.6–6.2)
RBC # BLD AUTO: 1.88 M/UL (ref 4.6–6.2)
RBC # BLD AUTO: 1.95 M/UL (ref 4.6–6.2)
RBC # BLD AUTO: 2.01 M/UL (ref 4.6–6.2)
RBC # BLD AUTO: 2.02 M/UL (ref 4.6–6.2)
RBC # BLD AUTO: 2.03 M/UL (ref 4.6–6.2)
RBC # BLD AUTO: 2.05 M/UL (ref 4.6–6.2)
RBC # BLD AUTO: 2.07 M/UL (ref 4.6–6.2)
RBC # BLD AUTO: 2.08 M/UL (ref 4.6–6.2)
RBC # BLD AUTO: 2.09 M/UL (ref 4.6–6.2)
RBC # BLD AUTO: 2.1 M/UL (ref 4.6–6.2)
RBC # BLD AUTO: 2.12 M/UL (ref 4.6–6.2)
RBC # BLD AUTO: 2.15 M/UL (ref 4.6–6.2)
RBC # BLD AUTO: 2.15 M/UL (ref 4.6–6.2)
RBC # BLD AUTO: 2.16 M/UL (ref 4.6–6.2)
RBC # BLD AUTO: 2.16 M/UL (ref 4.6–6.2)
RBC # BLD AUTO: 2.2 M/UL (ref 4.6–6.2)
RBC # BLD AUTO: 2.2 M/UL (ref 4.6–6.2)
RBC # BLD AUTO: 2.21 M/UL (ref 4.6–6.2)
RBC # BLD AUTO: 2.27 M/UL (ref 4.6–6.2)
RBC # BLD AUTO: 2.3 M/UL (ref 4.6–6.2)
RBC # BLD AUTO: 2.33 M/UL (ref 4.6–6.2)
RBC # BLD AUTO: 2.35 M/UL (ref 4.6–6.2)
RBC # BLD AUTO: 2.35 M/UL (ref 4.6–6.2)
RBC # BLD AUTO: 2.36 M/UL (ref 4.6–6.2)
RBC # BLD AUTO: 2.36 M/UL (ref 4.6–6.2)
RBC # BLD AUTO: 2.38 M/UL (ref 4.6–6.2)
RBC # BLD AUTO: 2.39 M/UL (ref 4.6–6.2)
RBC # BLD AUTO: 2.39 M/UL (ref 4.6–6.2)
RBC # BLD AUTO: 2.4 M/UL (ref 4.6–6.2)
RBC # BLD AUTO: 2.42 M/UL (ref 4.6–6.2)
RBC # BLD AUTO: 2.44 M/UL (ref 4.6–6.2)
RBC # BLD AUTO: 2.45 M/UL (ref 4.6–6.2)
RBC # BLD AUTO: 2.53 M/UL (ref 4.6–6.2)
RBC # BLD AUTO: 2.54 M/UL (ref 4.6–6.2)
RBC # BLD AUTO: 2.55 M/UL (ref 4.6–6.2)
RBC # BLD AUTO: 2.56 M/UL (ref 4.6–6.2)
RBC # BLD AUTO: 2.59 M/UL (ref 4.6–6.2)
RBC # BLD AUTO: 2.61 M/UL (ref 4.6–6.2)
RBC # BLD AUTO: 2.62 M/UL (ref 4.6–6.2)
RBC # BLD AUTO: 2.63 M/UL (ref 4.6–6.2)
RBC # BLD AUTO: 2.64 M/UL (ref 4.6–6.2)
RBC # BLD AUTO: 2.66 M/UL (ref 4.6–6.2)
RBC # BLD AUTO: 2.66 M/UL (ref 4.6–6.2)
RBC # BLD AUTO: 2.68 M/UL (ref 4.6–6.2)
RBC # BLD AUTO: 2.72 M/UL (ref 4.6–6.2)
RBC # BLD AUTO: 2.78 M/UL (ref 4.6–6.2)
RBC #/AREA URNS AUTO: 75 /HPF (ref 0–4)
RBC #/AREA URNS AUTO: 97 /HPF (ref 0–4)
RETICS/RBC NFR AUTO: 7.3 % (ref 0.4–2)
RIGHT VENTRICULAR END-DIASTOLIC DIMENSION: 2.78 CM
RPR SER QL: NORMAL
RVP - ADENOVIRUS: NOT DETECTED
RVP - HUMAN METAPNEUMOVIRUS (HMPV): NOT DETECTED
RVP - INFLUENZA A: NOT DETECTED
RVP - INFLUENZA B: NOT DETECTED
RVP - RESPIRATORY SYNCTIAL VIRUS (RSV) A: NOT DETECTED
RVP - RESPIRATORY VIRAL PANEL, SOURCE: NORMAL
SAMPLE: ABNORMAL
SARS-COV-2 RDRP RESP QL NAA+PROBE: NEGATIVE
SATURATED IRON: 117 % (ref 20–50)
SCHISTOCYTES BLD QL SMEAR: ABNORMAL
SCHISTOCYTES BLD QL SMEAR: PRESENT
SINUS: 2.66 CM
SINUS: 2.78 CM
SITE: ABNORMAL
SMOOTH MUSCLE AB TITR SER IF: ABNORMAL {TITER}
SMUDGE CELLS BLD QL SMEAR: PRESENT
SODIUM BLD-SCNC: 135 MMOL/L (ref 136–145)
SODIUM SERPL-SCNC: 127 MMOL/L (ref 136–145)
SODIUM SERPL-SCNC: 127 MMOL/L (ref 136–145)
SODIUM SERPL-SCNC: 130 MMOL/L (ref 136–145)
SODIUM SERPL-SCNC: 130 MMOL/L (ref 136–145)
SODIUM SERPL-SCNC: 131 MMOL/L (ref 136–145)
SODIUM SERPL-SCNC: 132 MMOL/L (ref 136–145)
SODIUM SERPL-SCNC: 132 MMOL/L (ref 136–145)
SODIUM SERPL-SCNC: 133 MMOL/L (ref 136–145)
SODIUM SERPL-SCNC: 134 MMOL/L (ref 136–145)
SODIUM SERPL-SCNC: 135 MMOL/L (ref 136–145)
SODIUM SERPL-SCNC: 136 MMOL/L (ref 136–145)
SODIUM SERPL-SCNC: 137 MMOL/L (ref 136–145)
SODIUM SERPL-SCNC: 138 MMOL/L (ref 136–145)
SODIUM SERPL-SCNC: 139 MMOL/L (ref 136–145)
SODIUM SERPL-SCNC: 140 MMOL/L (ref 136–145)
SODIUM SERPL-SCNC: 141 MMOL/L (ref 136–145)
SODIUM SERPL-SCNC: 142 MMOL/L (ref 136–145)
SODIUM SERPL-SCNC: 143 MMOL/L (ref 136–145)
SODIUM SERPL-SCNC: 143 MMOL/L (ref 136–145)
SODIUM SERPL-SCNC: 144 MMOL/L (ref 136–145)
SODIUM SERPL-SCNC: 144 MMOL/L (ref 136–145)
SODIUM SERPL-SCNC: 146 MMOL/L (ref 136–145)
SODIUM SERPL-SCNC: 146 MMOL/L (ref 136–145)
SODIUM UR-SCNC: <20 MMOL/L (ref 20–250)
SODIUM UR-SCNC: <20 MMOL/L (ref 20–250)
SP GR UR STRIP: 1.01 (ref 1–1.03)
SP GR UR STRIP: 1.01 (ref 1–1.03)
SP GR UR STRIP: >=1.03 (ref 1–1.03)
SP02: 100
SP02: 90
SP02: 92
SP02: 93
SP02: 93
SP02: 94
SP02: 95
SP02: 95
SP02: 97
SP02: 98
SP02: 99
SP02: 99
SPECIMEN SOURCE: NORMAL
SPHEROCYTES BLD QL SMEAR: ABNORMAL
SQUAMOUS #/AREA URNS AUTO: 4 /HPF
STJ: 2.25 CM
STJ: 2.69 CM
T4 FREE SERPL-MCNC: 1.29 NG/DL (ref 0.71–1.51)
TARGETS BLD QL SMEAR: ABNORMAL
TB GOLD PLUS: ABNORMAL
TB2 - NIL: -0.01 IU/ML
TDI LATERAL: 0.14 M/S
TDI SEPTAL: 0.1 M/S
TDI: 0.12 M/S
TEM - ACINETOBACTER BAUMANNII: NOT DETECTED
TEM - BORDETELLA PERTUSSIS: NOT DETECTED
TEM - CHLAMYDOPHILA PNEUMONIAE: NOT DETECTED
TEM - KLEBSIELLA PNEUMONIAE: NOT DETECTED
TEM - MRSA: NOT DETECTED
TEM - MYCOPLASMA PNEUMONIAE: NOT DETECTED
TEM - NEISSERIA MENINGITIDIS: NOT DETECTED
TEM - PANTON-VALENTINE: NOT DETECTED
TEM - PSEUDOMONAS AERUGINOSA: NOT DETECTED
TEM - STAPHYLOCOCCUS AUREUS: NOT DETECTED
TEM - STREPTOCOCCUS PNEUMONIAE: NOT DETECTED
TEM - STREPTOCOCCUS PYOGENES A: NOT DETECTED
TEM- HAEMOPHILUS INFLUENZAE B: NOT DETECTED
TEM- HAEMOPHILUS INFLUENZAE: NOT DETECTED
TOTAL IRON BINDING CAPACITY: 52 UG/DL (ref 250–450)
TOXIC GRANULES BLD QL SMEAR: PRESENT
TOXICOLOGY INFORMATION: NORMAL
TR MAX PG: 31 MMHG
TR MAX PG: 35 MMHG
TRANS ERYTHROCYTES VOL PATIENT: NORMAL ML
TRANSFERRIN SERPL-MCNC: 35 MG/DL (ref 200–375)
TRICUSPID ANNULAR PLANE SYSTOLIC EXCURSION: 1.66 CM
TSH SERPL DL<=0.005 MIU/L-ACNC: 0.03 UIU/ML (ref 0.4–4)
TV REST PULMONARY ARTERY PRESSURE: 34 MMHG
URN SPEC COLLECT METH UR: ABNORMAL
URN SPEC COLLECT METH UR: ABNORMAL
URN SPEC COLLECT METH UR: NORMAL
UUN UR-MCNC: 59 MG/DL (ref 140–1050)
VANCOMYCIN SERPL-MCNC: 12.4 UG/ML
VANCOMYCIN SERPL-MCNC: 13.6 UG/ML
VANCOMYCIN SERPL-MCNC: 14.9 UG/ML
VANCOMYCIN SERPL-MCNC: 15.8 UG/ML
VANCOMYCIN SERPL-MCNC: 16 UG/ML
VANCOMYCIN SERPL-MCNC: 16.5 UG/ML
VANCOMYCIN SERPL-MCNC: 17.4 UG/ML
VANCOMYCIN SERPL-MCNC: 17.8 UG/ML
VANCOMYCIN SERPL-MCNC: 18.6 UG/ML
VANCOMYCIN SERPL-MCNC: 19.6 UG/ML
VANCOMYCIN SERPL-MCNC: 20.1 UG/ML
VANCOMYCIN SERPL-MCNC: 23.5 UG/ML
VANCOMYCIN SERPL-MCNC: 24.8 UG/ML
VANCOMYCIN SERPL-MCNC: 26.5 UG/ML
VANCOMYCIN SERPL-MCNC: 29.4 UG/ML
VANCOMYCIN SERPL-MCNC: 9.8 UG/ML
VANCOMYCIN TROUGH SERPL-MCNC: 32.8 UG/ML (ref 10–22)
VIT B1 BLD-MCNC: 90 UG/L (ref 38–122)
VIT B12 SERPL-MCNC: >2000 PG/ML (ref 210–950)
VT: 320
VT: 35
VT: 360
VT: 400
VT: 471
WBC # BLD AUTO: 12.61 K/UL (ref 3.9–12.7)
WBC # BLD AUTO: 13.76 K/UL (ref 3.9–12.7)
WBC # BLD AUTO: 14.8 K/UL (ref 3.9–12.7)
WBC # BLD AUTO: 15.15 K/UL (ref 3.9–12.7)
WBC # BLD AUTO: 16.01 K/UL (ref 3.9–12.7)
WBC # BLD AUTO: 17.27 K/UL (ref 3.9–12.7)
WBC # BLD AUTO: 17.61 K/UL (ref 3.9–12.7)
WBC # BLD AUTO: 18.33 K/UL (ref 3.9–12.7)
WBC # BLD AUTO: 19.91 K/UL (ref 3.9–12.7)
WBC # BLD AUTO: 20.59 K/UL (ref 3.9–12.7)
WBC # BLD AUTO: 21.14 K/UL (ref 3.9–12.7)
WBC # BLD AUTO: 22.43 K/UL (ref 3.9–12.7)
WBC # BLD AUTO: 24.08 K/UL (ref 3.9–12.7)
WBC # BLD AUTO: 24.34 K/UL (ref 3.9–12.7)
WBC # BLD AUTO: 25.36 K/UL (ref 3.9–12.7)
WBC # BLD AUTO: 25.48 K/UL (ref 3.9–12.7)
WBC # BLD AUTO: 25.95 K/UL (ref 3.9–12.7)
WBC # BLD AUTO: 29.71 K/UL (ref 3.9–12.7)
WBC # BLD AUTO: 30.83 K/UL (ref 3.9–12.7)
WBC # BLD AUTO: 33.34 K/UL (ref 3.9–12.7)
WBC # BLD AUTO: 33.67 K/UL (ref 3.9–12.7)
WBC # BLD AUTO: 35 K/UL (ref 3.9–12.7)
WBC # BLD AUTO: 36.55 K/UL (ref 3.9–12.7)
WBC # BLD AUTO: 36.76 K/UL (ref 3.9–12.7)
WBC # BLD AUTO: 37.43 K/UL (ref 3.9–12.7)
WBC # BLD AUTO: 37.69 K/UL (ref 3.9–12.7)
WBC # BLD AUTO: 39.15 K/UL (ref 3.9–12.7)
WBC # BLD AUTO: 39.43 K/UL (ref 3.9–12.7)
WBC # BLD AUTO: 39.89 K/UL (ref 3.9–12.7)
WBC # BLD AUTO: 39.91 K/UL (ref 3.9–12.7)
WBC # BLD AUTO: 40.14 K/UL (ref 3.9–12.7)
WBC # BLD AUTO: 41.93 K/UL (ref 3.9–12.7)
WBC # BLD AUTO: 42.23 K/UL (ref 3.9–12.7)
WBC # BLD AUTO: 43.41 K/UL (ref 3.9–12.7)
WBC # BLD AUTO: 44.38 K/UL (ref 3.9–12.7)
WBC # BLD AUTO: 44.93 K/UL (ref 3.9–12.7)
WBC # BLD AUTO: 49.75 K/UL (ref 3.9–12.7)
WBC # BLD AUTO: 52.31 K/UL (ref 3.9–12.7)
WBC # BLD AUTO: 52.9 K/UL (ref 3.9–12.7)
WBC # BLD AUTO: 53.53 K/UL (ref 3.9–12.7)
WBC # BLD AUTO: 53.71 K/UL (ref 3.9–12.7)
WBC # BLD AUTO: 54.16 K/UL (ref 3.9–12.7)
WBC # BLD AUTO: 55.76 K/UL (ref 3.9–12.7)
WBC # BLD AUTO: 56.32 K/UL (ref 3.9–12.7)
WBC # BLD AUTO: 57.29 K/UL (ref 3.9–12.7)
WBC # BLD AUTO: 58.18 K/UL (ref 3.9–12.7)
WBC # BLD AUTO: 60.57 K/UL (ref 3.9–12.7)
WBC # BLD AUTO: 60.9 K/UL (ref 3.9–12.7)
WBC # BLD AUTO: 62.57 K/UL (ref 3.9–12.7)
WBC # BLD AUTO: 62.76 K/UL (ref 3.9–12.7)
WBC # BLD AUTO: 64.26 K/UL (ref 3.9–12.7)
WBC # BLD AUTO: 64.62 K/UL (ref 3.9–12.7)
WBC # BLD AUTO: 65.98 K/UL (ref 3.9–12.7)
WBC # FLD: 16 /CU MM
WBC # FLD: 83 /CU MM
WBC #/AREA URNS AUTO: 35 /HPF (ref 0–5)
WBC #/AREA URNS AUTO: >100 /HPF (ref 0–5)
WBC TOXIC VACUOLES BLD QL SMEAR: PRESENT

## 2021-01-01 PROCEDURE — 99232 SBSQ HOSP IP/OBS MODERATE 35: CPT | Mod: ,,, | Performed by: INTERNAL MEDICINE

## 2021-01-01 PROCEDURE — 36620 ARTERIAL LINE: ICD-10-PCS | Mod: ,,, | Performed by: STUDENT IN AN ORGANIZED HEALTH CARE EDUCATION/TRAINING PROGRAM

## 2021-01-01 PROCEDURE — 85027 COMPLETE CBC AUTOMATED: CPT | Mod: 91 | Performed by: STUDENT IN AN ORGANIZED HEALTH CARE EDUCATION/TRAINING PROGRAM

## 2021-01-01 PROCEDURE — 36620 INSERTION CATHETER ARTERY: CPT | Mod: ,,, | Performed by: NURSE PRACTITIONER

## 2021-01-01 PROCEDURE — 90945 DIALYSIS ONE EVALUATION: CPT

## 2021-01-01 PROCEDURE — 63600175 PHARM REV CODE 636 W HCPCS

## 2021-01-01 PROCEDURE — 85007 BL SMEAR W/DIFF WBC COUNT: CPT | Performed by: INTERNAL MEDICINE

## 2021-01-01 PROCEDURE — 99233 SBSQ HOSP IP/OBS HIGH 50: CPT | Mod: ,,, | Performed by: INTERNAL MEDICINE

## 2021-01-01 PROCEDURE — 99900026 HC AIRWAY MAINTENANCE (STAT)

## 2021-01-01 PROCEDURE — 25000003 PHARM REV CODE 250: Performed by: STUDENT IN AN ORGANIZED HEALTH CARE EDUCATION/TRAINING PROGRAM

## 2021-01-01 PROCEDURE — 99291 CRITICAL CARE FIRST HOUR: CPT | Mod: ,,, | Performed by: INTERNAL MEDICINE

## 2021-01-01 PROCEDURE — 20000000 HC ICU ROOM

## 2021-01-01 PROCEDURE — 80053 COMPREHEN METABOLIC PANEL: CPT | Performed by: STUDENT IN AN ORGANIZED HEALTH CARE EDUCATION/TRAINING PROGRAM

## 2021-01-01 PROCEDURE — 85007 BL SMEAR W/DIFF WBC COUNT: CPT | Performed by: STUDENT IN AN ORGANIZED HEALTH CARE EDUCATION/TRAINING PROGRAM

## 2021-01-01 PROCEDURE — 25000003 PHARM REV CODE 250: Performed by: INTERNAL MEDICINE

## 2021-01-01 PROCEDURE — 63600175 PHARM REV CODE 636 W HCPCS: Performed by: INTERNAL MEDICINE

## 2021-01-01 PROCEDURE — 99232 PR SUBSEQUENT HOSPITAL CARE,LEVL II: ICD-10-PCS | Mod: ,,, | Performed by: INTERNAL MEDICINE

## 2021-01-01 PROCEDURE — 27000221 HC OXYGEN, UP TO 24 HOURS

## 2021-01-01 PROCEDURE — 99233 PR SUBSEQUENT HOSPITAL CARE,LEVL III: ICD-10-PCS | Mod: ,,, | Performed by: INTERNAL MEDICINE

## 2021-01-01 PROCEDURE — 63600175 PHARM REV CODE 636 W HCPCS: Performed by: STUDENT IN AN ORGANIZED HEALTH CARE EDUCATION/TRAINING PROGRAM

## 2021-01-01 PROCEDURE — 83735 ASSAY OF MAGNESIUM: CPT | Mod: 91 | Performed by: STUDENT IN AN ORGANIZED HEALTH CARE EDUCATION/TRAINING PROGRAM

## 2021-01-01 PROCEDURE — 80053 COMPREHEN METABOLIC PANEL: CPT | Performed by: INTERNAL MEDICINE

## 2021-01-01 PROCEDURE — 11000001 HC ACUTE MED/SURG PRIVATE ROOM

## 2021-01-01 PROCEDURE — 80053 COMPREHEN METABOLIC PANEL: CPT | Performed by: NURSE PRACTITIONER

## 2021-01-01 PROCEDURE — 99900035 HC TECH TIME PER 15 MIN (STAT)

## 2021-01-01 PROCEDURE — 85610 PROTHROMBIN TIME: CPT | Performed by: STUDENT IN AN ORGANIZED HEALTH CARE EDUCATION/TRAINING PROGRAM

## 2021-01-01 PROCEDURE — 82140 ASSAY OF AMMONIA: CPT | Performed by: EMERGENCY MEDICINE

## 2021-01-01 PROCEDURE — 80069 RENAL FUNCTION PANEL: CPT | Mod: 91 | Performed by: INTERNAL MEDICINE

## 2021-01-01 PROCEDURE — 95812 EEG 41-60 MINUTES: CPT

## 2021-01-01 PROCEDURE — 82140 ASSAY OF AMMONIA: CPT | Performed by: NURSE PRACTITIONER

## 2021-01-01 PROCEDURE — 85025 COMPLETE CBC W/AUTO DIFF WBC: CPT | Performed by: NURSE PRACTITIONER

## 2021-01-01 PROCEDURE — 82330 ASSAY OF CALCIUM: CPT | Performed by: STUDENT IN AN ORGANIZED HEALTH CARE EDUCATION/TRAINING PROGRAM

## 2021-01-01 PROCEDURE — C9113 INJ PANTOPRAZOLE SODIUM, VIA: HCPCS | Performed by: STUDENT IN AN ORGANIZED HEALTH CARE EDUCATION/TRAINING PROGRAM

## 2021-01-01 PROCEDURE — 84100 ASSAY OF PHOSPHORUS: CPT | Performed by: STUDENT IN AN ORGANIZED HEALTH CARE EDUCATION/TRAINING PROGRAM

## 2021-01-01 PROCEDURE — 85384 FIBRINOGEN ACTIVITY: CPT | Performed by: INTERNAL MEDICINE

## 2021-01-01 PROCEDURE — 94003 VENT MGMT INPAT SUBQ DAY: CPT

## 2021-01-01 PROCEDURE — 80100008 HC CRRT DAILY MAINTENANCE

## 2021-01-01 PROCEDURE — 82330 ASSAY OF CALCIUM: CPT | Mod: 91 | Performed by: INTERNAL MEDICINE

## 2021-01-01 PROCEDURE — 92526 ORAL FUNCTION THERAPY: CPT

## 2021-01-01 PROCEDURE — 99292 PR CRITICAL CARE, ADDL 30 MIN: ICD-10-PCS | Mod: 25,,, | Performed by: NURSE PRACTITIONER

## 2021-01-01 PROCEDURE — 84100 ASSAY OF PHOSPHORUS: CPT | Performed by: INTERNAL MEDICINE

## 2021-01-01 PROCEDURE — 85025 COMPLETE CBC W/AUTO DIFF WBC: CPT | Mod: 91 | Performed by: STUDENT IN AN ORGANIZED HEALTH CARE EDUCATION/TRAINING PROGRAM

## 2021-01-01 PROCEDURE — 83735 ASSAY OF MAGNESIUM: CPT | Performed by: STUDENT IN AN ORGANIZED HEALTH CARE EDUCATION/TRAINING PROGRAM

## 2021-01-01 PROCEDURE — 87205 SMEAR GRAM STAIN: CPT | Performed by: STUDENT IN AN ORGANIZED HEALTH CARE EDUCATION/TRAINING PROGRAM

## 2021-01-01 PROCEDURE — 99233 SBSQ HOSP IP/OBS HIGH 50: CPT | Mod: ,,, | Performed by: PSYCHIATRY & NEUROLOGY

## 2021-01-01 PROCEDURE — 25000242 PHARM REV CODE 250 ALT 637 W/ HCPCS: Performed by: STUDENT IN AN ORGANIZED HEALTH CARE EDUCATION/TRAINING PROGRAM

## 2021-01-01 PROCEDURE — 94002 VENT MGMT INPAT INIT DAY: CPT

## 2021-01-01 PROCEDURE — 87040 BLOOD CULTURE FOR BACTERIA: CPT | Mod: 59 | Performed by: STUDENT IN AN ORGANIZED HEALTH CARE EDUCATION/TRAINING PROGRAM

## 2021-01-01 PROCEDURE — 83735 ASSAY OF MAGNESIUM: CPT | Performed by: INTERNAL MEDICINE

## 2021-01-01 PROCEDURE — 87116 MYCOBACTERIA CULTURE: CPT | Mod: 59 | Performed by: INTERNAL MEDICINE

## 2021-01-01 PROCEDURE — 99223 PR INITIAL HOSPITAL CARE,LEVL III: ICD-10-PCS | Mod: ,,, | Performed by: INTERNAL MEDICINE

## 2021-01-01 PROCEDURE — 27200966 HC CLOSED SUCTION SYSTEM

## 2021-01-01 PROCEDURE — 36415 COLL VENOUS BLD VENIPUNCTURE: CPT | Performed by: STUDENT IN AN ORGANIZED HEALTH CARE EDUCATION/TRAINING PROGRAM

## 2021-01-01 PROCEDURE — 90935 PR HEMODIALYSIS, ONE EVALUATION: ICD-10-PCS | Mod: ,,, | Performed by: INTERNAL MEDICINE

## 2021-01-01 PROCEDURE — 25000003 PHARM REV CODE 250: Performed by: NURSE PRACTITIONER

## 2021-01-01 PROCEDURE — 80202 ASSAY OF VANCOMYCIN: CPT | Performed by: INTERNAL MEDICINE

## 2021-01-01 PROCEDURE — 99223 1ST HOSP IP/OBS HIGH 75: CPT | Mod: ,,, | Performed by: INTERNAL MEDICINE

## 2021-01-01 PROCEDURE — 25000242 PHARM REV CODE 250 ALT 637 W/ HCPCS: Performed by: INTERNAL MEDICINE

## 2021-01-01 PROCEDURE — 99291 PR CRITICAL CARE, E/M 30-74 MINUTES: ICD-10-PCS | Mod: ,,, | Performed by: INTERNAL MEDICINE

## 2021-01-01 PROCEDURE — 85025 COMPLETE CBC W/AUTO DIFF WBC: CPT | Performed by: STUDENT IN AN ORGANIZED HEALTH CARE EDUCATION/TRAINING PROGRAM

## 2021-01-01 PROCEDURE — 85610 PROTHROMBIN TIME: CPT | Performed by: EMERGENCY MEDICINE

## 2021-01-01 PROCEDURE — 87305 ASPERGILLUS AG IA: CPT | Performed by: INTERNAL MEDICINE

## 2021-01-01 PROCEDURE — 99291 CRITICAL CARE FIRST HOUR: CPT | Mod: CS,,, | Performed by: EMERGENCY MEDICINE

## 2021-01-01 PROCEDURE — 25500020 PHARM REV CODE 255: Performed by: EMERGENCY MEDICINE

## 2021-01-01 PROCEDURE — 87205 SMEAR GRAM STAIN: CPT | Mod: 59 | Performed by: INTERNAL MEDICINE

## 2021-01-01 PROCEDURE — 87798 DETECT AGENT NOS DNA AMP: CPT | Performed by: INTERNAL MEDICINE

## 2021-01-01 PROCEDURE — 93010 ELECTROCARDIOGRAM REPORT: CPT | Mod: ,,, | Performed by: INTERNAL MEDICINE

## 2021-01-01 PROCEDURE — 85025 COMPLETE CBC W/AUTO DIFF WBC: CPT | Performed by: INTERNAL MEDICINE

## 2021-01-01 PROCEDURE — 82306 VITAMIN D 25 HYDROXY: CPT | Performed by: INTERNAL MEDICINE

## 2021-01-01 PROCEDURE — 94761 N-INVAS EAR/PLS OXIMETRY MLT: CPT

## 2021-01-01 PROCEDURE — 86038 ANTINUCLEAR ANTIBODIES: CPT | Performed by: STUDENT IN AN ORGANIZED HEALTH CARE EDUCATION/TRAINING PROGRAM

## 2021-01-01 PROCEDURE — 82607 VITAMIN B-12: CPT | Performed by: INTERNAL MEDICINE

## 2021-01-01 PROCEDURE — 90935 HEMODIALYSIS ONE EVALUATION: CPT | Mod: ,,, | Performed by: INTERNAL MEDICINE

## 2021-01-01 PROCEDURE — 82746 ASSAY OF FOLIC ACID SERUM: CPT | Performed by: INTERNAL MEDICINE

## 2021-01-01 PROCEDURE — 93010 ELECTROCARDIOGRAM REPORT: CPT | Mod: 76,,, | Performed by: INTERNAL MEDICINE

## 2021-01-01 PROCEDURE — 99223 PR INITIAL HOSPITAL CARE,LEVL III: ICD-10-PCS | Mod: ,,, | Performed by: STUDENT IN AN ORGANIZED HEALTH CARE EDUCATION/TRAINING PROGRAM

## 2021-01-01 PROCEDURE — 25500020 PHARM REV CODE 255: Performed by: INTERNAL MEDICINE

## 2021-01-01 PROCEDURE — 86235 NUCLEAR ANTIGEN ANTIBODY: CPT | Mod: 91 | Performed by: STUDENT IN AN ORGANIZED HEALTH CARE EDUCATION/TRAINING PROGRAM

## 2021-01-01 PROCEDURE — 97535 SELF CARE MNGMENT TRAINING: CPT

## 2021-01-01 PROCEDURE — 83880 ASSAY OF NATRIURETIC PEPTIDE: CPT | Performed by: STUDENT IN AN ORGANIZED HEALTH CARE EDUCATION/TRAINING PROGRAM

## 2021-01-01 PROCEDURE — 83735 ASSAY OF MAGNESIUM: CPT | Mod: 91 | Performed by: INTERNAL MEDICINE

## 2021-01-01 PROCEDURE — 99233 SBSQ HOSP IP/OBS HIGH 50: CPT | Mod: ,,, | Performed by: STUDENT IN AN ORGANIZED HEALTH CARE EDUCATION/TRAINING PROGRAM

## 2021-01-01 PROCEDURE — 87206 SMEAR FLUORESCENT/ACID STAI: CPT | Mod: 91 | Performed by: INTERNAL MEDICINE

## 2021-01-01 PROCEDURE — 80202 ASSAY OF VANCOMYCIN: CPT | Performed by: STUDENT IN AN ORGANIZED HEALTH CARE EDUCATION/TRAINING PROGRAM

## 2021-01-01 PROCEDURE — 87324 CLOSTRIDIUM AG IA: CPT | Performed by: STUDENT IN AN ORGANIZED HEALTH CARE EDUCATION/TRAINING PROGRAM

## 2021-01-01 PROCEDURE — 63600175 PHARM REV CODE 636 W HCPCS: Mod: JG | Performed by: STUDENT IN AN ORGANIZED HEALTH CARE EDUCATION/TRAINING PROGRAM

## 2021-01-01 PROCEDURE — 37799 UNLISTED PX VASCULAR SURGERY: CPT

## 2021-01-01 PROCEDURE — 49082 ABD PARACENTESIS: CPT | Mod: ,,, | Performed by: NURSE PRACTITIONER

## 2021-01-01 PROCEDURE — 85027 COMPLETE CBC AUTOMATED: CPT | Performed by: INTERNAL MEDICINE

## 2021-01-01 PROCEDURE — 63700000 PHARM REV CODE 250 ALT 637 W/O HCPCS: Performed by: STUDENT IN AN ORGANIZED HEALTH CARE EDUCATION/TRAINING PROGRAM

## 2021-01-01 PROCEDURE — 97530 THERAPEUTIC ACTIVITIES: CPT

## 2021-01-01 PROCEDURE — 25000003 PHARM REV CODE 250: Performed by: EMERGENCY MEDICINE

## 2021-01-01 PROCEDURE — 94668 MNPJ CHEST WALL SBSQ: CPT

## 2021-01-01 PROCEDURE — 63600175 PHARM REV CODE 636 W HCPCS: Performed by: HOSPITALIST

## 2021-01-01 PROCEDURE — P9047 ALBUMIN (HUMAN), 25%, 50ML: HCPCS | Mod: JG | Performed by: STUDENT IN AN ORGANIZED HEALTH CARE EDUCATION/TRAINING PROGRAM

## 2021-01-01 PROCEDURE — 27100245 HC EEG CAPS, DISPOSABLE

## 2021-01-01 PROCEDURE — 84132 ASSAY OF SERUM POTASSIUM: CPT | Mod: 91 | Performed by: INTERNAL MEDICINE

## 2021-01-01 PROCEDURE — 80069 RENAL FUNCTION PANEL: CPT | Performed by: INTERNAL MEDICINE

## 2021-01-01 PROCEDURE — 85384 FIBRINOGEN ACTIVITY: CPT | Mod: 91 | Performed by: STUDENT IN AN ORGANIZED HEALTH CARE EDUCATION/TRAINING PROGRAM

## 2021-01-01 PROCEDURE — 99233 PR SUBSEQUENT HOSPITAL CARE,LEVL III: ICD-10-PCS | Mod: ,,, | Performed by: PSYCHIATRY & NEUROLOGY

## 2021-01-01 PROCEDURE — 86900 BLOOD TYPING SEROLOGIC ABO: CPT | Performed by: INTERNAL MEDICINE

## 2021-01-01 PROCEDURE — 87040 BLOOD CULTURE FOR BACTERIA: CPT | Mod: 59 | Performed by: EMERGENCY MEDICINE

## 2021-01-01 PROCEDURE — 95812 PR EEG,EXTENDED MONITORING,41-60 MINUTES: ICD-10-PCS | Mod: 26,,, | Performed by: PSYCHIATRY & NEUROLOGY

## 2021-01-01 PROCEDURE — P9021 RED BLOOD CELLS UNIT: HCPCS | Performed by: STUDENT IN AN ORGANIZED HEALTH CARE EDUCATION/TRAINING PROGRAM

## 2021-01-01 PROCEDURE — 25000003 PHARM REV CODE 250

## 2021-01-01 PROCEDURE — 99292 CRITICAL CARE ADDL 30 MIN: CPT | Mod: 25,,, | Performed by: NURSE PRACTITIONER

## 2021-01-01 PROCEDURE — 85060 BLOOD SMEAR INTERPRETATION: CPT | Mod: ,,, | Performed by: PATHOLOGY

## 2021-01-01 PROCEDURE — U0002 COVID-19 LAB TEST NON-CDC: HCPCS | Performed by: EMERGENCY MEDICINE

## 2021-01-01 PROCEDURE — 80074 ACUTE HEPATITIS PANEL: CPT | Performed by: NURSE PRACTITIONER

## 2021-01-01 PROCEDURE — 80053 COMPREHEN METABOLIC PANEL: CPT | Mod: 91 | Performed by: STUDENT IN AN ORGANIZED HEALTH CARE EDUCATION/TRAINING PROGRAM

## 2021-01-01 PROCEDURE — 94640 AIRWAY INHALATION TREATMENT: CPT

## 2021-01-01 PROCEDURE — 99233 SBSQ HOSP IP/OBS HIGH 50: CPT | Mod: ,,, | Performed by: HOSPITALIST

## 2021-01-01 PROCEDURE — 84300 ASSAY OF URINE SODIUM: CPT | Performed by: STUDENT IN AN ORGANIZED HEALTH CARE EDUCATION/TRAINING PROGRAM

## 2021-01-01 PROCEDURE — 97163 PT EVAL HIGH COMPLEX 45 MIN: CPT

## 2021-01-01 PROCEDURE — 80307 DRUG TEST PRSMV CHEM ANLYZR: CPT | Performed by: STUDENT IN AN ORGANIZED HEALTH CARE EDUCATION/TRAINING PROGRAM

## 2021-01-01 PROCEDURE — 80069 RENAL FUNCTION PANEL: CPT | Mod: 91 | Performed by: STUDENT IN AN ORGANIZED HEALTH CARE EDUCATION/TRAINING PROGRAM

## 2021-01-01 PROCEDURE — 87070 CULTURE OTHR SPECIMN AEROBIC: CPT | Performed by: NURSE PRACTITIONER

## 2021-01-01 PROCEDURE — 36556 INSERT NON-TUNNEL CV CATH: CPT | Mod: ,,, | Performed by: INTERNAL MEDICINE

## 2021-01-01 PROCEDURE — 85027 COMPLETE CBC AUTOMATED: CPT | Performed by: STUDENT IN AN ORGANIZED HEALTH CARE EDUCATION/TRAINING PROGRAM

## 2021-01-01 PROCEDURE — 83615 LACTATE (LD) (LDH) ENZYME: CPT | Performed by: NURSE PRACTITIONER

## 2021-01-01 PROCEDURE — 87086 URINE CULTURE/COLONY COUNT: CPT | Performed by: STUDENT IN AN ORGANIZED HEALTH CARE EDUCATION/TRAINING PROGRAM

## 2021-01-01 PROCEDURE — 87070 CULTURE OTHR SPECIMN AEROBIC: CPT | Performed by: STUDENT IN AN ORGANIZED HEALTH CARE EDUCATION/TRAINING PROGRAM

## 2021-01-01 PROCEDURE — 84156 ASSAY OF PROTEIN URINE: CPT | Performed by: STUDENT IN AN ORGANIZED HEALTH CARE EDUCATION/TRAINING PROGRAM

## 2021-01-01 PROCEDURE — 87075 CULTR BACTERIA EXCEPT BLOOD: CPT | Performed by: NURSE PRACTITIONER

## 2021-01-01 PROCEDURE — 82248 BILIRUBIN DIRECT: CPT

## 2021-01-01 PROCEDURE — 84157 ASSAY OF PROTEIN OTHER: CPT | Performed by: NURSE PRACTITIONER

## 2021-01-01 PROCEDURE — 80069 RENAL FUNCTION PANEL: CPT | Performed by: STUDENT IN AN ORGANIZED HEALTH CARE EDUCATION/TRAINING PROGRAM

## 2021-01-01 PROCEDURE — 99497 ADVNCD CARE PLAN 30 MIN: CPT | Mod: 25,,, | Performed by: EMERGENCY MEDICINE

## 2021-01-01 PROCEDURE — 82803 BLOOD GASES ANY COMBINATION: CPT

## 2021-01-01 PROCEDURE — 89051 BODY FLUID CELL COUNT: CPT | Performed by: NURSE PRACTITIONER

## 2021-01-01 PROCEDURE — 36556 PR INSERT NON-TUNNEL CV CATH 5+ YRS OLD: ICD-10-PCS | Mod: ,,, | Performed by: INTERNAL MEDICINE

## 2021-01-01 PROCEDURE — 86900 BLOOD TYPING SEROLOGIC ABO: CPT | Performed by: NURSE PRACTITIONER

## 2021-01-01 PROCEDURE — 97166 OT EVAL MOD COMPLEX 45 MIN: CPT

## 2021-01-01 PROCEDURE — 99291 CRITICAL CARE FIRST HOUR: CPT | Mod: 25

## 2021-01-01 PROCEDURE — 87040 BLOOD CULTURE FOR BACTERIA: CPT | Performed by: STUDENT IN AN ORGANIZED HEALTH CARE EDUCATION/TRAINING PROGRAM

## 2021-01-01 PROCEDURE — 85007 BL SMEAR W/DIFF WBC COUNT: CPT | Mod: 91 | Performed by: STUDENT IN AN ORGANIZED HEALTH CARE EDUCATION/TRAINING PROGRAM

## 2021-01-01 PROCEDURE — 85730 THROMBOPLASTIN TIME PARTIAL: CPT | Performed by: INTERNAL MEDICINE

## 2021-01-01 PROCEDURE — 99900025 HC BRONCHOSCOPY-ASST (STAT)

## 2021-01-01 PROCEDURE — 83935 ASSAY OF URINE OSMOLALITY: CPT | Performed by: STUDENT IN AN ORGANIZED HEALTH CARE EDUCATION/TRAINING PROGRAM

## 2021-01-01 PROCEDURE — 85045 AUTOMATED RETICULOCYTE COUNT: CPT

## 2021-01-01 PROCEDURE — 80047 BASIC METABLC PNL IONIZED CA: CPT

## 2021-01-01 PROCEDURE — 82525 ASSAY OF COPPER: CPT

## 2021-01-01 PROCEDURE — 63600175 PHARM REV CODE 636 W HCPCS: Performed by: EMERGENCY MEDICINE

## 2021-01-01 PROCEDURE — 94660 CPAP INITIATION&MGMT: CPT

## 2021-01-01 PROCEDURE — 82140 ASSAY OF AMMONIA: CPT | Performed by: INTERNAL MEDICINE

## 2021-01-01 PROCEDURE — 92610 EVALUATE SWALLOWING FUNCTION: CPT

## 2021-01-01 PROCEDURE — 85060 PATHOLOGIST REVIEW: ICD-10-PCS | Mod: ,,, | Performed by: PATHOLOGY

## 2021-01-01 PROCEDURE — 83605 ASSAY OF LACTIC ACID: CPT | Performed by: STUDENT IN AN ORGANIZED HEALTH CARE EDUCATION/TRAINING PROGRAM

## 2021-01-01 PROCEDURE — 84443 ASSAY THYROID STIM HORMONE: CPT | Performed by: INTERNAL MEDICINE

## 2021-01-01 PROCEDURE — 99233 PR SUBSEQUENT HOSPITAL CARE,LEVL III: ICD-10-PCS | Mod: ,,, | Performed by: HOSPITALIST

## 2021-01-01 PROCEDURE — 86703 HIV-1/HIV-2 1 RESULT ANTBDY: CPT | Performed by: INTERNAL MEDICINE

## 2021-01-01 PROCEDURE — 85379 FIBRIN DEGRADATION QUANT: CPT | Performed by: INTERNAL MEDICINE

## 2021-01-01 PROCEDURE — 81003 URINALYSIS AUTO W/O SCOPE: CPT | Performed by: NURSE PRACTITIONER

## 2021-01-01 PROCEDURE — 86256 FLUORESCENT ANTIBODY TITER: CPT | Performed by: STUDENT IN AN ORGANIZED HEALTH CARE EDUCATION/TRAINING PROGRAM

## 2021-01-01 PROCEDURE — 93010 EKG 12-LEAD: ICD-10-PCS | Mod: 76,,, | Performed by: INTERNAL MEDICINE

## 2021-01-01 PROCEDURE — 99255 PR INITIAL INPATIENT CONSULT,LEVL V: ICD-10-PCS | Mod: ,,, | Performed by: INTERNAL MEDICINE

## 2021-01-01 PROCEDURE — 80069 RENAL FUNCTION PANEL: CPT | Mod: 91

## 2021-01-01 PROCEDURE — 27202415 HC CARTRIDGE, CRRT

## 2021-01-01 PROCEDURE — 36620 INSERTION CATHETER ARTERY: CPT | Mod: 59,,, | Performed by: INTERNAL MEDICINE

## 2021-01-01 PROCEDURE — 96366 THER/PROPH/DIAG IV INF ADDON: CPT

## 2021-01-01 PROCEDURE — 99233 SBSQ HOSP IP/OBS HIGH 50: CPT | Mod: 25,,, | Performed by: INTERNAL MEDICINE

## 2021-01-01 PROCEDURE — 99291 PR CRITICAL CARE, E/M 30-74 MINUTES: ICD-10-PCS | Mod: 25,,, | Performed by: INTERNAL MEDICINE

## 2021-01-01 PROCEDURE — 87449 NOS EACH ORGANISM AG IA: CPT | Performed by: STUDENT IN AN ORGANIZED HEALTH CARE EDUCATION/TRAINING PROGRAM

## 2021-01-01 PROCEDURE — 84439 ASSAY OF FREE THYROXINE: CPT | Performed by: INTERNAL MEDICINE

## 2021-01-01 PROCEDURE — 99255 IP/OBS CONSLTJ NEW/EST HI 80: CPT | Mod: ,,, | Performed by: INTERNAL MEDICINE

## 2021-01-01 PROCEDURE — 84132 ASSAY OF SERUM POTASSIUM: CPT | Mod: 91 | Performed by: STUDENT IN AN ORGANIZED HEALTH CARE EDUCATION/TRAINING PROGRAM

## 2021-01-01 PROCEDURE — 86592 SYPHILIS TEST NON-TREP QUAL: CPT | Performed by: INTERNAL MEDICINE

## 2021-01-01 PROCEDURE — 36415 COLL VENOUS BLD VENIPUNCTURE: CPT | Performed by: INTERNAL MEDICINE

## 2021-01-01 PROCEDURE — P9017 PLASMA 1 DONOR FRZ W/IN 8 HR: HCPCS | Performed by: INTERNAL MEDICINE

## 2021-01-01 PROCEDURE — 86900 BLOOD TYPING SEROLOGIC ABO: CPT | Performed by: STUDENT IN AN ORGANIZED HEALTH CARE EDUCATION/TRAINING PROGRAM

## 2021-01-01 PROCEDURE — 90792 PR PSYCHIATRIC DIAGNOSTIC EVALUATION W/MEDICAL SERVICES: ICD-10-PCS | Mod: ,,, | Performed by: PSYCHIATRY & NEUROLOGY

## 2021-01-01 PROCEDURE — 99223 PR INITIAL HOSPITAL CARE,LEVL III: ICD-10-PCS | Mod: ,,, | Performed by: EMERGENCY MEDICINE

## 2021-01-01 PROCEDURE — 87799 DETECT AGENT NOS DNA QUANT: CPT | Performed by: INTERNAL MEDICINE

## 2021-01-01 PROCEDURE — 86920 COMPATIBILITY TEST SPIN: CPT | Performed by: STUDENT IN AN ORGANIZED HEALTH CARE EDUCATION/TRAINING PROGRAM

## 2021-01-01 PROCEDURE — 80048 BASIC METABOLIC PNL TOTAL CA: CPT | Performed by: STUDENT IN AN ORGANIZED HEALTH CARE EDUCATION/TRAINING PROGRAM

## 2021-01-01 PROCEDURE — 82390 ASSAY OF CERULOPLASMIN: CPT | Performed by: STUDENT IN AN ORGANIZED HEALTH CARE EDUCATION/TRAINING PROGRAM

## 2021-01-01 PROCEDURE — 93005 ELECTROCARDIOGRAM TRACING: CPT

## 2021-01-01 PROCEDURE — 82270 OCCULT BLOOD FECES: CPT | Performed by: INTERNAL MEDICINE

## 2021-01-01 PROCEDURE — 82140 ASSAY OF AMMONIA: CPT | Performed by: STUDENT IN AN ORGANIZED HEALTH CARE EDUCATION/TRAINING PROGRAM

## 2021-01-01 PROCEDURE — 99233 PR SUBSEQUENT HOSPITAL CARE,LEVL III: ICD-10-PCS | Mod: 25,,, | Performed by: INTERNAL MEDICINE

## 2021-01-01 PROCEDURE — 49083 ABD PARACENTESIS W/IMAGING: CPT | Performed by: NURSE PRACTITIONER

## 2021-01-01 PROCEDURE — 43752 NASAL/OROGASTRIC W/TUBE PLMT: CPT

## 2021-01-01 PROCEDURE — 99497 PR ADVNCD CARE PLAN 30 MIN: ICD-10-PCS | Mod: 25,,, | Performed by: EMERGENCY MEDICINE

## 2021-01-01 PROCEDURE — 84145 PROCALCITONIN (PCT): CPT | Performed by: INTERNAL MEDICINE

## 2021-01-01 PROCEDURE — 87632 RESP VIRUS 6-11 TARGETS: CPT | Performed by: INTERNAL MEDICINE

## 2021-01-01 PROCEDURE — 82077 ASSAY SPEC XCP UR&BREATH IA: CPT | Performed by: EMERGENCY MEDICINE

## 2021-01-01 PROCEDURE — 84540 ASSAY OF URINE/UREA-N: CPT | Performed by: STUDENT IN AN ORGANIZED HEALTH CARE EDUCATION/TRAINING PROGRAM

## 2021-01-01 PROCEDURE — 84100 ASSAY OF PHOSPHORUS: CPT | Mod: 91 | Performed by: INTERNAL MEDICINE

## 2021-01-01 PROCEDURE — 82042 OTHER SOURCE ALBUMIN QUAN EA: CPT | Performed by: NURSE PRACTITIONER

## 2021-01-01 PROCEDURE — 99233 PR SUBSEQUENT HOSPITAL CARE,LEVL III: ICD-10-PCS | Mod: ,,, | Performed by: STUDENT IN AN ORGANIZED HEALTH CARE EDUCATION/TRAINING PROGRAM

## 2021-01-01 PROCEDURE — 82330 ASSAY OF CALCIUM: CPT | Performed by: INTERNAL MEDICINE

## 2021-01-01 PROCEDURE — 82784 ASSAY IGA/IGD/IGG/IGM EACH: CPT | Performed by: STUDENT IN AN ORGANIZED HEALTH CARE EDUCATION/TRAINING PROGRAM

## 2021-01-01 PROCEDURE — 27000190 HC CPAP FULL FACE MASK W/VALVE

## 2021-01-01 PROCEDURE — 31624 DX BRONCHOSCOPE/LAVAGE: CPT

## 2021-01-01 PROCEDURE — 85027 COMPLETE CBC AUTOMATED: CPT | Mod: 91 | Performed by: INTERNAL MEDICINE

## 2021-01-01 PROCEDURE — 80048 BASIC METABOLIC PNL TOTAL CA: CPT | Mod: 91 | Performed by: STUDENT IN AN ORGANIZED HEALTH CARE EDUCATION/TRAINING PROGRAM

## 2021-01-01 PROCEDURE — 82525 ASSAY OF COPPER: CPT | Performed by: STUDENT IN AN ORGANIZED HEALTH CARE EDUCATION/TRAINING PROGRAM

## 2021-01-01 PROCEDURE — 83540 ASSAY OF IRON: CPT | Performed by: STUDENT IN AN ORGANIZED HEALTH CARE EDUCATION/TRAINING PROGRAM

## 2021-01-01 PROCEDURE — 99900017 HC EXTUBATION W/PARAMETERS (STAT)

## 2021-01-01 PROCEDURE — 87081 CULTURE SCREEN ONLY: CPT | Performed by: STUDENT IN AN ORGANIZED HEALTH CARE EDUCATION/TRAINING PROGRAM

## 2021-01-01 PROCEDURE — 90792 PSYCH DIAG EVAL W/MED SRVCS: CPT | Mod: ,,, | Performed by: PSYCHIATRY & NEUROLOGY

## 2021-01-01 PROCEDURE — 27200188 HC TRANSDUCER, ART ADULT/PEDS

## 2021-01-01 PROCEDURE — 85025 COMPLETE CBC W/AUTO DIFF WBC: CPT | Mod: 91 | Performed by: INTERNAL MEDICINE

## 2021-01-01 PROCEDURE — 36620 PR INSERT CATH,ART,PERCUT,SHORTTERM: ICD-10-PCS | Mod: ,,, | Performed by: NURSE PRACTITIONER

## 2021-01-01 PROCEDURE — 82330 ASSAY OF CALCIUM: CPT | Mod: 91 | Performed by: STUDENT IN AN ORGANIZED HEALTH CARE EDUCATION/TRAINING PROGRAM

## 2021-01-01 PROCEDURE — 99291 PR CRITICAL CARE, E/M 30-74 MINUTES: ICD-10-PCS | Mod: CS,,, | Performed by: EMERGENCY MEDICINE

## 2021-01-01 PROCEDURE — 84132 ASSAY OF SERUM POTASSIUM: CPT | Performed by: STUDENT IN AN ORGANIZED HEALTH CARE EDUCATION/TRAINING PROGRAM

## 2021-01-01 PROCEDURE — 80321 ALCOHOLS BIOMARKERS 1OR 2: CPT | Performed by: STUDENT IN AN ORGANIZED HEALTH CARE EDUCATION/TRAINING PROGRAM

## 2021-01-01 PROCEDURE — 36600 WITHDRAWAL OF ARTERIAL BLOOD: CPT

## 2021-01-01 PROCEDURE — 80048 BASIC METABOLIC PNL TOTAL CA: CPT | Performed by: INTERNAL MEDICINE

## 2021-01-01 PROCEDURE — 63600175 PHARM REV CODE 636 W HCPCS: Mod: JA | Performed by: STUDENT IN AN ORGANIZED HEALTH CARE EDUCATION/TRAINING PROGRAM

## 2021-01-01 PROCEDURE — 83010 ASSAY OF HAPTOGLOBIN QUANT: CPT

## 2021-01-01 PROCEDURE — 49082 PR ABD PARACENTESIS: ICD-10-PCS | Mod: ,,, | Performed by: NURSE PRACTITIONER

## 2021-01-01 PROCEDURE — 93010 EKG 12-LEAD: ICD-10-PCS | Mod: ,,, | Performed by: INTERNAL MEDICINE

## 2021-01-01 PROCEDURE — 87205 SMEAR GRAM STAIN: CPT | Performed by: NURSE PRACTITIONER

## 2021-01-01 PROCEDURE — 86480 TB TEST CELL IMMUN MEASURE: CPT | Performed by: INTERNAL MEDICINE

## 2021-01-01 PROCEDURE — 84425 ASSAY OF VITAMIN B-1: CPT | Performed by: INTERNAL MEDICINE

## 2021-01-01 PROCEDURE — S0166 INJ OLANZAPINE 2.5MG: HCPCS | Performed by: INTERNAL MEDICINE

## 2021-01-01 PROCEDURE — 99223 1ST HOSP IP/OBS HIGH 75: CPT | Mod: ,,, | Performed by: STUDENT IN AN ORGANIZED HEALTH CARE EDUCATION/TRAINING PROGRAM

## 2021-01-01 PROCEDURE — 83615 LACTATE (LD) (LDH) ENZYME: CPT

## 2021-01-01 PROCEDURE — 83690 ASSAY OF LIPASE: CPT | Performed by: NURSE PRACTITIONER

## 2021-01-01 PROCEDURE — 87070 CULTURE OTHR SPECIMN AEROBIC: CPT | Performed by: INTERNAL MEDICINE

## 2021-01-01 PROCEDURE — 83605 ASSAY OF LACTIC ACID: CPT | Mod: 91 | Performed by: STUDENT IN AN ORGANIZED HEALTH CARE EDUCATION/TRAINING PROGRAM

## 2021-01-01 PROCEDURE — 99223 PR INITIAL HOSPITAL CARE,LEVL III: ICD-10-PCS | Mod: ,,, | Performed by: HOSPITALIST

## 2021-01-01 PROCEDURE — 80069 RENAL FUNCTION PANEL: CPT

## 2021-01-01 PROCEDURE — 86880 COOMBS TEST DIRECT: CPT

## 2021-01-01 PROCEDURE — 96365 THER/PROPH/DIAG IV INF INIT: CPT

## 2021-01-01 PROCEDURE — 87206 SMEAR FLUORESCENT/ACID STAI: CPT | Performed by: INTERNAL MEDICINE

## 2021-01-01 PROCEDURE — 83605 ASSAY OF LACTIC ACID: CPT | Performed by: EMERGENCY MEDICINE

## 2021-01-01 PROCEDURE — 99223 1ST HOSP IP/OBS HIGH 75: CPT | Mod: ,,, | Performed by: HOSPITALIST

## 2021-01-01 PROCEDURE — 87102 FUNGUS ISOLATION CULTURE: CPT | Performed by: INTERNAL MEDICINE

## 2021-01-01 PROCEDURE — 87015 SPECIMEN INFECT AGNT CONCNTJ: CPT | Performed by: INTERNAL MEDICINE

## 2021-01-01 PROCEDURE — 99223 1ST HOSP IP/OBS HIGH 75: CPT | Mod: ,,, | Performed by: EMERGENCY MEDICINE

## 2021-01-01 PROCEDURE — 86703 HIV-1/HIV-2 1 RESULT ANTBDY: CPT | Performed by: EMERGENCY MEDICINE

## 2021-01-01 PROCEDURE — 36620 INSERTION CATHETER ARTERY: CPT | Mod: ,,, | Performed by: STUDENT IN AN ORGANIZED HEALTH CARE EDUCATION/TRAINING PROGRAM

## 2021-01-01 PROCEDURE — 95812 EEG 41-60 MINUTES: CPT | Mod: 26,,, | Performed by: PSYCHIATRY & NEUROLOGY

## 2021-01-01 PROCEDURE — 99222 1ST HOSP IP/OBS MODERATE 55: CPT | Mod: ,,, | Performed by: INTERNAL MEDICINE

## 2021-01-01 PROCEDURE — 83605 ASSAY OF LACTIC ACID: CPT | Performed by: INTERNAL MEDICINE

## 2021-01-01 PROCEDURE — 31622 DX BRONCHOSCOPE/WASH: CPT

## 2021-01-01 PROCEDURE — 82728 ASSAY OF FERRITIN: CPT | Performed by: STUDENT IN AN ORGANIZED HEALTH CARE EDUCATION/TRAINING PROGRAM

## 2021-01-01 PROCEDURE — C9399 UNCLASSIFIED DRUGS OR BIOLOG: HCPCS | Performed by: STUDENT IN AN ORGANIZED HEALTH CARE EDUCATION/TRAINING PROGRAM

## 2021-01-01 PROCEDURE — 99291 CRITICAL CARE FIRST HOUR: CPT | Mod: 25,,, | Performed by: INTERNAL MEDICINE

## 2021-01-01 PROCEDURE — 81001 URINALYSIS AUTO W/SCOPE: CPT | Performed by: STUDENT IN AN ORGANIZED HEALTH CARE EDUCATION/TRAINING PROGRAM

## 2021-01-01 PROCEDURE — 99222 PR INITIAL HOSPITAL CARE,LEVL II: ICD-10-PCS | Mod: ,,, | Performed by: INTERNAL MEDICINE

## 2021-01-01 PROCEDURE — 87106 FUNGI IDENTIFICATION YEAST: CPT | Performed by: STUDENT IN AN ORGANIZED HEALTH CARE EDUCATION/TRAINING PROGRAM

## 2021-01-01 PROCEDURE — 96375 TX/PRO/DX INJ NEW DRUG ADDON: CPT

## 2021-01-01 PROCEDURE — 82103 ALPHA-1-ANTITRYPSIN TOTAL: CPT | Performed by: STUDENT IN AN ORGANIZED HEALTH CARE EDUCATION/TRAINING PROGRAM

## 2021-01-01 PROCEDURE — 36620 PR INSERT CATH,ART,PERCUT,SHORTTERM: ICD-10-PCS | Mod: 59,,, | Performed by: INTERNAL MEDICINE

## 2021-01-01 RX ORDER — LORAZEPAM 1 MG/1
2 TABLET ORAL EVERY 6 HOURS
Status: DISCONTINUED | OUTPATIENT
Start: 2021-01-01 | End: 2021-01-01

## 2021-01-01 RX ORDER — ETOMIDATE 2 MG/ML
INJECTION INTRAVENOUS
Status: DISCONTINUED
Start: 2021-01-01 | End: 2021-01-01 | Stop reason: WASHOUT

## 2021-01-01 RX ORDER — POTASSIUM CHLORIDE 7.45 MG/ML
80 INJECTION INTRAVENOUS
Status: DISCONTINUED | OUTPATIENT
Start: 2021-01-01 | End: 2021-01-01

## 2021-01-01 RX ORDER — LORAZEPAM 2 MG/ML
1 INJECTION INTRAMUSCULAR ONCE
Status: COMPLETED | OUTPATIENT
Start: 2021-01-01 | End: 2021-01-01

## 2021-01-01 RX ORDER — LORAZEPAM 2 MG/ML
2 INJECTION INTRAMUSCULAR EVERY 6 HOURS
Status: DISCONTINUED | OUTPATIENT
Start: 2021-01-01 | End: 2021-01-01

## 2021-01-01 RX ORDER — ROCURONIUM BROMIDE 10 MG/ML
INJECTION, SOLUTION INTRAVENOUS
Status: COMPLETED
Start: 2021-01-01 | End: 2021-01-01

## 2021-01-01 RX ORDER — LORAZEPAM 2 MG/ML
2 INJECTION INTRAMUSCULAR EVERY 4 HOURS PRN
Status: DISCONTINUED | OUTPATIENT
Start: 2021-01-01 | End: 2021-01-01

## 2021-01-01 RX ORDER — MAGNESIUM SULFATE HEPTAHYDRATE 40 MG/ML
4 INJECTION, SOLUTION INTRAVENOUS
Status: DISCONTINUED | OUTPATIENT
Start: 2021-01-01 | End: 2021-01-01

## 2021-01-01 RX ORDER — ALBUTEROL SULFATE 2.5 MG/.5ML
10 SOLUTION RESPIRATORY (INHALATION) ONCE
Status: COMPLETED | OUTPATIENT
Start: 2021-01-01 | End: 2021-01-01

## 2021-01-01 RX ORDER — EPINEPHRINE 1 MG/ML
INJECTION, SOLUTION INTRACARDIAC; INTRAMUSCULAR; INTRAVENOUS; SUBCUTANEOUS
Status: DISPENSED
Start: 2021-01-01 | End: 2021-01-01

## 2021-01-01 RX ORDER — GLUCAGON 1 MG
1 KIT INJECTION
Status: DISCONTINUED | OUTPATIENT
Start: 2021-01-01 | End: 2021-01-01

## 2021-01-01 RX ORDER — DEXTROSE MONOHYDRATE 100 MG/ML
INJECTION, SOLUTION INTRAVENOUS CONTINUOUS PRN
Status: DISCONTINUED | OUTPATIENT
Start: 2021-01-01 | End: 2021-01-01

## 2021-01-01 RX ORDER — MEROPENEM AND SODIUM CHLORIDE 1 G/50ML
1 INJECTION, SOLUTION INTRAVENOUS
Status: DISCONTINUED | OUTPATIENT
Start: 2021-01-01 | End: 2021-01-01

## 2021-01-01 RX ORDER — DEXMEDETOMIDINE HYDROCHLORIDE 4 UG/ML
INJECTION, SOLUTION INTRAVENOUS
Status: COMPLETED
Start: 2021-01-01 | End: 2021-01-01

## 2021-01-01 RX ORDER — LORAZEPAM 2 MG/ML
2 INJECTION INTRAMUSCULAR ONCE
Status: COMPLETED | OUTPATIENT
Start: 2021-01-01 | End: 2021-01-01

## 2021-01-01 RX ORDER — MEROPENEM 500 MG/1
500 INJECTION, POWDER, FOR SOLUTION INTRAVENOUS
Status: DISCONTINUED | OUTPATIENT
Start: 2021-01-01 | End: 2021-01-01

## 2021-01-01 RX ORDER — NOREPINEPHRINE BITARTRATE/D5W 4MG/250ML
0-3 PLASTIC BAG, INJECTION (ML) INTRAVENOUS CONTINUOUS
Status: DISCONTINUED | OUTPATIENT
Start: 2021-01-01 | End: 2021-01-01

## 2021-01-01 RX ORDER — HYDROCODONE BITARTRATE AND ACETAMINOPHEN 500; 5 MG/1; MG/1
TABLET ORAL CONTINUOUS
Status: DISCONTINUED | OUTPATIENT
Start: 2021-01-01 | End: 2021-01-01

## 2021-01-01 RX ORDER — VASOPRESSIN 20 [USP'U]/ML
INJECTION, SOLUTION INTRAMUSCULAR; SUBCUTANEOUS
Status: DISPENSED
Start: 2021-01-01 | End: 2021-01-01

## 2021-01-01 RX ORDER — ONDANSETRON 8 MG/1
8 TABLET, ORALLY DISINTEGRATING ORAL EVERY 8 HOURS PRN
Status: DISCONTINUED | OUTPATIENT
Start: 2021-01-01 | End: 2021-06-26 | Stop reason: HOSPADM

## 2021-01-01 RX ORDER — POTASSIUM CHLORIDE 7.45 MG/ML
40 INJECTION INTRAVENOUS
Status: DISCONTINUED | OUTPATIENT
Start: 2021-01-01 | End: 2021-01-01

## 2021-01-01 RX ORDER — FUROSEMIDE 10 MG/ML
80 INJECTION INTRAMUSCULAR; INTRAVENOUS ONCE
Status: COMPLETED | OUTPATIENT
Start: 2021-01-01 | End: 2021-01-01

## 2021-01-01 RX ORDER — LACTULOSE 10 G/15ML
20 SOLUTION ORAL 3 TIMES DAILY
Status: DISCONTINUED | OUTPATIENT
Start: 2021-01-01 | End: 2021-01-01

## 2021-01-01 RX ORDER — DIAZEPAM 5 MG/1
5 TABLET ORAL
Status: COMPLETED | OUTPATIENT
Start: 2021-01-01 | End: 2021-01-01

## 2021-01-01 RX ORDER — ETOMIDATE 2 MG/ML
INJECTION INTRAVENOUS
Status: COMPLETED
Start: 2021-01-01 | End: 2021-01-01

## 2021-01-01 RX ORDER — LORAZEPAM 2 MG/ML
1 INJECTION INTRAMUSCULAR EVERY 6 HOURS
Status: DISCONTINUED | OUTPATIENT
Start: 2021-01-01 | End: 2021-01-01

## 2021-01-01 RX ORDER — LORAZEPAM 1 MG/1
2 TABLET ORAL EVERY 4 HOURS
Status: DISCONTINUED | OUTPATIENT
Start: 2021-01-01 | End: 2021-01-01

## 2021-01-01 RX ORDER — MUPIROCIN 20 MG/G
OINTMENT TOPICAL 2 TIMES DAILY
Status: COMPLETED | OUTPATIENT
Start: 2021-01-01 | End: 2021-01-01

## 2021-01-01 RX ORDER — PROPOFOL 10 MG/ML
0-50 INJECTION, EMULSION INTRAVENOUS CONTINUOUS
Status: DISCONTINUED | OUTPATIENT
Start: 2021-01-01 | End: 2021-01-01

## 2021-01-01 RX ORDER — LORAZEPAM 2 MG/ML
2 INJECTION INTRAMUSCULAR
Status: DISCONTINUED | OUTPATIENT
Start: 2021-01-01 | End: 2021-01-01

## 2021-01-01 RX ORDER — SODIUM CHLORIDE 0.9 % (FLUSH) 0.9 %
10 SYRINGE (ML) INJECTION
Status: DISCONTINUED | OUTPATIENT
Start: 2021-01-01 | End: 2021-01-01

## 2021-01-01 RX ORDER — MAGNESIUM SULFATE HEPTAHYDRATE 40 MG/ML
2 INJECTION, SOLUTION INTRAVENOUS
Status: DISCONTINUED | OUTPATIENT
Start: 2021-01-01 | End: 2021-01-01

## 2021-01-01 RX ORDER — PROPOFOL 10 MG/ML
VIAL (ML) INTRAVENOUS
Status: DISCONTINUED
Start: 2021-01-01 | End: 2021-01-01 | Stop reason: WASHOUT

## 2021-01-01 RX ORDER — PANTOPRAZOLE SODIUM 40 MG/10ML
40 INJECTION, POWDER, LYOPHILIZED, FOR SOLUTION INTRAVENOUS 2 TIMES DAILY
Status: DISCONTINUED | OUTPATIENT
Start: 2021-01-01 | End: 2021-01-01

## 2021-01-01 RX ORDER — MIDAZOLAM HYDROCHLORIDE 1 MG/ML
1 INJECTION INTRAMUSCULAR; INTRAVENOUS ONCE
Status: COMPLETED | OUTPATIENT
Start: 2021-01-01 | End: 2021-01-01

## 2021-01-01 RX ORDER — PANTOPRAZOLE SODIUM 40 MG/10ML
40 INJECTION, POWDER, LYOPHILIZED, FOR SOLUTION INTRAVENOUS DAILY
Status: DISCONTINUED | OUTPATIENT
Start: 2021-01-01 | End: 2021-01-01

## 2021-01-01 RX ORDER — MIDODRINE HYDROCHLORIDE 5 MG/1
10 TABLET ORAL 3 TIMES DAILY
Status: DISCONTINUED | OUTPATIENT
Start: 2021-01-01 | End: 2021-01-01

## 2021-01-01 RX ORDER — DEXMEDETOMIDINE HYDROCHLORIDE 4 UG/ML
0-1.4 INJECTION, SOLUTION INTRAVENOUS CONTINUOUS
Status: DISCONTINUED | OUTPATIENT
Start: 2021-01-01 | End: 2021-01-01

## 2021-01-01 RX ORDER — SODIUM CHLORIDE 0.9 % (FLUSH) 0.9 %
10 SYRINGE (ML) INJECTION
Status: DISCONTINUED | OUTPATIENT
Start: 2021-01-01 | End: 2021-06-26 | Stop reason: HOSPADM

## 2021-01-01 RX ORDER — THIAMINE HCL 100 MG
100 TABLET ORAL DAILY
Status: DISCONTINUED | OUTPATIENT
Start: 2021-01-01 | End: 2021-01-01

## 2021-01-01 RX ORDER — FUROSEMIDE 10 MG/ML
80 INJECTION INTRAMUSCULAR; INTRAVENOUS
Status: COMPLETED | OUTPATIENT
Start: 2021-01-01 | End: 2021-01-01

## 2021-01-01 RX ORDER — LACTULOSE 10 G/15ML
30 SOLUTION ORAL EVERY 6 HOURS
Status: DISCONTINUED | OUTPATIENT
Start: 2021-01-01 | End: 2021-01-01

## 2021-01-01 RX ORDER — DEXTROSE MONOHYDRATE AND SODIUM CHLORIDE 5; .9 G/100ML; G/100ML
INJECTION, SOLUTION INTRAVENOUS CONTINUOUS
Status: ACTIVE | OUTPATIENT
Start: 2021-01-01 | End: 2021-01-01

## 2021-01-01 RX ORDER — POTASSIUM CHLORIDE 7.45 MG/ML
10 INJECTION INTRAVENOUS
Status: DISCONTINUED | OUTPATIENT
Start: 2021-01-01 | End: 2021-01-01

## 2021-01-01 RX ORDER — HYDROCODONE BITARTRATE AND ACETAMINOPHEN 500; 5 MG/1; MG/1
TABLET ORAL
Status: DISCONTINUED | OUTPATIENT
Start: 2021-01-01 | End: 2021-01-01

## 2021-01-01 RX ORDER — PROPOFOL 10 MG/ML
INJECTION, EMULSION INTRAVENOUS
Status: COMPLETED
Start: 2021-01-01 | End: 2021-01-01

## 2021-01-01 RX ORDER — FUROSEMIDE 10 MG/ML
40 INJECTION INTRAMUSCULAR; INTRAVENOUS
Status: DISCONTINUED | OUTPATIENT
Start: 2021-01-01 | End: 2021-01-01

## 2021-01-01 RX ORDER — PANTOPRAZOLE SODIUM 40 MG/10ML
80 INJECTION, POWDER, LYOPHILIZED, FOR SOLUTION INTRAVENOUS ONCE
Status: COMPLETED | OUTPATIENT
Start: 2021-01-01 | End: 2021-01-01

## 2021-01-01 RX ORDER — MAGNESIUM SULFATE HEPTAHYDRATE 40 MG/ML
2 INJECTION, SOLUTION INTRAVENOUS
Status: DISCONTINUED | OUTPATIENT
Start: 2021-01-01 | End: 2021-01-01 | Stop reason: SDUPTHER

## 2021-01-01 RX ORDER — LORAZEPAM 2 MG/ML
2 INJECTION INTRAMUSCULAR EVERY 10 MIN PRN
Status: DISCONTINUED | OUTPATIENT
Start: 2021-01-01 | End: 2021-06-26 | Stop reason: HOSPADM

## 2021-01-01 RX ORDER — POTASSIUM CHLORIDE 29.8 MG/ML
40 INJECTION INTRAVENOUS ONCE
Status: COMPLETED | OUTPATIENT
Start: 2021-01-01 | End: 2021-01-01

## 2021-01-01 RX ORDER — LORAZEPAM 1 MG/1
2 TABLET ORAL EVERY 4 HOURS PRN
Status: DISCONTINUED | OUTPATIENT
Start: 2021-01-01 | End: 2021-01-01

## 2021-01-01 RX ORDER — MAGNESIUM SULFATE HEPTAHYDRATE 40 MG/ML
2 INJECTION, SOLUTION INTRAVENOUS
Status: ACTIVE | OUTPATIENT
Start: 2021-01-01 | End: 2021-01-01

## 2021-01-01 RX ORDER — INSULIN ASPART 100 [IU]/ML
0-5 INJECTION, SOLUTION INTRAVENOUS; SUBCUTANEOUS EVERY 4 HOURS PRN
Status: DISCONTINUED | OUTPATIENT
Start: 2021-01-01 | End: 2021-01-01

## 2021-01-01 RX ORDER — OLANZAPINE 10 MG/2ML
5 INJECTION, POWDER, FOR SOLUTION INTRAMUSCULAR EVERY 8 HOURS PRN
Status: DISCONTINUED | OUTPATIENT
Start: 2021-01-01 | End: 2021-01-01

## 2021-01-01 RX ORDER — PHENYLEPHRINE HCL IN 0.9% NACL 1 MG/10 ML
SYRINGE (ML) INTRAVENOUS
Status: COMPLETED
Start: 2021-01-01 | End: 2021-01-01

## 2021-01-01 RX ORDER — GUAIFENESIN 600 MG/1
600 TABLET, EXTENDED RELEASE ORAL 2 TIMES DAILY
Status: DISCONTINUED | OUTPATIENT
Start: 2021-01-01 | End: 2021-01-01

## 2021-01-01 RX ORDER — MAGNESIUM SULFATE HEPTAHYDRATE 40 MG/ML
2 INJECTION, SOLUTION INTRAVENOUS ONCE
Status: COMPLETED | OUTPATIENT
Start: 2021-01-01 | End: 2021-01-01

## 2021-01-01 RX ORDER — OXYCODONE HYDROCHLORIDE 5 MG/1
5 TABLET ORAL ONCE
Status: COMPLETED | OUTPATIENT
Start: 2021-01-01 | End: 2021-01-01

## 2021-01-01 RX ORDER — SODIUM CHLORIDE 0.9 % (FLUSH) 0.9 %
3 SYRINGE (ML) INJECTION EVERY 30 MIN PRN
Status: CANCELLED | OUTPATIENT
Start: 2021-01-01

## 2021-01-01 RX ORDER — FAMOTIDINE 10 MG/ML
20 INJECTION INTRAVENOUS 2 TIMES DAILY
Status: CANCELLED | OUTPATIENT
Start: 2021-01-01

## 2021-01-01 RX ORDER — CHLORHEXIDINE GLUCONATE ORAL RINSE 1.2 MG/ML
15 SOLUTION DENTAL 2 TIMES DAILY
Status: DISCONTINUED | OUTPATIENT
Start: 2021-01-01 | End: 2021-01-01

## 2021-01-01 RX ORDER — IPRATROPIUM BROMIDE AND ALBUTEROL SULFATE 2.5; .5 MG/3ML; MG/3ML
3 SOLUTION RESPIRATORY (INHALATION) EVERY 4 HOURS PRN
Status: DISCONTINUED | OUTPATIENT
Start: 2021-01-01 | End: 2021-01-01

## 2021-01-01 RX ORDER — HALOPERIDOL 5 MG/ML
5 INJECTION INTRAMUSCULAR EVERY 6 HOURS PRN
Status: DISCONTINUED | OUTPATIENT
Start: 2021-01-01 | End: 2021-01-01

## 2021-01-01 RX ORDER — ALBUMIN HUMAN 250 G/1000ML
50 SOLUTION INTRAVENOUS ONCE
Status: COMPLETED | OUTPATIENT
Start: 2021-01-01 | End: 2021-01-01

## 2021-01-01 RX ORDER — SODIUM CHLORIDE 9 MG/ML
INJECTION, SOLUTION INTRAVENOUS CONTINUOUS
Status: DISCONTINUED | OUTPATIENT
Start: 2021-01-01 | End: 2021-01-01

## 2021-01-01 RX ORDER — LACTULOSE 10 G/15ML
20 SOLUTION ORAL EVERY 6 HOURS
Status: DISCONTINUED | OUTPATIENT
Start: 2021-01-01 | End: 2021-01-01

## 2021-01-01 RX ORDER — GLUCAGON 1 MG
1 KIT INJECTION
Status: DISCONTINUED | OUTPATIENT
Start: 2021-01-01 | End: 2021-06-26 | Stop reason: HOSPADM

## 2021-01-01 RX ORDER — DEXMEDETOMIDINE HYDROCHLORIDE 4 UG/ML
0-1.4 INJECTION, SOLUTION INTRAVENOUS CONTINUOUS
Status: DISCONTINUED | OUTPATIENT
Start: 2021-01-01 | End: 2021-06-26 | Stop reason: HOSPADM

## 2021-01-01 RX ORDER — NOREPINEPHRINE BITARTRATE/D5W 4MG/250ML
0-.2 PLASTIC BAG, INJECTION (ML) INTRAVENOUS CONTINUOUS
Status: DISCONTINUED | OUTPATIENT
Start: 2021-01-01 | End: 2021-01-01

## 2021-01-01 RX ORDER — SUCCINYLCHOLINE CHLORIDE 20 MG/ML
INJECTION INTRAMUSCULAR; INTRAVENOUS
Status: COMPLETED
Start: 2021-01-01 | End: 2021-01-01

## 2021-01-01 RX ORDER — AZITHROMYCIN 250 MG/1
500 TABLET, FILM COATED ORAL DAILY
Status: COMPLETED | OUTPATIENT
Start: 2021-01-01 | End: 2021-01-01

## 2021-01-01 RX ORDER — ETOMIDATE 2 MG/ML
6 INJECTION INTRAVENOUS ONCE
Status: COMPLETED | OUTPATIENT
Start: 2021-01-01 | End: 2021-01-01

## 2021-01-01 RX ORDER — LACTULOSE 10 G/15ML
30 SOLUTION ORAL 2 TIMES DAILY
Status: DISCONTINUED | OUTPATIENT
Start: 2021-01-01 | End: 2021-01-01

## 2021-01-01 RX ORDER — POTASSIUM CHLORIDE 7.45 MG/ML
10 INJECTION INTRAVENOUS
Status: COMPLETED | OUTPATIENT
Start: 2021-01-01 | End: 2021-01-01

## 2021-01-01 RX ORDER — SUCCINYLCHOLINE CHLORIDE 20 MG/ML
INJECTION INTRAMUSCULAR; INTRAVENOUS
Status: DISCONTINUED
Start: 2021-01-01 | End: 2021-01-01 | Stop reason: WASHOUT

## 2021-01-01 RX ORDER — IPRATROPIUM BROMIDE AND ALBUTEROL SULFATE 2.5; .5 MG/3ML; MG/3ML
3 SOLUTION RESPIRATORY (INHALATION) EVERY 6 HOURS
Status: DISCONTINUED | OUTPATIENT
Start: 2021-01-01 | End: 2021-01-01

## 2021-01-01 RX ORDER — SODIUM CHLORIDE 9 MG/ML
INJECTION, SOLUTION INTRAVENOUS CONTINUOUS
Status: ACTIVE | OUTPATIENT
Start: 2021-01-01 | End: 2021-01-01

## 2021-01-01 RX ORDER — CEFTRIAXONE 1 G/1
1 INJECTION, POWDER, FOR SOLUTION INTRAMUSCULAR; INTRAVENOUS
Status: COMPLETED | OUTPATIENT
Start: 2021-01-01 | End: 2021-01-01

## 2021-01-01 RX ORDER — NOREPINEPHRINE BITARTRATE/D5W 8 MG/250ML
0-3 PLASTIC BAG, INJECTION (ML) INTRAVENOUS CONTINUOUS
Status: DISCONTINUED | OUTPATIENT
Start: 2021-01-01 | End: 2021-01-01

## 2021-01-01 RX ORDER — VANCOMYCIN HCL IN 5 % DEXTROSE 1G/250ML
1000 PLASTIC BAG, INJECTION (ML) INTRAVENOUS
Status: COMPLETED | OUTPATIENT
Start: 2021-01-01 | End: 2021-01-01

## 2021-01-01 RX ORDER — NOREPINEPHRINE BITARTRATE/D5W 4MG/250ML
PLASTIC BAG, INJECTION (ML) INTRAVENOUS
Status: COMPLETED
Start: 2021-01-01 | End: 2021-01-01

## 2021-01-01 RX ORDER — ADENOSINE 3 MG/ML
INJECTION, SOLUTION INTRAVENOUS
Status: COMPLETED
Start: 2021-01-01 | End: 2021-01-01

## 2021-01-01 RX ORDER — METOLAZONE 2.5 MG/1
5 TABLET ORAL ONCE
Status: COMPLETED | OUTPATIENT
Start: 2021-01-01 | End: 2021-01-01

## 2021-01-01 RX ORDER — POTASSIUM CHLORIDE 7.45 MG/ML
60 INJECTION INTRAVENOUS
Status: DISCONTINUED | OUTPATIENT
Start: 2021-01-01 | End: 2021-01-01

## 2021-01-01 RX ORDER — EPINEPHRINE 0.1 MG/ML
INJECTION INTRAVENOUS
Status: DISCONTINUED
Start: 2021-01-01 | End: 2021-01-01 | Stop reason: WASHOUT

## 2021-01-01 RX ORDER — OLANZAPINE 10 MG/2ML
2.5 INJECTION, POWDER, FOR SOLUTION INTRAMUSCULAR EVERY 6 HOURS PRN
Status: DISCONTINUED | OUTPATIENT
Start: 2021-01-01 | End: 2021-01-01

## 2021-01-01 RX ORDER — ONDANSETRON 2 MG/ML
4 INJECTION INTRAMUSCULAR; INTRAVENOUS DAILY PRN
Status: CANCELLED | OUTPATIENT
Start: 2021-01-01

## 2021-01-01 RX ORDER — ALBUMIN HUMAN 250 G/1000ML
25 SOLUTION INTRAVENOUS ONCE
Status: COMPLETED | OUTPATIENT
Start: 2021-01-01 | End: 2021-01-01

## 2021-01-01 RX ORDER — ROCURONIUM BROMIDE 10 MG/ML
2 INJECTION, SOLUTION INTRAVENOUS ONCE
Status: COMPLETED | OUTPATIENT
Start: 2021-01-01 | End: 2021-01-01

## 2021-01-01 RX ORDER — FOLIC ACID 1 MG/1
1 TABLET ORAL DAILY
Status: DISCONTINUED | OUTPATIENT
Start: 2021-01-01 | End: 2021-01-01

## 2021-01-01 RX ORDER — NOREPINEPHRINE BITARTRATE 1 MG/ML
INJECTION, SOLUTION INTRAVENOUS
Status: DISPENSED
Start: 2021-01-01 | End: 2021-01-01

## 2021-01-01 RX ORDER — LACTULOSE 10 G/15ML
15 SOLUTION ORAL 3 TIMES DAILY
Status: DISCONTINUED | OUTPATIENT
Start: 2021-01-01 | End: 2021-01-01

## 2021-01-01 RX ORDER — VASOPRESSIN 20 [USP'U]/ML
INJECTION, SOLUTION INTRAMUSCULAR; SUBCUTANEOUS
Status: COMPLETED
Start: 2021-01-01 | End: 2021-01-01

## 2021-01-01 RX ORDER — MIDAZOLAM HYDROCHLORIDE 1 MG/ML
INJECTION INTRAMUSCULAR; INTRAVENOUS
Status: DISPENSED
Start: 2021-01-01 | End: 2021-01-01

## 2021-01-01 RX ORDER — MIDODRINE HYDROCHLORIDE 5 MG/1
15 TABLET ORAL 3 TIMES DAILY
Status: DISCONTINUED | OUTPATIENT
Start: 2021-01-01 | End: 2021-01-01

## 2021-01-01 RX ORDER — LORAZEPAM 2 MG/ML
1 INJECTION INTRAMUSCULAR EVERY 4 HOURS PRN
Status: DISCONTINUED | OUTPATIENT
Start: 2021-01-01 | End: 2021-01-01

## 2021-01-01 RX ORDER — EPINEPHRINE 1 MG/ML
INJECTION, SOLUTION INTRACARDIAC; INTRAMUSCULAR; INTRAVENOUS; SUBCUTANEOUS
Status: COMPLETED
Start: 2021-01-01 | End: 2021-01-01

## 2021-01-01 RX ORDER — LIDOCAINE HYDROCHLORIDE 10 MG/ML
INJECTION INFILTRATION; PERINEURAL
Status: COMPLETED
Start: 2021-01-01 | End: 2021-01-01

## 2021-01-01 RX ORDER — LORAZEPAM 2 MG/ML
4 INJECTION INTRAMUSCULAR EVERY 10 MIN PRN
Status: DISCONTINUED | OUTPATIENT
Start: 2021-01-01 | End: 2021-01-01

## 2021-01-01 RX ORDER — INSULIN ASPART 100 [IU]/ML
1-10 INJECTION, SOLUTION INTRAVENOUS; SUBCUTANEOUS EVERY 6 HOURS PRN
Status: DISCONTINUED | OUTPATIENT
Start: 2021-01-01 | End: 2021-01-01

## 2021-01-01 RX ORDER — PROPOFOL 10 MG/ML
VIAL (ML) INTRAVENOUS
Status: COMPLETED
Start: 2021-01-01 | End: 2021-01-01

## 2021-01-01 RX ORDER — FUROSEMIDE 10 MG/ML
INJECTION INTRAMUSCULAR; INTRAVENOUS
Status: COMPLETED
Start: 2021-01-01 | End: 2021-01-01

## 2021-01-01 RX ORDER — GUAIFENESIN 100 MG/5ML
200 SOLUTION ORAL ONCE
Status: COMPLETED | OUTPATIENT
Start: 2021-01-01 | End: 2021-01-01

## 2021-01-01 RX ORDER — SODIUM CHLORIDE 9 MG/ML
INJECTION, SOLUTION INTRAVENOUS CONTINUOUS
Status: DISCONTINUED | OUTPATIENT
Start: 2021-01-01 | End: 2021-06-26 | Stop reason: HOSPADM

## 2021-01-01 RX ORDER — IPRATROPIUM BROMIDE AND ALBUTEROL SULFATE 2.5; .5 MG/3ML; MG/3ML
3 SOLUTION RESPIRATORY (INHALATION) EVERY 4 HOURS PRN
Status: DISCONTINUED | OUTPATIENT
Start: 2021-01-01 | End: 2021-06-26 | Stop reason: HOSPADM

## 2021-01-01 RX ORDER — SUCCINYLCHOLINE CHLORIDE 20 MG/ML
160 INJECTION INTRAMUSCULAR; INTRAVENOUS ONCE
Status: COMPLETED | OUTPATIENT
Start: 2021-01-01 | End: 2021-01-01

## 2021-01-01 RX ORDER — HYDROCODONE BITARTRATE AND ACETAMINOPHEN 500; 5 MG/1; MG/1
TABLET ORAL CONTINUOUS
Status: ACTIVE | OUTPATIENT
Start: 2021-01-01 | End: 2021-01-01

## 2021-01-01 RX ORDER — LACTULOSE 10 G/15ML
200 SOLUTION ORAL; RECTAL 3 TIMES DAILY
Status: DISCONTINUED | OUTPATIENT
Start: 2021-01-01 | End: 2021-01-01

## 2021-01-01 RX ORDER — FENTANYL CITRATE 50 UG/ML
50 INJECTION, SOLUTION INTRAMUSCULAR; INTRAVENOUS
Status: DISCONTINUED | OUTPATIENT
Start: 2021-01-01 | End: 2021-06-26 | Stop reason: HOSPADM

## 2021-01-01 RX ADMIN — MEROPENEM 500 MG: 500 INJECTION INTRAVENOUS at 09:06

## 2021-01-01 RX ADMIN — VANCOMYCIN HYDROCHLORIDE 750 MG: 750 INJECTION, POWDER, LYOPHILIZED, FOR SOLUTION INTRAVENOUS at 08:06

## 2021-01-01 RX ADMIN — RIFAXIMIN 550 MG: 550 TABLET ORAL at 08:06

## 2021-01-01 RX ADMIN — INSULIN ASPART 2 UNITS: 100 INJECTION, SOLUTION INTRAVENOUS; SUBCUTANEOUS at 04:06

## 2021-01-01 RX ADMIN — PANTOPRAZOLE SODIUM 40 MG: 40 INJECTION, POWDER, FOR SOLUTION INTRAVENOUS at 09:06

## 2021-01-01 RX ADMIN — MEROPENEM AND SODIUM CHLORIDE 1 G: 1 INJECTION, SOLUTION INTRAVENOUS at 02:06

## 2021-01-01 RX ADMIN — PANTOPRAZOLE SODIUM 40 MG: 40 INJECTION, POWDER, FOR SOLUTION INTRAVENOUS at 08:06

## 2021-01-01 RX ADMIN — INSULIN ASPART 0 UNITS: 100 INJECTION, SOLUTION INTRAVENOUS; SUBCUTANEOUS at 04:06

## 2021-01-01 RX ADMIN — VANCOMYCIN HYDROCHLORIDE 500 MG: 500 INJECTION, POWDER, LYOPHILIZED, FOR SOLUTION INTRAVENOUS at 08:06

## 2021-01-01 RX ADMIN — MIDODRINE HYDROCHLORIDE 15 MG: 5 TABLET ORAL at 09:06

## 2021-01-01 RX ADMIN — MEROPENEM AND SODIUM CHLORIDE 1 G: 1 INJECTION, SOLUTION INTRAVENOUS at 10:06

## 2021-01-01 RX ADMIN — VASOPRESSIN 0.04 UNITS/MIN: 20 INJECTION INTRAVENOUS at 03:06

## 2021-01-01 RX ADMIN — Medication 0.12 MCG/KG/MIN: at 09:06

## 2021-01-01 RX ADMIN — OCTREOTIDE ACETATE 50 MCG/HR: 500 INJECTION, SOLUTION INTRAVENOUS; SUBCUTANEOUS at 04:06

## 2021-01-01 RX ADMIN — LACTULOSE 30 G: 10 SOLUTION ORAL at 10:06

## 2021-01-01 RX ADMIN — LORAZEPAM 2 MG: 2 INJECTION INTRAMUSCULAR; INTRAVENOUS at 08:06

## 2021-01-01 RX ADMIN — MEROPENEM AND SODIUM CHLORIDE 1 G: 1 INJECTION, SOLUTION INTRAVENOUS at 04:06

## 2021-01-01 RX ADMIN — Medication 0.14 MCG/KG/MIN: at 05:06

## 2021-01-01 RX ADMIN — VASOPRESSIN 0.04 UNITS/MIN: 20 INJECTION INTRAVENOUS at 09:06

## 2021-01-01 RX ADMIN — Medication 100 MG: at 08:06

## 2021-01-01 RX ADMIN — SODIUM CHLORIDE: 0.9 INJECTION, SOLUTION INTRAVENOUS at 12:06

## 2021-01-01 RX ADMIN — PIPERACILLIN SODIUM AND TAZOBACTAM SODIUM 4.5 G: 4; .5 INJECTION, POWDER, FOR SOLUTION INTRAVENOUS at 04:06

## 2021-01-01 RX ADMIN — FOLIC ACID 1 MG: 1 TABLET ORAL at 08:06

## 2021-01-01 RX ADMIN — INSULIN ASPART 1 UNITS: 100 INJECTION, SOLUTION INTRAVENOUS; SUBCUTANEOUS at 07:06

## 2021-01-01 RX ADMIN — Medication 0.46 MCG/KG/MIN: at 01:06

## 2021-01-01 RX ADMIN — FENTANYL CITRATE 50 MCG: 50 INJECTION INTRAMUSCULAR; INTRAVENOUS at 01:06

## 2021-01-01 RX ADMIN — INSULIN ASPART 2 UNITS: 100 INJECTION, SOLUTION INTRAVENOUS; SUBCUTANEOUS at 11:06

## 2021-01-01 RX ADMIN — LORAZEPAM 2 MG: 2 INJECTION INTRAMUSCULAR; INTRAVENOUS at 07:06

## 2021-01-01 RX ADMIN — VANCOMYCIN HYDROCHLORIDE 750 MG: 750 INJECTION, POWDER, LYOPHILIZED, FOR SOLUTION INTRAVENOUS at 11:06

## 2021-01-01 RX ADMIN — VANCOMYCIN HYDROCHLORIDE 125 MG: KIT at 05:06

## 2021-01-01 RX ADMIN — MIDODRINE HYDROCHLORIDE 15 MG: 5 TABLET ORAL at 02:06

## 2021-01-01 RX ADMIN — RIFAXIMIN 550 MG: 550 TABLET ORAL at 10:06

## 2021-01-01 RX ADMIN — SODIUM CHLORIDE: 0.9 INJECTION, SOLUTION INTRAVENOUS at 09:06

## 2021-01-01 RX ADMIN — OCTREOTIDE ACETATE 50 MCG/HR: 500 INJECTION, SOLUTION INTRAVENOUS; SUBCUTANEOUS at 12:06

## 2021-01-01 RX ADMIN — LORAZEPAM 2 MG: 0.5 TABLET ORAL at 10:06

## 2021-01-01 RX ADMIN — MEROPENEM AND SODIUM CHLORIDE 1 G: 1 INJECTION, SOLUTION INTRAVENOUS at 07:06

## 2021-01-01 RX ADMIN — MUPIROCIN: 20 OINTMENT TOPICAL at 09:06

## 2021-01-01 RX ADMIN — Medication 0.46 MCG/KG/MIN: at 07:06

## 2021-01-01 RX ADMIN — Medication 0.18 MCG/KG/MIN: at 11:06

## 2021-01-01 RX ADMIN — AZITHROMYCIN MONOHYDRATE 500 MG: 500 INJECTION, POWDER, LYOPHILIZED, FOR SOLUTION INTRAVENOUS at 06:06

## 2021-01-01 RX ADMIN — FOLIC ACID 1 MG: 5 INJECTION, SOLUTION INTRAMUSCULAR; INTRAVENOUS; SUBCUTANEOUS at 11:06

## 2021-01-01 RX ADMIN — SODIUM CHLORIDE: 0.9 INJECTION, SOLUTION INTRAVENOUS at 11:06

## 2021-01-01 RX ADMIN — EPINEPHRINE 2 MCG/KG/MIN: 1 INJECTION INTRAMUSCULAR; INTRAVENOUS; SUBCUTANEOUS at 02:06

## 2021-01-01 RX ADMIN — POTASSIUM CHLORIDE 10 MEQ: 7.46 INJECTION, SOLUTION INTRAVENOUS at 04:06

## 2021-01-01 RX ADMIN — Medication 0.14 MCG/KG/MIN: at 07:06

## 2021-01-01 RX ADMIN — Medication 1 TABLET: at 12:06

## 2021-01-01 RX ADMIN — AZITHROMYCIN MONOHYDRATE 500 MG: 250 TABLET ORAL at 08:06

## 2021-01-01 RX ADMIN — VANCOMYCIN HYDROCHLORIDE 125 MG: KIT at 11:06

## 2021-01-01 RX ADMIN — MIDODRINE HYDROCHLORIDE 15 MG: 5 TABLET ORAL at 03:06

## 2021-01-01 RX ADMIN — THIAMINE HYDROCHLORIDE 100 MG: 100 INJECTION, SOLUTION INTRAMUSCULAR; INTRAVENOUS at 07:06

## 2021-01-01 RX ADMIN — LACTULOSE 200 G: 10 SOLUTION ORAL at 11:06

## 2021-01-01 RX ADMIN — INSULIN ASPART 2 UNITS: 100 INJECTION, SOLUTION INTRAVENOUS; SUBCUTANEOUS at 12:06

## 2021-01-01 RX ADMIN — EPINEPHRINE 2 MCG/KG/MIN: 1 INJECTION INTRAMUSCULAR; INTRAVENOUS; SUBCUTANEOUS at 11:06

## 2021-01-01 RX ADMIN — MIDODRINE HYDROCHLORIDE 10 MG: 5 TABLET ORAL at 09:06

## 2021-01-01 RX ADMIN — SODIUM CHLORIDE: 0.9 INJECTION, SOLUTION INTRAVENOUS at 06:06

## 2021-01-01 RX ADMIN — PANTOPRAZOLE SODIUM 40 MG: 40 INJECTION, POWDER, FOR SOLUTION INTRAVENOUS at 10:06

## 2021-01-01 RX ADMIN — PIPERACILLIN SODIUM AND TAZOBACTAM SODIUM 4.5 G: 4; .5 INJECTION, POWDER, FOR SOLUTION INTRAVENOUS at 09:06

## 2021-01-01 RX ADMIN — NOREPINEPHRINE BITARTRATE 3 MCG/KG/MIN: 1 INJECTION, SOLUTION, CONCENTRATE INTRAVENOUS at 05:06

## 2021-01-01 RX ADMIN — VANCOMYCIN HYDROCHLORIDE 500 MG: 500 INJECTION, POWDER, LYOPHILIZED, FOR SOLUTION INTRAVENOUS at 09:06

## 2021-01-01 RX ADMIN — GUAIFENESIN 200 MG: 200 SOLUTION ORAL at 08:06

## 2021-01-01 RX ADMIN — HYDROCORTISONE SODIUM SUCCINATE 100 MG: 100 INJECTION, POWDER, FOR SOLUTION INTRAMUSCULAR; INTRAVENOUS at 02:06

## 2021-01-01 RX ADMIN — VASOPRESSIN 0.04 UNITS/MIN: 20 INJECTION INTRAVENOUS at 07:06

## 2021-01-01 RX ADMIN — DEXTROSE MONOHYDRATE 12.5 G: 25 INJECTION, SOLUTION INTRAVENOUS at 06:06

## 2021-01-01 RX ADMIN — LACTULOSE 30 G: 10 SOLUTION ORAL at 08:06

## 2021-01-01 RX ADMIN — MIDODRINE HYDROCHLORIDE 15 MG: 5 TABLET ORAL at 08:06

## 2021-01-01 RX ADMIN — VANCOMYCIN HYDROCHLORIDE 750 MG: 750 INJECTION, POWDER, LYOPHILIZED, FOR SOLUTION INTRAVENOUS at 02:06

## 2021-01-01 RX ADMIN — OCTREOTIDE ACETATE 50 MCG/HR: 500 INJECTION, SOLUTION INTRAVENOUS; SUBCUTANEOUS at 11:06

## 2021-01-01 RX ADMIN — SODIUM CHLORIDE: 0.9 INJECTION, SOLUTION INTRAVENOUS at 02:06

## 2021-01-01 RX ADMIN — INSULIN ASPART 4 UNITS: 100 INJECTION, SOLUTION INTRAVENOUS; SUBCUTANEOUS at 11:06

## 2021-01-01 RX ADMIN — IPRATROPIUM BROMIDE AND ALBUTEROL SULFATE 3 ML: .5; 2.5 SOLUTION RESPIRATORY (INHALATION) at 07:06

## 2021-01-01 RX ADMIN — POTASSIUM CHLORIDE 40 MEQ: 29.8 INJECTION, SOLUTION INTRAVENOUS at 03:06

## 2021-01-01 RX ADMIN — Medication 0.34 MCG/KG/MIN: at 02:06

## 2021-01-01 RX ADMIN — IPRATROPIUM BROMIDE AND ALBUTEROL SULFATE 3 ML: .5; 2.5 SOLUTION RESPIRATORY (INHALATION) at 08:06

## 2021-01-01 RX ADMIN — HYDROCORTISONE SODIUM SUCCINATE 100 MG: 100 INJECTION, POWDER, FOR SOLUTION INTRAMUSCULAR; INTRAVENOUS at 10:06

## 2021-01-01 RX ADMIN — RIFAXIMIN 550 MG: 550 TABLET ORAL at 09:06

## 2021-01-01 RX ADMIN — HYDROCORTISONE SODIUM SUCCINATE 100 MG: 100 INJECTION, POWDER, FOR SOLUTION INTRAMUSCULAR; INTRAVENOUS at 05:06

## 2021-01-01 RX ADMIN — SODIUM CHLORIDE: 0.9 INJECTION, SOLUTION INTRAVENOUS at 04:06

## 2021-01-01 RX ADMIN — LACTULOSE 30 G: 10 SOLUTION ORAL at 09:06

## 2021-01-01 RX ADMIN — VASOPRESSIN 0.04 UNITS/MIN: 20 INJECTION INTRAVENOUS at 08:06

## 2021-01-01 RX ADMIN — LORAZEPAM 2 MG: 2 INJECTION INTRAMUSCULAR; INTRAVENOUS at 05:06

## 2021-01-01 RX ADMIN — OCTREOTIDE ACETATE 50 MCG/HR: 500 INJECTION, SOLUTION INTRAVENOUS; SUBCUTANEOUS at 03:06

## 2021-01-01 RX ADMIN — FUROSEMIDE 80 MG: 10 INJECTION, SOLUTION INTRAMUSCULAR; INTRAVENOUS at 03:06

## 2021-01-01 RX ADMIN — IOHEXOL 75 ML: 350 INJECTION, SOLUTION INTRAVENOUS at 06:06

## 2021-01-01 RX ADMIN — Medication 1 TABLET: at 09:06

## 2021-01-01 RX ADMIN — Medication 0.46 MCG/KG/MIN: at 06:06

## 2021-01-01 RX ADMIN — Medication 0.1 MCG/KG/MIN: at 02:06

## 2021-01-01 RX ADMIN — LORAZEPAM 2 MG: 0.5 TABLET ORAL at 12:06

## 2021-01-01 RX ADMIN — SODIUM PHOSPHATE, MONOBASIC, MONOHYDRATE 39.99 MMOL: 276; 142 INJECTION, SOLUTION INTRAVENOUS at 01:06

## 2021-01-01 RX ADMIN — HYDROCORTISONE SODIUM SUCCINATE 100 MG: 100 INJECTION, POWDER, FOR SOLUTION INTRAMUSCULAR; INTRAVENOUS at 09:06

## 2021-01-01 RX ADMIN — Medication: at 08:06

## 2021-01-01 RX ADMIN — Medication 1 TABLET: at 08:06

## 2021-01-01 RX ADMIN — LACTULOSE 30 G: 10 SOLUTION ORAL at 12:06

## 2021-01-01 RX ADMIN — VANCOMYCIN HYDROCHLORIDE 125 MG: KIT at 12:06

## 2021-01-01 RX ADMIN — MEROPENEM AND SODIUM CHLORIDE 1 G: 1 INJECTION, SOLUTION INTRAVENOUS at 08:06

## 2021-01-01 RX ADMIN — MEROPENEM AND SODIUM CHLORIDE 1 G: 1 INJECTION, SOLUTION INTRAVENOUS at 01:06

## 2021-01-01 RX ADMIN — CEFTRIAXONE 1 G: 1 INJECTION, POWDER, FOR SOLUTION INTRAMUSCULAR; INTRAVENOUS at 01:06

## 2021-01-01 RX ADMIN — Medication 100 MG: at 09:06

## 2021-01-01 RX ADMIN — INSULIN DETEMIR 10 UNITS: 100 INJECTION, SOLUTION SUBCUTANEOUS at 08:06

## 2021-01-01 RX ADMIN — MICAFUNGIN SODIUM 100 MG: 20 INJECTION, POWDER, LYOPHILIZED, FOR SOLUTION INTRAVENOUS at 09:06

## 2021-01-01 RX ADMIN — VANCOMYCIN HYDROCHLORIDE 125 MG: KIT at 01:06

## 2021-01-01 RX ADMIN — Medication 0.1 MCG/KG/MIN: at 10:06

## 2021-01-01 RX ADMIN — SODIUM CHLORIDE: 0.9 INJECTION, SOLUTION INTRAVENOUS at 08:06

## 2021-01-01 RX ADMIN — Medication 0.02 MCG/KG/MIN: at 09:06

## 2021-01-01 RX ADMIN — LORAZEPAM 2 MG: 2 INJECTION INTRAMUSCULAR; INTRAVENOUS at 01:06

## 2021-01-01 RX ADMIN — MEROPENEM AND SODIUM CHLORIDE 1 G: 1 INJECTION, SOLUTION INTRAVENOUS at 06:06

## 2021-01-01 RX ADMIN — Medication 0.04 MCG/KG/MIN: at 11:06

## 2021-01-01 RX ADMIN — PHYTONADIONE 10 MG: 10 INJECTION, EMULSION INTRAMUSCULAR; INTRAVENOUS; SUBCUTANEOUS at 12:06

## 2021-01-01 RX ADMIN — PIPERACILLIN SODIUM AND TAZOBACTAM SODIUM 4.5 G: 4; .5 INJECTION, POWDER, FOR SOLUTION INTRAVENOUS at 11:06

## 2021-01-01 RX ADMIN — Medication 0.34 MCG/KG/MIN: at 09:06

## 2021-01-01 RX ADMIN — FOLIC ACID 1 MG: 1 TABLET ORAL at 09:06

## 2021-01-01 RX ADMIN — Medication 0.46 MCG/KG/MIN: at 09:06

## 2021-01-01 RX ADMIN — NOREPINEPHRINE BITARTRATE 3 MCG/KG/MIN: 1 INJECTION, SOLUTION, CONCENTRATE INTRAVENOUS at 01:06

## 2021-01-01 RX ADMIN — MICAFUNGIN SODIUM 100 MG: 20 INJECTION, POWDER, LYOPHILIZED, FOR SOLUTION INTRAVENOUS at 11:06

## 2021-01-01 RX ADMIN — LORAZEPAM 2 MG: 2 INJECTION INTRAMUSCULAR; INTRAVENOUS at 02:06

## 2021-01-01 RX ADMIN — LORAZEPAM 2 MG: 2 INJECTION INTRAMUSCULAR; INTRAVENOUS at 03:06

## 2021-01-01 RX ADMIN — NOREPINEPHRINE BITARTRATE 3 MCG/KG/MIN: 1 INJECTION, SOLUTION, CONCENTRATE INTRAVENOUS at 09:06

## 2021-01-01 RX ADMIN — LORAZEPAM 2 MG: 0.5 TABLET ORAL at 11:06

## 2021-01-01 RX ADMIN — VANCOMYCIN HYDROCHLORIDE 125 MG: KIT at 06:06

## 2021-01-01 RX ADMIN — NOREPINEPHRINE BITARTRATE 1.5 MCG/KG/MIN: 1 INJECTION, SOLUTION, CONCENTRATE INTRAVENOUS at 03:06

## 2021-01-01 RX ADMIN — ALBUMIN (HUMAN) 25 G: 12.5 SOLUTION INTRAVENOUS at 10:06

## 2021-01-01 RX ADMIN — HYDROCORTISONE SODIUM SUCCINATE 100 MG: 100 INJECTION, POWDER, FOR SOLUTION INTRAMUSCULAR; INTRAVENOUS at 01:06

## 2021-01-01 RX ADMIN — Medication 0.06 MCG/KG/MIN: at 05:06

## 2021-01-01 RX ADMIN — POTASSIUM CHLORIDE 10 MEQ: 7.46 INJECTION, SOLUTION INTRAVENOUS at 12:06

## 2021-01-01 RX ADMIN — Medication 4 MG/HR: at 01:06

## 2021-01-01 RX ADMIN — VANCOMYCIN HYDROCHLORIDE 750 MG: 750 INJECTION, POWDER, LYOPHILIZED, FOR SOLUTION INTRAVENOUS at 07:06

## 2021-01-01 RX ADMIN — Medication 0.08 MCG/KG/MIN: at 03:06

## 2021-01-01 RX ADMIN — LORAZEPAM 2 MG: 0.5 TABLET ORAL at 03:06

## 2021-01-01 RX ADMIN — AZITHROMYCIN MONOHYDRATE 500 MG: 500 INJECTION, POWDER, LYOPHILIZED, FOR SOLUTION INTRAVENOUS at 03:06

## 2021-01-01 RX ADMIN — HYDROCORTISONE SODIUM SUCCINATE 50 MG: 100 INJECTION, POWDER, FOR SOLUTION INTRAMUSCULAR; INTRAVENOUS at 06:06

## 2021-01-01 RX ADMIN — PHYTONADIONE 10 MG: 10 INJECTION, EMULSION INTRAMUSCULAR; INTRAVENOUS; SUBCUTANEOUS at 09:06

## 2021-01-01 RX ADMIN — PROPOFOL 10 MCG/KG/MIN: 10 INJECTION, EMULSION INTRAVENOUS at 09:06

## 2021-01-01 RX ADMIN — PANTOPRAZOLE SODIUM 40 MG: 40 INJECTION, POWDER, FOR SOLUTION INTRAVENOUS at 11:06

## 2021-01-01 RX ADMIN — VASOPRESSIN 0.04 UNITS/MIN: 20 INJECTION INTRAVENOUS at 04:06

## 2021-01-01 RX ADMIN — MIDODRINE HYDROCHLORIDE 15 MG: 5 TABLET ORAL at 10:06

## 2021-01-01 RX ADMIN — OXYCODONE HYDROCHLORIDE 5 MG: 5 TABLET ORAL at 08:06

## 2021-01-01 RX ADMIN — CALCIUM GLUCONATE 30 G: 98 INJECTION, SOLUTION INTRAVENOUS at 03:06

## 2021-01-01 RX ADMIN — FENTANYL CITRATE 50 MCG: 50 INJECTION INTRAMUSCULAR; INTRAVENOUS at 07:06

## 2021-01-01 RX ADMIN — PHYTONADIONE 10 MG: 10 INJECTION, EMULSION INTRAMUSCULAR; INTRAVENOUS; SUBCUTANEOUS at 07:06

## 2021-01-01 RX ADMIN — LORAZEPAM 2 MG: 2 INJECTION INTRAMUSCULAR; INTRAVENOUS at 10:06

## 2021-01-01 RX ADMIN — Medication 1 TABLET: at 10:06

## 2021-01-01 RX ADMIN — FOLIC ACID 1 MG: 5 INJECTION, SOLUTION INTRAMUSCULAR; INTRAVENOUS; SUBCUTANEOUS at 10:06

## 2021-01-01 RX ADMIN — Medication 0.01 MCG/KG/MIN: at 12:06

## 2021-01-01 RX ADMIN — VASOPRESSIN 0.04 UNITS/MIN: 20 INJECTION INTRAVENOUS at 06:06

## 2021-01-01 RX ADMIN — MICAFUNGIN SODIUM 100 MG: 20 INJECTION, POWDER, LYOPHILIZED, FOR SOLUTION INTRAVENOUS at 12:06

## 2021-01-01 RX ADMIN — PROPOFOL 10 MCG/KG/MIN: 10 INJECTION, EMULSION INTRAVENOUS at 06:06

## 2021-01-01 RX ADMIN — OCTREOTIDE ACETATE 50 MCG/HR: 500 INJECTION, SOLUTION INTRAVENOUS; SUBCUTANEOUS at 09:06

## 2021-01-01 RX ADMIN — OLANZAPINE 5 MG: 10 INJECTION, POWDER, LYOPHILIZED, FOR SOLUTION INTRAMUSCULAR at 11:06

## 2021-01-01 RX ADMIN — VANCOMYCIN HYDROCHLORIDE 1000 MG: 1 INJECTION, POWDER, LYOPHILIZED, FOR SOLUTION INTRAVENOUS at 02:06

## 2021-01-01 RX ADMIN — LACTULOSE 30 G: 10 SOLUTION ORAL at 06:06

## 2021-01-01 RX ADMIN — PROPOFOL 10 MCG/KG/MIN: 10 INJECTION, EMULSION INTRAVENOUS at 04:06

## 2021-01-01 RX ADMIN — PIPERACILLIN SODIUM AND TAZOBACTAM SODIUM 4.5 G: 4; .5 INJECTION, POWDER, FOR SOLUTION INTRAVENOUS at 03:06

## 2021-01-01 RX ADMIN — CHLORHEXIDINE GLUCONATE 0.12% ORAL RINSE 15 ML: 1.2 LIQUID ORAL at 09:06

## 2021-01-01 RX ADMIN — FOLIC ACID 1 MG: 1 TABLET ORAL at 10:06

## 2021-01-01 RX ADMIN — VASOPRESSIN 0.04 UNITS/MIN: 20 INJECTION INTRAVENOUS at 11:06

## 2021-01-01 RX ADMIN — MAGNESIUM SULFATE 2 G: 2 INJECTION INTRAVENOUS at 09:06

## 2021-01-01 RX ADMIN — SODIUM CHLORIDE: 0.9 INJECTION, SOLUTION INTRAVENOUS at 05:06

## 2021-01-01 RX ADMIN — VASOPRESSIN 0.04 UNITS/MIN: 20 INJECTION INTRAVENOUS at 05:06

## 2021-01-01 RX ADMIN — LORAZEPAM 1 MG: 2 INJECTION INTRAMUSCULAR; INTRAVENOUS at 05:06

## 2021-01-01 RX ADMIN — PANTOPRAZOLE SODIUM 80 MG: 40 INJECTION, POWDER, FOR SOLUTION INTRAVENOUS at 06:06

## 2021-01-01 RX ADMIN — Medication 0.44 MCG/KG/MIN: at 02:06

## 2021-01-01 RX ADMIN — SODIUM CHLORIDE 500 ML: 0.9 INJECTION, SOLUTION INTRAVENOUS at 02:06

## 2021-01-01 RX ADMIN — MUPIROCIN: 20 OINTMENT TOPICAL at 08:06

## 2021-01-01 RX ADMIN — INSULIN ASPART 2 UNITS: 100 INJECTION, SOLUTION INTRAVENOUS; SUBCUTANEOUS at 08:06

## 2021-01-01 RX ADMIN — SODIUM CHLORIDE: 0.9 INJECTION, SOLUTION INTRAVENOUS at 10:06

## 2021-01-01 RX ADMIN — LORAZEPAM 2 MG: 2 INJECTION INTRAMUSCULAR; INTRAVENOUS at 12:06

## 2021-01-01 RX ADMIN — PHYTONADIONE 10 MG: 10 INJECTION, EMULSION INTRAMUSCULAR; INTRAVENOUS; SUBCUTANEOUS at 06:06

## 2021-01-01 RX ADMIN — MICAFUNGIN SODIUM 100 MG: 20 INJECTION, POWDER, LYOPHILIZED, FOR SOLUTION INTRAVENOUS at 10:06

## 2021-01-01 RX ADMIN — VASOPRESSIN 0.04 UNITS/MIN: 20 INJECTION INTRAVENOUS at 02:06

## 2021-01-01 RX ADMIN — MEROPENEM AND SODIUM CHLORIDE 1 G: 1 INJECTION, SOLUTION INTRAVENOUS at 05:06

## 2021-01-01 RX ADMIN — HYPROMELLOSE 2910 1 DROP: 5 SOLUTION OPHTHALMIC at 02:06

## 2021-01-01 RX ADMIN — HALOPERIDOL LACTATE 5 MG: 5 INJECTION, SOLUTION INTRAMUSCULAR at 06:06

## 2021-01-01 RX ADMIN — DEXMEDETOMIDINE HYDROCHLORIDE 400 MCG: 4 INJECTION, SOLUTION INTRAVENOUS at 09:06

## 2021-01-01 RX ADMIN — MEROPENEM AND SODIUM CHLORIDE 1 G: 1 INJECTION, SOLUTION INTRAVENOUS at 09:06

## 2021-01-01 RX ADMIN — CHLORHEXIDINE GLUCONATE 0.12% ORAL RINSE 15 ML: 1.2 LIQUID ORAL at 08:06

## 2021-01-01 RX ADMIN — Medication 0.38 MCG/KG/MIN: at 02:06

## 2021-01-01 RX ADMIN — METOLAZONE 5 MG: 2.5 TABLET ORAL at 12:06

## 2021-01-01 RX ADMIN — INSULIN ASPART 3 UNITS: 100 INJECTION, SOLUTION INTRAVENOUS; SUBCUTANEOUS at 08:06

## 2021-01-01 RX ADMIN — FOLIC ACID 1 MG: 5 INJECTION, SOLUTION INTRAMUSCULAR; INTRAVENOUS; SUBCUTANEOUS at 08:06

## 2021-01-01 RX ADMIN — LORAZEPAM 1 MG: 2 INJECTION INTRAMUSCULAR; INTRAVENOUS at 10:06

## 2021-01-01 RX ADMIN — LACTULOSE 200 G: 10 SOLUTION ORAL at 03:06

## 2021-01-01 RX ADMIN — THIAMINE HYDROCHLORIDE 100 MG: 100 INJECTION, SOLUTION INTRAMUSCULAR; INTRAVENOUS at 09:06

## 2021-01-01 RX ADMIN — MAGNESIUM SULFATE 2 G: 2 INJECTION INTRAVENOUS at 12:06

## 2021-01-01 RX ADMIN — NOREPINEPHRINE BITARTRATE 1.28 MCG/KG/MIN: 1 INJECTION, SOLUTION, CONCENTRATE INTRAVENOUS at 02:06

## 2021-01-01 RX ADMIN — LACTULOSE 20 G: 10 SOLUTION ORAL at 08:06

## 2021-01-01 RX ADMIN — LORAZEPAM 2 MG: 0.5 TABLET ORAL at 06:06

## 2021-01-01 RX ADMIN — Medication 0.42 MCG/KG/MIN: at 12:06

## 2021-01-01 RX ADMIN — LACTULOSE 20 G: 10 SOLUTION ORAL at 09:06

## 2021-01-01 RX ADMIN — IPRATROPIUM BROMIDE AND ALBUTEROL SULFATE 3 ML: .5; 2.5 SOLUTION RESPIRATORY (INHALATION) at 02:06

## 2021-01-01 RX ADMIN — IOHEXOL 75 ML: 350 INJECTION, SOLUTION INTRAVENOUS at 11:06

## 2021-01-01 RX ADMIN — SODIUM PHOSPHATE, MONOBASIC, MONOHYDRATE 30 MMOL: 276; 142 INJECTION, SOLUTION INTRAVENOUS at 12:06

## 2021-01-01 RX ADMIN — DEXTROSE AND SODIUM CHLORIDE: 5; .9 INJECTION, SOLUTION INTRAVENOUS at 11:06

## 2021-01-01 RX ADMIN — VANCOMYCIN HYDROCHLORIDE 750 MG: 750 INJECTION, POWDER, LYOPHILIZED, FOR SOLUTION INTRAVENOUS at 05:06

## 2021-01-01 RX ADMIN — SUCCINYLCHOLINE CHLORIDE 160 MG: 20 INJECTION, SOLUTION INTRAMUSCULAR; INTRAVENOUS; PARENTERAL at 05:06

## 2021-01-01 RX ADMIN — Medication 0.3 MCG/KG/MIN: at 05:06

## 2021-01-01 RX ADMIN — LIDOCAINE HYDROCHLORIDE 200 MG: 10 INJECTION, SOLUTION INFILTRATION; PERINEURAL at 04:06

## 2021-01-01 RX ADMIN — Medication 0.28 MCG/KG/MIN: at 02:06

## 2021-01-01 RX ADMIN — VANCOMYCIN HYDROCHLORIDE 500 MG: 500 INJECTION, POWDER, LYOPHILIZED, FOR SOLUTION INTRAVENOUS at 10:06

## 2021-01-01 RX ADMIN — Medication 0.02 MCG/KG/MIN: at 08:06

## 2021-01-01 RX ADMIN — VASOPRESSIN 0.04 UNITS/MIN: 20 INJECTION INTRAVENOUS at 01:06

## 2021-01-01 RX ADMIN — INSULIN ASPART 1 UNITS: 100 INJECTION, SOLUTION INTRAVENOUS; SUBCUTANEOUS at 08:06

## 2021-01-01 RX ADMIN — MICAFUNGIN SODIUM 100 MG: 20 INJECTION, POWDER, LYOPHILIZED, FOR SOLUTION INTRAVENOUS at 02:06

## 2021-01-01 RX ADMIN — HYDROCORTISONE SODIUM SUCCINATE 100 MG: 100 INJECTION, POWDER, FOR SOLUTION INTRAMUSCULAR; INTRAVENOUS at 11:06

## 2021-01-01 RX ADMIN — VASOPRESSIN 0.04 UNITS/MIN: 20 INJECTION INTRAVENOUS at 12:06

## 2021-01-01 RX ADMIN — MIDAZOLAM 1 MG: 1 INJECTION INTRAMUSCULAR; INTRAVENOUS at 08:06

## 2021-01-01 RX ADMIN — OCTREOTIDE ACETATE 50 MCG/HR: 500 INJECTION, SOLUTION INTRAVENOUS; SUBCUTANEOUS at 07:06

## 2021-01-01 RX ADMIN — POTASSIUM CHLORIDE 10 MEQ: 7.46 INJECTION, SOLUTION INTRAVENOUS at 11:06

## 2021-01-01 RX ADMIN — Medication 0.08 MCG/KG/MIN: at 06:06

## 2021-01-01 RX ADMIN — LACTULOSE 30 G: 10 SOLUTION ORAL at 05:06

## 2021-01-01 RX ADMIN — OCTREOTIDE ACETATE 50 MCG/HR: 500 INJECTION, SOLUTION INTRAVENOUS; SUBCUTANEOUS at 06:06

## 2021-01-01 RX ADMIN — AZITHROMYCIN MONOHYDRATE 500 MG: 500 INJECTION, POWDER, LYOPHILIZED, FOR SOLUTION INTRAVENOUS at 04:06

## 2021-01-01 RX ADMIN — INSULIN ASPART 3 UNITS: 100 INJECTION, SOLUTION INTRAVENOUS; SUBCUTANEOUS at 09:06

## 2021-01-01 RX ADMIN — NOREPINEPHRINE BITARTRATE 1.7 MCG/KG/MIN: 1 INJECTION, SOLUTION, CONCENTRATE INTRAVENOUS at 08:06

## 2021-01-01 RX ADMIN — THIAMINE HYDROCHLORIDE 100 MG: 100 INJECTION, SOLUTION INTRAMUSCULAR; INTRAVENOUS at 10:06

## 2021-01-01 RX ADMIN — PIPERACILLIN SODIUM AND TAZOBACTAM SODIUM 4.5 G: 4; .5 INJECTION, POWDER, FOR SOLUTION INTRAVENOUS at 10:06

## 2021-01-01 RX ADMIN — FUROSEMIDE 80 MG: 10 INJECTION, SOLUTION INTRAMUSCULAR; INTRAVENOUS at 04:06

## 2021-01-01 RX ADMIN — HYPROMELLOSE 2910 1 DROP: 5 SOLUTION OPHTHALMIC at 06:06

## 2021-01-01 RX ADMIN — PIPERACILLIN SODIUM AND TAZOBACTAM SODIUM 4.5 G: 4; .5 INJECTION, POWDER, FOR SOLUTION INTRAVENOUS at 06:06

## 2021-01-01 RX ADMIN — HYDROCORTISONE SODIUM SUCCINATE 100 MG: 100 INJECTION, POWDER, FOR SOLUTION INTRAMUSCULAR; INTRAVENOUS at 06:06

## 2021-01-01 RX ADMIN — MEROPENEM AND SODIUM CHLORIDE 1 G: 1 INJECTION, SOLUTION INTRAVENOUS at 12:06

## 2021-01-01 RX ADMIN — LORAZEPAM 1 MG: 2 INJECTION INTRAMUSCULAR; INTRAVENOUS at 01:06

## 2021-01-01 RX ADMIN — FUROSEMIDE 80 MG: 10 INJECTION, SOLUTION INTRAMUSCULAR; INTRAVENOUS at 01:06

## 2021-01-01 RX ADMIN — LORAZEPAM 2 MG: 2 INJECTION INTRAMUSCULAR; INTRAVENOUS at 04:06

## 2021-01-01 RX ADMIN — SODIUM PHOSPHATE, MONOBASIC, MONOHYDRATE AND SODIUM PHOSPHATE, DIBASIC, ANHYDROUS 20.01 MMOL: 276; 142 INJECTION, SOLUTION INTRAVENOUS at 11:06

## 2021-01-01 RX ADMIN — IPRATROPIUM BROMIDE AND ALBUTEROL SULFATE 3 ML: .5; 2.5 SOLUTION RESPIRATORY (INHALATION) at 12:06

## 2021-01-01 RX ADMIN — PHYTONADIONE 10 MG: 10 INJECTION, EMULSION INTRAMUSCULAR; INTRAVENOUS; SUBCUTANEOUS at 08:06

## 2021-01-01 RX ADMIN — LORAZEPAM 2 MG: 0.5 TABLET ORAL at 07:06

## 2021-01-01 RX ADMIN — Medication 0.02 MCG/KG/MIN: at 06:06

## 2021-01-01 RX ADMIN — EPINEPHRINE 2 MCG/KG/MIN: 1 INJECTION INTRAMUSCULAR; INTRAVENOUS; SUBCUTANEOUS at 09:06

## 2021-01-01 RX ADMIN — VASOPRESSIN 20 UNITS: 20 INJECTION INTRAVENOUS at 01:06

## 2021-01-01 RX ADMIN — SODIUM CHLORIDE: 0.9 INJECTION, SOLUTION INTRAVENOUS at 07:06

## 2021-01-01 RX ADMIN — Medication 0.22 MCG/KG/MIN: at 07:06

## 2021-01-01 RX ADMIN — EPINEPHRINE 2 MCG/KG/MIN: 1 INJECTION INTRAMUSCULAR; INTRAVENOUS; SUBCUTANEOUS at 06:06

## 2021-01-01 RX ADMIN — THIAMINE HYDROCHLORIDE 100 MG: 100 INJECTION, SOLUTION INTRAMUSCULAR; INTRAVENOUS at 11:06

## 2021-01-01 RX ADMIN — DEXMEDETOMIDINE HYDROCHLORIDE 0.2 MCG/KG/HR: 4 INJECTION, SOLUTION INTRAVENOUS at 02:06

## 2021-01-01 RX ADMIN — MEROPENEM AND SODIUM CHLORIDE 1 G: 1 INJECTION, SOLUTION INTRAVENOUS at 03:06

## 2021-01-01 RX ADMIN — DEXMEDETOMIDINE HYDROCHLORIDE 1 MCG/KG/HR: 4 INJECTION, SOLUTION INTRAVENOUS at 09:06

## 2021-01-01 RX ADMIN — HYDROCORTISONE SODIUM SUCCINATE 100 MG: 100 INJECTION, POWDER, FOR SOLUTION INTRAMUSCULAR; INTRAVENOUS at 12:06

## 2021-01-01 RX ADMIN — ROCURONIUM BROMIDE 2 MG: 10 INJECTION INTRAVENOUS at 05:06

## 2021-01-01 RX ADMIN — NOREPINEPHRINE BITARTRATE 3 MCG/KG/MIN: 1 INJECTION, SOLUTION, CONCENTRATE INTRAVENOUS at 10:06

## 2021-01-01 RX ADMIN — IPRATROPIUM BROMIDE AND ALBUTEROL SULFATE 3 ML: .5; 2.5 SOLUTION RESPIRATORY (INHALATION) at 01:06

## 2021-01-01 RX ADMIN — MIDODRINE HYDROCHLORIDE 15 MG: 5 TABLET ORAL at 04:06

## 2021-01-01 RX ADMIN — POTASSIUM CHLORIDE 10 MEQ: 7.46 INJECTION, SOLUTION INTRAVENOUS at 09:06

## 2021-01-01 RX ADMIN — POTASSIUM CHLORIDE 10 MEQ: 7.46 INJECTION, SOLUTION INTRAVENOUS at 01:06

## 2021-01-01 RX ADMIN — POTASSIUM CHLORIDE 40 MEQ: 29.8 INJECTION, SOLUTION INTRAVENOUS at 09:06

## 2021-01-01 RX ADMIN — VASOPRESSIN 0.04 UNITS/MIN: 20 INJECTION INTRAVENOUS at 10:06

## 2021-01-01 RX ADMIN — Medication 0.14 MCG/KG/MIN: at 08:06

## 2021-01-01 RX ADMIN — Medication 100 MG: at 10:06

## 2021-01-01 RX ADMIN — CALCIUM GLUCONATE 1 G: 98 INJECTION, SOLUTION INTRAVENOUS at 02:06

## 2021-01-01 RX ADMIN — Medication 0.22 MCG/KG/MIN: at 12:06

## 2021-01-01 RX ADMIN — LACTULOSE 20 G: 10 SOLUTION ORAL at 01:06

## 2021-01-01 RX ADMIN — EPINEPHRINE 1.5 MCG/KG/MIN: 1 INJECTION INTRAMUSCULAR; INTRAVENOUS; SUBCUTANEOUS at 07:06

## 2021-01-01 RX ADMIN — ALBUMIN (HUMAN) 50 G: 12.5 SOLUTION INTRAVENOUS at 05:06

## 2021-01-01 RX ADMIN — EPINEPHRINE 1.1 MCG/KG/MIN: 1 INJECTION INTRAMUSCULAR; INTRAVENOUS; SUBCUTANEOUS at 05:06

## 2021-01-01 RX ADMIN — Medication 0.14 MCG/KG/MIN: at 04:06

## 2021-01-01 RX ADMIN — VANCOMYCIN HYDROCHLORIDE 500 MG: 500 INJECTION, POWDER, LYOPHILIZED, FOR SOLUTION INTRAVENOUS at 02:06

## 2021-01-01 RX ADMIN — SODIUM PHOSPHATE, MONOBASIC, MONOHYDRATE AND SODIUM PHOSPHATE, DIBASIC, ANHYDROUS 39.99 MMOL: 276; 142 INJECTION, SOLUTION INTRAVENOUS at 02:06

## 2021-01-01 RX ADMIN — EPINEPHRINE 0.95 MCG/KG/MIN: 1 INJECTION INTRAMUSCULAR; INTRAVENOUS; SUBCUTANEOUS at 02:06

## 2021-01-01 RX ADMIN — DEXMEDETOMIDINE HYDROCHLORIDE 0.4 MCG/KG/HR: 4 INJECTION, SOLUTION INTRAVENOUS at 07:06

## 2021-01-01 RX ADMIN — ROCURONIUM BROMIDE: 10 INJECTION, SOLUTION INTRAVENOUS at 08:06

## 2021-01-01 RX ADMIN — LACTULOSE 20 G: 10 SOLUTION ORAL at 02:06

## 2021-01-01 RX ADMIN — POTASSIUM CHLORIDE 50 MEQ: 7.46 INJECTION, SOLUTION INTRAVENOUS at 01:06

## 2021-01-01 RX ADMIN — NOREPINEPHRINE BITARTRATE 3 MCG/KG/MIN: 1 INJECTION, SOLUTION, CONCENTRATE INTRAVENOUS at 08:06

## 2021-01-01 RX ADMIN — LACTULOSE 200 G: 10 SOLUTION ORAL at 08:06

## 2021-01-01 RX ADMIN — Medication 1 MG/HR: at 05:06

## 2021-01-01 RX ADMIN — FENTANYL CITRATE 50 MCG: 50 INJECTION INTRAMUSCULAR; INTRAVENOUS at 06:06

## 2021-01-01 RX ADMIN — Medication 0.65 MCG/KG/MIN: at 02:06

## 2021-01-01 RX ADMIN — Medication 0.08 MCG/KG/MIN: at 02:06

## 2021-01-01 RX ADMIN — MICAFUNGIN SODIUM 100 MG: 20 INJECTION, POWDER, LYOPHILIZED, FOR SOLUTION INTRAVENOUS at 04:06

## 2021-01-01 RX ADMIN — EPINEPHRINE 5 MG: 1 INJECTION, SOLUTION, CONCENTRATE INTRAVENOUS at 09:06

## 2021-01-01 RX ADMIN — PROPOFOL: 10 INJECTION, EMULSION INTRAVENOUS at 08:06

## 2021-01-01 RX ADMIN — Medication 0.44 MCG/KG/MIN: at 10:06

## 2021-01-01 RX ADMIN — INSULIN DETEMIR 6 UNITS: 100 INJECTION, SOLUTION SUBCUTANEOUS at 08:06

## 2021-01-01 RX ADMIN — DEXMEDETOMIDINE HYDROCHLORIDE 0.7 MCG/KG/HR: 4 INJECTION, SOLUTION INTRAVENOUS at 10:06

## 2021-01-01 RX ADMIN — IOHEXOL 75 ML: 350 INJECTION, SOLUTION INTRAVENOUS at 02:06

## 2021-01-01 RX ADMIN — NOREPINEPHRINE BITARTRATE 1.22 MCG/KG/MIN: 1 INJECTION, SOLUTION, CONCENTRATE INTRAVENOUS at 03:06

## 2021-01-01 RX ADMIN — VANCOMYCIN HYDROCHLORIDE 750 MG: 750 INJECTION, POWDER, LYOPHILIZED, FOR SOLUTION INTRAVENOUS at 06:06

## 2021-01-01 RX ADMIN — VANCOMYCIN HYDROCHLORIDE 1250 MG: 1.25 INJECTION, POWDER, LYOPHILIZED, FOR SOLUTION INTRAVENOUS at 03:06

## 2021-01-01 RX ADMIN — SODIUM CHLORIDE: 0.9 INJECTION, SOLUTION INTRAVENOUS at 01:06

## 2021-01-01 RX ADMIN — Medication 0.32 MCG/KG/MIN: at 12:06

## 2021-01-01 RX ADMIN — HYDROCORTISONE SODIUM SUCCINATE 50 MG: 100 INJECTION, POWDER, FOR SOLUTION INTRAMUSCULAR; INTRAVENOUS at 10:06

## 2021-01-01 RX ADMIN — PIPERACILLIN SODIUM AND TAZOBACTAM SODIUM 4.5 G: 4; .5 INJECTION, POWDER, FOR SOLUTION INTRAVENOUS at 08:06

## 2021-01-01 RX ADMIN — NOREPINEPHRINE BITARTRATE 2.3 MCG/KG/MIN: 1 INJECTION, SOLUTION, CONCENTRATE INTRAVENOUS at 11:06

## 2021-01-01 RX ADMIN — FUROSEMIDE 80 MG: 10 INJECTION INTRAMUSCULAR; INTRAVENOUS at 04:06

## 2021-01-01 RX ADMIN — THIAMINE HYDROCHLORIDE 100 MG: 100 INJECTION, SOLUTION INTRAMUSCULAR; INTRAVENOUS at 01:06

## 2021-01-01 RX ADMIN — Medication 0.44 MCG/KG/MIN: at 07:06

## 2021-01-01 RX ADMIN — ALBUTEROL SULFATE 10 MG: 2.5 SOLUTION RESPIRATORY (INHALATION) at 12:06

## 2021-01-01 RX ADMIN — SODIUM CHLORIDE 1000 ML: 0.9 INJECTION, SOLUTION INTRAVENOUS at 02:06

## 2021-01-01 RX ADMIN — ETOMIDATE 6 MG: 2 INJECTION INTRAVENOUS at 05:06

## 2021-01-01 RX ADMIN — POTASSIUM PHOSPHATE, MONOBASIC AND POTASSIUM PHOSPHATE, DIBASIC 30 MMOL: 224; 236 INJECTION, SOLUTION, CONCENTRATE INTRAVENOUS at 04:06

## 2021-01-01 RX ADMIN — IPRATROPIUM BROMIDE AND ALBUTEROL SULFATE 3 ML: .5; 2.5 SOLUTION RESPIRATORY (INHALATION) at 05:06

## 2021-01-01 RX ADMIN — MAGNESIUM SULFATE 2 G: 2 INJECTION INTRAVENOUS at 11:06

## 2021-01-01 RX ADMIN — SUCCINYLCHOLINE CHLORIDE 160 MG: 20 INJECTION INTRAMUSCULAR; INTRAVENOUS at 05:06

## 2021-01-01 RX ADMIN — MEROPENEM 500 MG: 500 INJECTION INTRAVENOUS at 10:06

## 2021-01-01 RX ADMIN — EPINEPHRINE 1.2 MCG/KG/MIN: 1 INJECTION INTRAMUSCULAR; INTRAVENOUS; SUBCUTANEOUS at 07:06

## 2021-01-01 RX ADMIN — THIAMINE HYDROCHLORIDE 100 MG: 100 INJECTION, SOLUTION INTRAMUSCULAR; INTRAVENOUS at 12:06

## 2021-01-01 RX ADMIN — MIDAZOLAM 1 MG: 1 INJECTION INTRAMUSCULAR; INTRAVENOUS at 09:06

## 2021-01-01 RX ADMIN — ROCURONIUM BROMIDE 2 MG: 10 INJECTION, SOLUTION INTRAVENOUS at 05:06

## 2021-01-01 RX ADMIN — ALBUMIN (HUMAN) 50 G: 12.5 SOLUTION INTRAVENOUS at 02:06

## 2021-01-01 RX ADMIN — POTASSIUM CHLORIDE 40 MEQ: 29.8 INJECTION, SOLUTION INTRAVENOUS at 10:06

## 2021-01-01 RX ADMIN — INSULIN ASPART 2 UNITS: 100 INJECTION, SOLUTION INTRAVENOUS; SUBCUTANEOUS at 02:06

## 2021-01-01 RX ADMIN — Medication 0.16 MCG/KG/MIN: at 06:06

## 2021-01-01 RX ADMIN — DEXMEDETOMIDINE HYDROCHLORIDE 0.1 MCG/KG/HR: 4 INJECTION, SOLUTION INTRAVENOUS at 08:06

## 2021-01-01 RX ADMIN — LORAZEPAM 2 MG: 2 INJECTION INTRAMUSCULAR; INTRAVENOUS at 06:06

## 2021-01-01 RX ADMIN — INSULIN ASPART 1 UNITS: 100 INJECTION, SOLUTION INTRAVENOUS; SUBCUTANEOUS at 12:06

## 2021-01-01 RX ADMIN — DIAZEPAM 5 MG: 5 TABLET ORAL at 01:06

## 2021-01-01 RX ADMIN — Medication 0.42 MCG/KG/MIN: at 10:06

## 2021-01-01 RX ADMIN — CALCIUM GLUCONATE 2 G: 98 INJECTION, SOLUTION INTRAVENOUS at 08:06

## 2021-06-02 PROBLEM — J96.01 ACUTE HYPOXEMIC RESPIRATORY FAILURE: Status: ACTIVE | Noted: 2021-01-01

## 2021-06-02 PROBLEM — F10.939 ALCOHOL WITHDRAWAL: Status: ACTIVE | Noted: 2021-01-01

## 2021-06-02 PROBLEM — K92.1 MELENA: Status: RESOLVED | Noted: 2021-01-01 | Resolved: 2021-01-01

## 2021-06-02 PROBLEM — K72.90 LIVER FAILURE: Status: ACTIVE | Noted: 2021-01-01

## 2021-06-02 PROBLEM — R05.9 COUGH: Status: RESOLVED | Noted: 2021-01-01 | Resolved: 2021-01-01

## 2021-06-02 PROBLEM — K92.2 GIB (GASTROINTESTINAL BLEEDING): Status: ACTIVE | Noted: 2021-01-01

## 2021-06-02 PROBLEM — R05.9 COUGH: Status: ACTIVE | Noted: 2021-01-01

## 2021-06-02 PROBLEM — K92.1 MELENA: Status: ACTIVE | Noted: 2021-01-01

## 2021-06-02 PROBLEM — A41.9 SEPSIS: Status: ACTIVE | Noted: 2021-01-01

## 2021-06-03 PROBLEM — R05.9 COUGH: Status: ACTIVE | Noted: 2021-01-01

## 2021-06-03 PROBLEM — K56.1 INTUSSUSCEPTION OF JEJUNUM: Status: ACTIVE | Noted: 2021-01-01

## 2021-06-07 PROBLEM — G93.40 ACUTE ENCEPHALOPATHY: Status: ACTIVE | Noted: 2021-01-01

## 2021-06-07 PROBLEM — F10.939 ALCOHOL WITHDRAWAL: Status: ACTIVE | Noted: 2018-03-15

## 2021-06-07 PROBLEM — K74.60 DECOMPENSATED HEPATIC CIRRHOSIS: Status: ACTIVE | Noted: 2021-01-01

## 2021-06-07 PROBLEM — N17.9 AKI (ACUTE KIDNEY INJURY): Status: ACTIVE | Noted: 2021-01-01

## 2021-06-09 PROBLEM — Z71.89 GOALS OF CARE, COUNSELING/DISCUSSION: Status: ACTIVE | Noted: 2021-01-01

## 2021-06-17 PROBLEM — D72.823 LEUKEMOID REACTION: Status: ACTIVE | Noted: 2021-01-01

## 2021-06-17 PROBLEM — D53.9 MACROCYTIC ANEMIA: Status: ACTIVE | Noted: 2021-01-01

## 2021-06-17 PROBLEM — D69.6 THROMBOCYTOPENIA: Status: ACTIVE | Noted: 2021-01-01

## 2021-06-24 PROBLEM — Z51.5 COMFORT MEASURES ONLY STATUS: Status: ACTIVE | Noted: 2021-01-01

## 2021-06-24 PROBLEM — Z51.5 PALLIATIVE CARE ENCOUNTER: Status: ACTIVE | Noted: 2021-01-01

## 2021-06-30 LAB
LEGIONELLA CULTURE STATUS: NORMAL
LEGIONELLA SPEC CULT: NORMAL
SPECIMEN SOURCE: NORMAL

## 2021-07-15 LAB — FUNGUS SPEC CULT: NORMAL

## 2021-08-01 LAB
ACID FAST MOD KINY STN SPEC: NORMAL
ACID FAST MOD KINY STN SPEC: NORMAL
MYCOBACTERIUM SPEC QL CULT: NORMAL
MYCOBACTERIUM SPEC QL CULT: NORMAL